# Patient Record
Sex: MALE | Race: WHITE | NOT HISPANIC OR LATINO | Employment: OTHER | URBAN - METROPOLITAN AREA
[De-identification: names, ages, dates, MRNs, and addresses within clinical notes are randomized per-mention and may not be internally consistent; named-entity substitution may affect disease eponyms.]

---

## 2019-01-09 ENCOUNTER — HOSPITAL ENCOUNTER (INPATIENT)
Facility: HOSPITAL | Age: 74
LOS: 2 days | Discharge: HOME | End: 2019-01-11
Attending: EMERGENCY MEDICINE | Admitting: HOSPITALIST

## 2019-01-09 ENCOUNTER — APPOINTMENT (OUTPATIENT)
Dept: CT IMAGING | Facility: HOSPITAL | Age: 74
End: 2019-01-09

## 2019-01-09 ENCOUNTER — APPOINTMENT (OUTPATIENT)
Dept: GENERAL RADIOLOGY | Facility: HOSPITAL | Age: 74
End: 2019-01-09

## 2019-01-09 DIAGNOSIS — K85.90 ACUTE PANCREATITIS WITHOUT INFECTION OR NECROSIS, UNSPECIFIED PANCREATITIS TYPE: Primary | ICD-10-CM

## 2019-01-09 DIAGNOSIS — N20.0 BILATERAL KIDNEY STONES: ICD-10-CM

## 2019-01-09 PROBLEM — I10 HYPERTENSION: Chronic | Status: ACTIVE | Noted: 2019-01-09

## 2019-01-09 PROBLEM — G47.30 SLEEP APNEA: Chronic | Status: ACTIVE | Noted: 2019-01-09

## 2019-01-09 PROBLEM — E87.1 HYPONATREMIA: Status: ACTIVE | Noted: 2019-01-09

## 2019-01-09 PROBLEM — E11.9 DIABETES MELLITUS TYPE 2, NONINSULIN DEPENDENT (HCC): Chronic | Status: ACTIVE | Noted: 2019-01-09

## 2019-01-09 LAB
ALBUMIN SERPL-MCNC: 4 G/DL (ref 3.5–5.2)
ALBUMIN/GLOB SERPL: 1.3 G/DL
ALP SERPL-CCNC: 83 U/L (ref 39–117)
ALT SERPL W P-5'-P-CCNC: 20 U/L (ref 1–41)
ANION GAP SERPL CALCULATED.3IONS-SCNC: 12.7 MMOL/L
AST SERPL-CCNC: 15 U/L (ref 1–40)
BASOPHILS # BLD AUTO: 0.01 10*3/MM3 (ref 0–0.2)
BASOPHILS NFR BLD AUTO: 0.1 % (ref 0–1.5)
BILIRUB SERPL-MCNC: 0.3 MG/DL (ref 0.1–1.2)
BUN BLD-MCNC: 17 MG/DL (ref 8–23)
BUN/CREAT SERPL: 17.3 (ref 7–25)
CALCIUM SPEC-SCNC: 9.4 MG/DL (ref 8.6–10.5)
CHLORIDE SERPL-SCNC: 95 MMOL/L (ref 98–107)
CO2 SERPL-SCNC: 25.3 MMOL/L (ref 22–29)
CREAT BLD-MCNC: 0.98 MG/DL (ref 0.76–1.27)
DEPRECATED RDW RBC AUTO: 44.9 FL (ref 37–54)
EOSINOPHIL # BLD AUTO: 0.11 10*3/MM3 (ref 0–0.7)
EOSINOPHIL NFR BLD AUTO: 1.1 % (ref 0.3–6.2)
ERYTHROCYTE [DISTWIDTH] IN BLOOD BY AUTOMATED COUNT: 13.3 % (ref 11.5–14.5)
ETHANOL BLD-MCNC: 10 MG/DL (ref 0–10)
ETHANOL UR QL: 0.01 %
GFR SERPL CREATININE-BSD FRML MDRD: 75 ML/MIN/1.73
GLOBULIN UR ELPH-MCNC: 3.2 GM/DL
GLUCOSE BLD-MCNC: 112 MG/DL (ref 65–99)
GLUCOSE BLDC GLUCOMTR-MCNC: 105 MG/DL (ref 70–130)
HCT VFR BLD AUTO: 43.4 % (ref 40.4–52.2)
HGB BLD-MCNC: 13.8 G/DL (ref 13.7–17.6)
IMM GRANULOCYTES # BLD AUTO: 0.03 10*3/MM3 (ref 0–0.03)
IMM GRANULOCYTES NFR BLD AUTO: 0.3 % (ref 0–0.5)
LIPASE SERPL-CCNC: 400 U/L (ref 13–60)
LYMPHOCYTES # BLD AUTO: 1.33 10*3/MM3 (ref 0.9–4.8)
LYMPHOCYTES NFR BLD AUTO: 12.8 % (ref 19.6–45.3)
MCH RBC QN AUTO: 29.7 PG (ref 27–32.7)
MCHC RBC AUTO-ENTMCNC: 31.8 G/DL (ref 32.6–36.4)
MCV RBC AUTO: 93.5 FL (ref 79.8–96.2)
MONOCYTES # BLD AUTO: 1.01 10*3/MM3 (ref 0.2–1.2)
MONOCYTES NFR BLD AUTO: 9.7 % (ref 5–12)
NEUTROPHILS # BLD AUTO: 7.9 10*3/MM3 (ref 1.9–8.1)
NEUTROPHILS NFR BLD AUTO: 76 % (ref 42.7–76)
PLATELET # BLD AUTO: 242 10*3/MM3 (ref 140–500)
PMV BLD AUTO: 10.4 FL (ref 6–12)
POTASSIUM BLD-SCNC: 4.3 MMOL/L (ref 3.5–5.2)
PROT SERPL-MCNC: 7.2 G/DL (ref 6–8.5)
RBC # BLD AUTO: 4.64 10*6/MM3 (ref 4.6–6)
SODIUM BLD-SCNC: 133 MMOL/L (ref 136–145)
TROPONIN T SERPL-MCNC: <0.01 NG/ML (ref 0–0.03)
WBC NRBC COR # BLD: 10.39 10*3/MM3 (ref 4.5–10.7)

## 2019-01-09 PROCEDURE — 80307 DRUG TEST PRSMV CHEM ANLYZR: CPT | Performed by: EMERGENCY MEDICINE

## 2019-01-09 PROCEDURE — 36415 COLL VENOUS BLD VENIPUNCTURE: CPT

## 2019-01-09 PROCEDURE — 85025 COMPLETE CBC W/AUTO DIFF WBC: CPT | Performed by: PHYSICIAN ASSISTANT

## 2019-01-09 PROCEDURE — 80053 COMPREHEN METABOLIC PANEL: CPT | Performed by: PHYSICIAN ASSISTANT

## 2019-01-09 PROCEDURE — 74177 CT ABD & PELVIS W/CONTRAST: CPT

## 2019-01-09 PROCEDURE — 82962 GLUCOSE BLOOD TEST: CPT

## 2019-01-09 PROCEDURE — 84484 ASSAY OF TROPONIN QUANT: CPT | Performed by: EMERGENCY MEDICINE

## 2019-01-09 PROCEDURE — 25010000002 ENOXAPARIN PER 10 MG: Performed by: INTERNAL MEDICINE

## 2019-01-09 PROCEDURE — 71046 X-RAY EXAM CHEST 2 VIEWS: CPT

## 2019-01-09 PROCEDURE — 99284 EMERGENCY DEPT VISIT MOD MDM: CPT

## 2019-01-09 PROCEDURE — 36415 COLL VENOUS BLD VENIPUNCTURE: CPT | Performed by: PHYSICIAN ASSISTANT

## 2019-01-09 PROCEDURE — 25010000002 HYDROMORPHONE PER 4 MG: Performed by: EMERGENCY MEDICINE

## 2019-01-09 PROCEDURE — 25010000002 HYDROMORPHONE PER 4 MG: Performed by: INTERNAL MEDICINE

## 2019-01-09 PROCEDURE — 93005 ELECTROCARDIOGRAM TRACING: CPT

## 2019-01-09 PROCEDURE — 83690 ASSAY OF LIPASE: CPT | Performed by: EMERGENCY MEDICINE

## 2019-01-09 PROCEDURE — 25810000003 SODIUM CHLORIDE 0.9 % WITH KCL 20 MEQ 20-0.9 MEQ/L-% SOLUTION: Performed by: INTERNAL MEDICINE

## 2019-01-09 PROCEDURE — 93010 ELECTROCARDIOGRAM REPORT: CPT | Performed by: INTERNAL MEDICINE

## 2019-01-09 PROCEDURE — 25010000002 ONDANSETRON PER 1 MG

## 2019-01-09 PROCEDURE — 25010000002 IOPAMIDOL 61 % SOLUTION: Performed by: EMERGENCY MEDICINE

## 2019-01-09 PROCEDURE — 93005 ELECTROCARDIOGRAM TRACING: CPT | Performed by: EMERGENCY MEDICINE

## 2019-01-09 RX ORDER — ACETAMINOPHEN, ASPIRIN AND CAFFEINE 250; 250; 65 MG/1; MG/1; MG/1
1 TABLET, FILM COATED ORAL EVERY 6 HOURS PRN
COMMUNITY
End: 2019-01-11 | Stop reason: HOSPADM

## 2019-01-09 RX ORDER — BISACODYL 10 MG
10 SUPPOSITORY, RECTAL RECTAL DAILY PRN
Status: DISCONTINUED | OUTPATIENT
Start: 2019-01-09 | End: 2019-01-11 | Stop reason: HOSPADM

## 2019-01-09 RX ORDER — DEXTROSE MONOHYDRATE 25 G/50ML
25 INJECTION, SOLUTION INTRAVENOUS
Status: DISCONTINUED | OUTPATIENT
Start: 2019-01-09 | End: 2019-01-11 | Stop reason: HOSPADM

## 2019-01-09 RX ORDER — HYDROMORPHONE HYDROCHLORIDE 1 MG/ML
0.5 INJECTION, SOLUTION INTRAMUSCULAR; INTRAVENOUS; SUBCUTANEOUS
Status: DISCONTINUED | OUTPATIENT
Start: 2019-01-09 | End: 2019-01-11 | Stop reason: HOSPADM

## 2019-01-09 RX ORDER — ECHINACEA PURPUREA EXTRACT 125 MG
2 TABLET ORAL AS NEEDED
Status: DISCONTINUED | OUTPATIENT
Start: 2019-01-09 | End: 2019-01-11 | Stop reason: HOSPADM

## 2019-01-09 RX ORDER — NALOXONE HCL 0.4 MG/ML
0.4 VIAL (ML) INJECTION
Status: DISCONTINUED | OUTPATIENT
Start: 2019-01-09 | End: 2019-01-11 | Stop reason: HOSPADM

## 2019-01-09 RX ORDER — ONDANSETRON 2 MG/ML
4 INJECTION INTRAMUSCULAR; INTRAVENOUS EVERY 6 HOURS PRN
Status: DISCONTINUED | OUTPATIENT
Start: 2019-01-09 | End: 2019-01-09

## 2019-01-09 RX ORDER — SODIUM CHLORIDE 0.9 % (FLUSH) 0.9 %
1-10 SYRINGE (ML) INJECTION AS NEEDED
Status: DISCONTINUED | OUTPATIENT
Start: 2019-01-09 | End: 2019-01-11 | Stop reason: HOSPADM

## 2019-01-09 RX ORDER — ACETAMINOPHEN 325 MG/1
650 TABLET ORAL EVERY 6 HOURS PRN
Status: DISCONTINUED | OUTPATIENT
Start: 2019-01-09 | End: 2019-01-11 | Stop reason: HOSPADM

## 2019-01-09 RX ORDER — CALCIUM CARBONATE 200(500)MG
1 TABLET,CHEWABLE ORAL 2 TIMES DAILY PRN
Status: DISCONTINUED | OUTPATIENT
Start: 2019-01-09 | End: 2019-01-11 | Stop reason: HOSPADM

## 2019-01-09 RX ORDER — ONDANSETRON 2 MG/ML
INJECTION INTRAMUSCULAR; INTRAVENOUS
Status: COMPLETED
Start: 2019-01-09 | End: 2019-01-09

## 2019-01-09 RX ORDER — FAMOTIDINE 10 MG/ML
20 INJECTION, SOLUTION INTRAVENOUS ONCE
Status: COMPLETED | OUTPATIENT
Start: 2019-01-09 | End: 2019-01-09

## 2019-01-09 RX ORDER — ONDANSETRON 2 MG/ML
4 INJECTION INTRAMUSCULAR; INTRAVENOUS ONCE
Status: COMPLETED | OUTPATIENT
Start: 2019-01-09 | End: 2019-01-09

## 2019-01-09 RX ORDER — NITROGLYCERIN 0.4 MG/1
0.4 TABLET SUBLINGUAL
Status: DISCONTINUED | OUTPATIENT
Start: 2019-01-09 | End: 2019-01-11 | Stop reason: HOSPADM

## 2019-01-09 RX ORDER — HYDROMORPHONE HYDROCHLORIDE 1 MG/ML
0.5 INJECTION, SOLUTION INTRAMUSCULAR; INTRAVENOUS; SUBCUTANEOUS ONCE
Status: COMPLETED | OUTPATIENT
Start: 2019-01-09 | End: 2019-01-09

## 2019-01-09 RX ORDER — ONDANSETRON 4 MG/1
4 TABLET, ORALLY DISINTEGRATING ORAL EVERY 6 HOURS PRN
Status: DISCONTINUED | OUTPATIENT
Start: 2019-01-09 | End: 2019-01-11 | Stop reason: HOSPADM

## 2019-01-09 RX ORDER — ONDANSETRON 2 MG/ML
4 INJECTION INTRAMUSCULAR; INTRAVENOUS EVERY 6 HOURS PRN
Status: DISCONTINUED | OUTPATIENT
Start: 2019-01-09 | End: 2019-01-11 | Stop reason: HOSPADM

## 2019-01-09 RX ORDER — CLONIDINE HYDROCHLORIDE 0.1 MG/1
0.1 TABLET ORAL EVERY 8 HOURS PRN
Status: DISCONTINUED | OUTPATIENT
Start: 2019-01-09 | End: 2019-01-11 | Stop reason: HOSPADM

## 2019-01-09 RX ORDER — SODIUM CHLORIDE AND POTASSIUM CHLORIDE 150; 900 MG/100ML; MG/100ML
125 INJECTION, SOLUTION INTRAVENOUS CONTINUOUS
Status: DISCONTINUED | OUTPATIENT
Start: 2019-01-09 | End: 2019-01-10

## 2019-01-09 RX ORDER — DOCUSATE SODIUM 100 MG/1
100 CAPSULE, LIQUID FILLED ORAL 2 TIMES DAILY PRN
Status: DISCONTINUED | OUTPATIENT
Start: 2019-01-09 | End: 2019-01-11 | Stop reason: HOSPADM

## 2019-01-09 RX ORDER — ONDANSETRON 4 MG/1
4 TABLET, FILM COATED ORAL EVERY 6 HOURS PRN
Status: DISCONTINUED | OUTPATIENT
Start: 2019-01-09 | End: 2019-01-11 | Stop reason: HOSPADM

## 2019-01-09 RX ORDER — IPRATROPIUM BROMIDE 21 UG/1
2 SPRAY, METERED NASAL EVERY 12 HOURS
Status: DISCONTINUED | OUTPATIENT
Start: 2019-01-09 | End: 2019-01-11 | Stop reason: HOSPADM

## 2019-01-09 RX ORDER — SODIUM CHLORIDE 0.9 % (FLUSH) 0.9 %
3 SYRINGE (ML) INJECTION EVERY 12 HOURS SCHEDULED
Status: DISCONTINUED | OUTPATIENT
Start: 2019-01-09 | End: 2019-01-11 | Stop reason: HOSPADM

## 2019-01-09 RX ORDER — NICOTINE POLACRILEX 4 MG
15 LOZENGE BUCCAL
Status: DISCONTINUED | OUTPATIENT
Start: 2019-01-09 | End: 2019-01-11 | Stop reason: HOSPADM

## 2019-01-09 RX ADMIN — IOPAMIDOL 85 ML: 612 INJECTION, SOLUTION INTRAVENOUS at 15:30

## 2019-01-09 RX ADMIN — ENOXAPARIN SODIUM 40 MG: 40 INJECTION SUBCUTANEOUS at 20:58

## 2019-01-09 RX ADMIN — HYDROMORPHONE HYDROCHLORIDE 0.5 MG: 1 INJECTION, SOLUTION INTRAMUSCULAR; INTRAVENOUS; SUBCUTANEOUS at 18:03

## 2019-01-09 RX ADMIN — SODIUM CHLORIDE AND POTASSIUM CHLORIDE 125 ML/HR: .9; .15 SOLUTION INTRAVENOUS at 20:59

## 2019-01-09 RX ADMIN — FAMOTIDINE 20 MG: 10 INJECTION, SOLUTION INTRAVENOUS at 16:04

## 2019-01-09 RX ADMIN — ONDANSETRON 4 MG: 2 INJECTION INTRAMUSCULAR; INTRAVENOUS at 14:59

## 2019-01-09 RX ADMIN — IPRATROPIUM BROMIDE 2 SPRAY: 21 SPRAY NASAL at 20:58

## 2019-01-09 RX ADMIN — METOPROLOL TARTRATE 25 MG: 25 TABLET ORAL at 20:58

## 2019-01-09 RX ADMIN — SODIUM CHLORIDE 1000 ML: 9 INJECTION, SOLUTION INTRAVENOUS at 16:03

## 2019-01-09 RX ADMIN — Medication 3 ML: at 21:00

## 2019-01-09 RX ADMIN — HYDROMORPHONE HYDROCHLORIDE 0.5 MG: 1 INJECTION, SOLUTION INTRAMUSCULAR; INTRAVENOUS; SUBCUTANEOUS at 20:58

## 2019-01-09 RX ADMIN — HYDROMORPHONE HYDROCHLORIDE 0.5 MG: 1 INJECTION, SOLUTION INTRAMUSCULAR; INTRAVENOUS; SUBCUTANEOUS at 16:02

## 2019-01-09 NOTE — ED PROVIDER NOTES
EMERGENCY DEPARTMENT ENCOUNTER    CHIEF COMPLAINT  Chief Complaint: abdominal pain  History given by: patient  History limited by: none  Room Number: 53/53  PMD: Feli Slade MD      HPI:  Pt is a 73 y.o. male who presents complaining of burning epigastric abdominal pain that started yesterday. Pt admits to nausea and abd distention but denies SOA, dizziness or excessive EtOH use. Pt states that he had a negative stress performed a year ago at [facility]. Pt denies having similar symptoms previously.    Duration: 2 days  Onset: gradual  Timing: constant  Location: epigastric abdomen  Radiation: none  Quality: burning  Intensity/Severity: moderate  Progression: unchanged  Associated Symptoms: nausea, abd distention  Aggravating Factors: none  Alleviating Factors: none  Previous Episodes: Pt denies having similar symptoms previously.  Treatment before arrival: none     PAST MEDICAL HISTORY  Active Ambulatory Problems     Diagnosis Date Noted   • Osteoarthritis of left hip 06/13/2016   • Osteoarthritis of right hip 07/20/2016     Resolved Ambulatory Problems     Diagnosis Date Noted   • No Resolved Ambulatory Problems     Past Medical History:   Diagnosis Date   • Acid reflux    • Arthritis    • Bilateral kidney stones    • Diabetes mellitus (CMS/[facility])    • Diabetes mellitus type 2, noninsulin dependent (CMS/[facility])    • High cholesterol    • Hypertension    • Sleep apnea        PAST SURGICAL HISTORY  Past Surgical History:   Procedure Laterality Date   • EXTRACORPOREAL SHOCK WAVE LITHOTRIPSY (ESWL)  2014   • FINGER/THUMB CLOSED REDUCTION AND PERCUTANEOUS PINNING Left 1982   • SINUS SURGERY     • TOTAL HIP ARTHROPLASTY Left 6/13/2016    Procedure: LT TOTAL HIP ARTHROPLASTY ANTERIOR;  Surgeon: Sandor Phillips MD;  Location: [facility];  Service:    • TOTAL HIP ARTHROPLASTY Right 7/20/2016    Procedure: RT TOTAL HIP ARTHROPLASTY ANTERIOR WITH HANA TABLE;  Surgeon: Sandor Phillips MD;  Location: [facility] OR;   Service:        FAMILY HISTORY  History reviewed. No pertinent family history.    SOCIAL HISTORY  Social History     Socioeconomic History   • Marital status:      Spouse name: Not on file   • Number of children: Not on file   • Years of education: Not on file   • Highest education level: Not on file   Social Needs   • Financial resource strain: Not on file   • Food insecurity - worry: Not on file   • Food insecurity - inability: Not on file   • Transportation needs - medical: Not on file   • Transportation needs - non-medical: Not on file   Occupational History   • Not on file   Tobacco Use   • Smoking status: Never Smoker   Substance and Sexual Activity   • Alcohol use: Yes     Comment: RARELY   • Drug use: No   • Sexual activity: Defer   Other Topics Concern   • Not on file   Social History Narrative   • Not on file       ALLERGIES  Naproxen    REVIEW OF SYSTEMS  Review of Systems   Constitutional: Negative for activity change, appetite change and fever.   HENT: Negative for congestion and sore throat.    Eyes: Negative.    Respiratory: Negative for cough and shortness of breath.    Cardiovascular: Negative for chest pain and leg swelling.   Gastrointestinal: Positive for abdominal distention, abdominal pain ( epigastric) and nausea. Negative for diarrhea and vomiting.   Endocrine: Negative.    Genitourinary: Negative for decreased urine volume and dysuria.   Musculoskeletal: Negative for neck pain.   Skin: Negative for rash and wound.   Allergic/Immunologic: Negative.    Neurological: Negative for dizziness, weakness, numbness and headaches.   Hematological: Negative.    Psychiatric/Behavioral: Negative.    All other systems reviewed and are negative.      PHYSICAL EXAM  ED Triage Vitals   Temp Heart Rate Resp BP SpO2   01/09/19 1227 01/09/19 1227 01/09/19 1227 01/09/19 1238 01/09/19 1227   97 °F (36.1 °C) 103 16 160/87 97 %      Temp src Heart Rate Source Patient Position BP Location FiO2 (%)   01/09/19  1227 -- -- -- --   Tympanic           Physical Exam   Constitutional: He is oriented to person, place, and time. No distress.   HENT:   Head: Normocephalic and atraumatic.   Eyes: EOM are normal. Pupils are equal, round, and reactive to light.   Neck: Normal range of motion. Neck supple.   Cardiovascular: Regular rhythm and normal heart sounds. Tachycardia present.   Pulmonary/Chest: Effort normal and breath sounds normal. No respiratory distress.   Abdominal: Soft. There is tenderness in the epigastric area. There is no rebound, no guarding and no tenderness at McBurney's point.   Musculoskeletal: Normal range of motion. He exhibits no edema.   Neurological: He is alert and oriented to person, place, and time. He has normal sensation and normal strength.   Skin: Skin is warm and dry.   Psychiatric: Mood and affect normal.   Nursing note and vitals reviewed.      LAB RESULTS  Lab Results (last 24 hours)     Procedure Component Value Units Date/Time    CBC & Differential [[id number]] Collected:  01/09/19 1238    Specimen:  Blood Updated:  01/09/19 1252    Narrative:       The following orders were created for panel order CBC & Differential.  Procedure                               Abnormality         Status                     ---------                               -----------         ------                     CBC Auto Differential[[id number]]         Abnormal            Final result                 Please view results for these tests on the individual orders.    Comprehensive Metabolic Panel [[id number]]  (Abnormal) Collected:  01/09/19 1238    Specimen:  Blood Updated:  01/09/19 1309     Glucose 112 mg/dL      BUN 17 mg/dL      Creatinine 0.98 mg/dL      Sodium 133 mmol/L      Potassium 4.3 mmol/L      Chloride 95 mmol/L      CO2 25.3 mmol/L      Calcium 9.4 mg/dL      Total Protein 7.2 g/dL      Albumin 4.00 g/dL      ALT (SGPT) 20 U/L      AST (SGOT) 15 U/L      Alkaline Phosphatase 83 U/L      Total Bilirubin 0.3  mg/dL      eGFR Non African Amer 75 mL/min/1.73      Globulin 3.2 gm/dL      A/G Ratio 1.3 g/dL      BUN/Creatinine Ratio 17.3     Anion Gap 12.7 mmol/L     Narrative:       The MDRD GFR formula is only valid for adults with stable renal function between ages 18 and 70.    CBC Auto Differential [[id number]]  (Abnormal) Collected:  01/09/19 1238    Specimen:  Blood Updated:  01/09/19 1252     WBC 10.39 10*3/mm3      RBC 4.64 10*6/mm3      Hemoglobin 13.8 g/dL      Hematocrit 43.4 %      MCV 93.5 fL      MCH 29.7 pg      MCHC 31.8 g/dL      RDW 13.3 %      RDW-SD 44.9 fl      MPV 10.4 fL      Platelets 242 10*3/mm3      Neutrophil % 76.0 %      Lymphocyte % 12.8 %      Monocyte % 9.7 %      Eosinophil % 1.1 %      Basophil % 0.1 %      Immature Grans % 0.3 %      Neutrophils, Absolute 7.90 10*3/mm3      Lymphocytes, Absolute 1.33 10*3/mm3      Monocytes, Absolute 1.01 10*3/mm3      Eosinophils, Absolute 0.11 10*3/mm3      Basophils, Absolute 0.01 10*3/mm3      Immature Grans, Absolute 0.03 10*3/mm3     Lipase [[id number]]  (Abnormal) Collected:  01/09/19 1238    Specimen:  Blood Updated:  01/09/19 1538     Lipase 400 U/L     Troponin [[id number]]  (Normal) Collected:  01/09/19 1238    Specimen:  Blood Updated:  01/09/19 1531     Troponin T <0.010 ng/mL     Narrative:       Troponin T Reference Ranges:  Less than 0.03 ng/mL:    Negative for AMI  0.03 to 0.09 ng/mL:      Indeterminant for AMI  Greater than 0.09 ng/mL: Positive for AMI    Ethanol [[id number]] Collected:  01/09/19 1621    Specimen:  Blood Updated:  01/09/19 1656     Ethanol 10 mg/dL      Ethanol % 0.010 %           I ordered the above labs and reviewed the results    RADIOLOGY  CT Abdomen Pelvis With Contrast   Preliminary Result   1. There is acute pancreatitis localized to the pancreatic tail. The   more dense and bulbous appearance of the pancreatic tail may be at least   in part related to the acute pancreatitis, but the appearance is   concerning for an  underlying mass resulting in pancreatitis. There is   likely partial occlusion of the adjacent aspect of the splenic vein.   2. There is a 0.7 cm stone within the left UPJ with mild distention of a   lower pole infundibulum. There is also a 7 mm nonobstructing stone   within the right kidney.          XR Chest 2 View   Final Result   No evidence for active disease in the chest.       This report was finalized on 1/9/2019 1:24 PM by Dr. Nick Sheikh M.D.             I ordered the above noted radiological studies. Interpreted by radiologist. Discussed with radiologist Dr. Garcia. Reviewed by me in PACS.        PROCEDURES  Procedures    EKG          EKG time: 1230  Rhythm/Rate: sinus tachycardia, 100  P waves and OK: nl  QRS, axis: nl   ST and T waves: nl      Interpreted Contemporaneously by me, independently viewed  unchanged compared to prior 5/31/16      PROGRESS AND CONSULTS      1502- Notified pt of his EKG results and the plan to order lab and imaging studies for further evaluation. Pt understands and agrees with the plan, all questions answered.    1505  Ordered troponin, lipase, ethanol and CT abd/pelvis for further evaluation. Pepcid ordered for abd pain.    1543 Dilaudid ordered for pain management.  Ordered IVF for hydration.    1557 Rechecked pt. Pt is resting comfortably. Notified pt of his lab and imaging results, including the pancreatitis seen on CT. Discussed the plan to admit the pt for pain control and further evaluation. Pt and family agree with the plan and all questions were addressed.    1559- Placed call to [facility] for admission.    1625 Received a call from Dr. Torres ([facility]) and discussed patient's case. Dr. Torres agrees with the plan to admit the pt to a telemetry bed.         MEDICAL DECISION MAKING  Results were reviewed/discussed with the patient and they were also made aware of online access. Pt also made aware that some labs, such as cultures, will not be resulted during ER visit and  follow up with PMD is necessary.     MDM  Number of Diagnoses or Management Options  Acute pancreatitis without infection or necrosis, unspecified pancreatitis type:   Bilateral kidney stones:      Amount and/or Complexity of Data Reviewed  Clinical lab tests: ordered and reviewed (Lipase - 400)  Tests in the radiology section of CPT®: ordered and reviewed (CT abd/pelvis shows pancreatitis at the tail with a bulbous tail (can't r/o mass) without free fluid. 7mm stone at the proximal left UPJ stone wtihout obstruction. 7mm stone at the lower pole of the right kidney)  Tests in the medicine section of CPT®: ordered and reviewed (EKG - see procedure note)  Discussion of test results with the performing providers: yes (Dr. Garcia (radiologist))  Decide to obtain previous medical records or to obtain history from someone other than the patient: yes  Review and summarize past medical records: yes (Reviewed pt's stress test on 2/12/18 showed no Ischemia. EF 54%)  Discuss the patient with other providers: yes (Dr. Torres ([facility]))  Independent visualization of images, tracings, or specimens: yes           DIAGNOSIS  Final diagnoses:   Acute pancreatitis without infection or necrosis, unspecified pancreatitis type   Bilateral kidney stones       DISPOSITION  ADMISSION     Discussed treatment plan and reason for admission with pt/family and admitting physician.  Pt/family voiced understanding of the plan for admission for further testing/treatment as needed.         Latest Documented Vital Signs:  As of 5:52 PM  BP- 174/94 HR- 98 Temp- 97 °F (36.1 °C) (Tympanic) O2 sat- 99%    --  Documentation assistance provided by true Jeffries and Any Guzman for Dr. Nicole.  Information recorded by the true was done at my direction and has been verified and validated by me.      Abdiel Jeffries  01/09/19 1631       Any Guzman  01/09/19 1752       Juanito Nicole MD  01/09/19 4453

## 2019-01-10 LAB
ANION GAP SERPL CALCULATED.3IONS-SCNC: 8.5 MMOL/L
BASOPHILS # BLD AUTO: 0.01 10*3/MM3 (ref 0–0.2)
BASOPHILS NFR BLD AUTO: 0.1 % (ref 0–1.5)
BUN BLD-MCNC: 12 MG/DL (ref 8–23)
BUN/CREAT SERPL: 14.5 (ref 7–25)
CALCIUM SPEC-SCNC: 9.1 MG/DL (ref 8.6–10.5)
CHLORIDE SERPL-SCNC: 96 MMOL/L (ref 98–107)
CO2 SERPL-SCNC: 30.5 MMOL/L (ref 22–29)
CREAT BLD-MCNC: 0.83 MG/DL (ref 0.76–1.27)
DEPRECATED RDW RBC AUTO: 45.7 FL (ref 37–54)
EOSINOPHIL # BLD AUTO: 0.06 10*3/MM3 (ref 0–0.7)
EOSINOPHIL NFR BLD AUTO: 0.7 % (ref 0.3–6.2)
ERYTHROCYTE [DISTWIDTH] IN BLOOD BY AUTOMATED COUNT: 13.3 % (ref 11.5–14.5)
GFR SERPL CREATININE-BSD FRML MDRD: 91 ML/MIN/1.73
GLUCOSE BLD-MCNC: 125 MG/DL (ref 65–99)
GLUCOSE BLDC GLUCOMTR-MCNC: 115 MG/DL (ref 70–130)
GLUCOSE BLDC GLUCOMTR-MCNC: 120 MG/DL (ref 70–130)
GLUCOSE BLDC GLUCOMTR-MCNC: 91 MG/DL (ref 70–130)
GLUCOSE BLDC GLUCOMTR-MCNC: 95 MG/DL (ref 70–130)
HCT VFR BLD AUTO: 40.8 % (ref 40.4–52.2)
HGB BLD-MCNC: 12.8 G/DL (ref 13.7–17.6)
IMM GRANULOCYTES # BLD AUTO: 0.04 10*3/MM3 (ref 0–0.03)
IMM GRANULOCYTES NFR BLD AUTO: 0.5 % (ref 0–0.5)
LYMPHOCYTES # BLD AUTO: 0.94 10*3/MM3 (ref 0.9–4.8)
LYMPHOCYTES NFR BLD AUTO: 10.7 % (ref 19.6–45.3)
MAGNESIUM SERPL-MCNC: 1.8 MG/DL (ref 1.6–2.4)
MCH RBC QN AUTO: 29.7 PG (ref 27–32.7)
MCHC RBC AUTO-ENTMCNC: 31.4 G/DL (ref 32.6–36.4)
MCV RBC AUTO: 94.7 FL (ref 79.8–96.2)
MONOCYTES # BLD AUTO: 0.86 10*3/MM3 (ref 0.2–1.2)
MONOCYTES NFR BLD AUTO: 9.8 % (ref 5–12)
NEUTROPHILS # BLD AUTO: 6.87 10*3/MM3 (ref 1.9–8.1)
NEUTROPHILS NFR BLD AUTO: 78.2 % (ref 42.7–76)
PHOSPHATE SERPL-MCNC: 2.5 MG/DL (ref 2.5–4.5)
PLATELET # BLD AUTO: 223 10*3/MM3 (ref 140–500)
PMV BLD AUTO: 10.4 FL (ref 6–12)
POTASSIUM BLD-SCNC: 4.2 MMOL/L (ref 3.5–5.2)
RBC # BLD AUTO: 4.31 10*6/MM3 (ref 4.6–6)
SODIUM BLD-SCNC: 135 MMOL/L (ref 136–145)
TROPONIN T SERPL-MCNC: <0.01 NG/ML (ref 0–0.03)
TSH SERPL DL<=0.05 MIU/L-ACNC: 2.22 MIU/ML (ref 0.27–4.2)
WBC NRBC COR # BLD: 8.78 10*3/MM3 (ref 4.5–10.7)

## 2019-01-10 PROCEDURE — 25010000002 ENOXAPARIN PER 10 MG: Performed by: INTERNAL MEDICINE

## 2019-01-10 PROCEDURE — 99222 1ST HOSP IP/OBS MODERATE 55: CPT | Performed by: SURGERY

## 2019-01-10 PROCEDURE — 25010000002 HYDROMORPHONE PER 4 MG: Performed by: INTERNAL MEDICINE

## 2019-01-10 PROCEDURE — 82962 GLUCOSE BLOOD TEST: CPT

## 2019-01-10 PROCEDURE — 83735 ASSAY OF MAGNESIUM: CPT | Performed by: INTERNAL MEDICINE

## 2019-01-10 PROCEDURE — 80048 BASIC METABOLIC PNL TOTAL CA: CPT | Performed by: INTERNAL MEDICINE

## 2019-01-10 PROCEDURE — 84100 ASSAY OF PHOSPHORUS: CPT | Performed by: INTERNAL MEDICINE

## 2019-01-10 PROCEDURE — 85025 COMPLETE CBC W/AUTO DIFF WBC: CPT | Performed by: INTERNAL MEDICINE

## 2019-01-10 PROCEDURE — 84484 ASSAY OF TROPONIN QUANT: CPT | Performed by: INTERNAL MEDICINE

## 2019-01-10 PROCEDURE — 25810000003 SODIUM CHLORIDE 0.9 % WITH KCL 20 MEQ 20-0.9 MEQ/L-% SOLUTION: Performed by: INTERNAL MEDICINE

## 2019-01-10 PROCEDURE — 84443 ASSAY THYROID STIM HORMONE: CPT | Performed by: INTERNAL MEDICINE

## 2019-01-10 RX ORDER — SODIUM CHLORIDE 9 MG/ML
100 INJECTION, SOLUTION INTRAVENOUS CONTINUOUS
Status: DISCONTINUED | OUTPATIENT
Start: 2019-01-10 | End: 2019-01-11 | Stop reason: HOSPADM

## 2019-01-10 RX ADMIN — DOCUSATE SODIUM 100 MG: 100 CAPSULE, LIQUID FILLED ORAL at 08:38

## 2019-01-10 RX ADMIN — ENOXAPARIN SODIUM 80 MG: 80 INJECTION SUBCUTANEOUS at 21:43

## 2019-01-10 RX ADMIN — IPRATROPIUM BROMIDE 2 SPRAY: 21 SPRAY NASAL at 08:39

## 2019-01-10 RX ADMIN — ENOXAPARIN SODIUM 80 MG: 80 INJECTION SUBCUTANEOUS at 08:38

## 2019-01-10 RX ADMIN — IPRATROPIUM BROMIDE 2 SPRAY: 21 SPRAY NASAL at 18:35

## 2019-01-10 RX ADMIN — SODIUM CHLORIDE AND POTASSIUM CHLORIDE 125 ML/HR: .9; .15 SOLUTION INTRAVENOUS at 12:48

## 2019-01-10 RX ADMIN — Medication 3 ML: at 22:29

## 2019-01-10 RX ADMIN — ACETAMINOPHEN 650 MG: 325 TABLET, FILM COATED ORAL at 22:28

## 2019-01-10 RX ADMIN — METOPROLOL TARTRATE 25 MG: 25 TABLET ORAL at 21:43

## 2019-01-10 RX ADMIN — SODIUM CHLORIDE 100 ML/HR: 9 INJECTION, SOLUTION INTRAVENOUS at 16:27

## 2019-01-10 RX ADMIN — HYDROMORPHONE HYDROCHLORIDE 0.5 MG: 1 INJECTION, SOLUTION INTRAMUSCULAR; INTRAVENOUS; SUBCUTANEOUS at 01:00

## 2019-01-10 RX ADMIN — METOPROLOL TARTRATE 25 MG: 25 TABLET ORAL at 08:38

## 2019-01-10 RX ADMIN — ACETAMINOPHEN 650 MG: 325 TABLET, FILM COATED ORAL at 16:18

## 2019-01-10 RX ADMIN — SODIUM CHLORIDE AND POTASSIUM CHLORIDE 125 ML/HR: .9; .15 SOLUTION INTRAVENOUS at 05:08

## 2019-01-10 RX ADMIN — VALSARTAN: 80 TABLET, FILM COATED ORAL at 08:38

## 2019-01-10 RX ADMIN — ACETAMINOPHEN 650 MG: 325 TABLET, FILM COATED ORAL at 07:26

## 2019-01-10 NOTE — PLAN OF CARE
Problem: Patient Care Overview  Goal: Plan of Care Review  Outcome: Ongoing (interventions implemented as appropriate)   01/10/19 7008   Coping/Psychosocial   Plan of Care Reviewed With patient   Plan of Care Review   Progress improving   OTHER   Outcome Summary VSS. Medicated x2 for HA. No c/o abd pain or discomfort. Dr. Gonzales saw pt this morning. Strain urine. Up ad chandrakant. Continue to monitor.

## 2019-01-10 NOTE — PROGRESS NOTES
Discharge Planning Assessment  [facility]     Patient Name: Juan J Johnson  MRN: [id number]  Today's Date: 1/10/2019    Admit Date: 1/9/2019    Discharge Needs Assessment     Row Name 01/10/19 1010       Living Environment    Lives With  spouse    Name(s) of Who Lives With Patient  Desitny Johnson     Current Living Arrangements  home/apartment/condo    Potentially Unsafe Housing Conditions  other (see comments) no concerns     Primary Care Provided by  self    Provides Primary Care For  pet(s)    Family Caregiver if Needed  spouse    Quality of Family Relationships  helpful;involved;supportive    Able to Return to Prior Arrangements  yes       Resource/Environmental Concerns    Resource/Environmental Concerns  none    Transportation Concerns  car, none       Transition Planning    Patient/Family Anticipates Transition to  home    Patient/Family Anticipated Services at Transition  none    Transportation Anticipated  family or friend will provide       Discharge Needs Assessment    Readmission Within the Last 30 Days  no previous admission in last 30 days    Concerns to be Addressed  no discharge needs identified;denies needs/concerns at this time    Equipment Currently Used at Home  none    Anticipated Changes Related to Illness  none    Equipment Needed After Discharge  none        Discharge Plan     Row Name 01/10/19 1016       Plan    Plan  Home with spouse     Patient/Family in Agreement with Plan  yes    Plan Comments  CCP met with patient and patient's spouse, Destiny ([phone]) at bedside. CCP role explained and discharge planning discussed. Face sheet verified and IMM noted. Patient's PCP is Dr. Slade. Patient lives with his spouse, with three steps to the entrance and steps leading to the basement. Patient does not use DME and is independent with his ADLS. Patient has used [facility] in the past but does not feel like home health is needed at this time. Patient denies any SNF history. Patient's preferred  pharmacy is [facility]; patient denies having trouble affording his medications. Patient has transportation home. CCP will follow for any needs to arise. Mariela HUGOW         Destination      No service coordination in this encounter.      Durable Medical Equipment      No service coordination in this encounter.      Dialysis/Infusion      No service coordination in this encounter.      Home Medical Care      No service coordination in this encounter.      Community Resources      No service coordination in this encounter.          Demographic Summary     Row Name 01/10/19 1007       General Information    Admission Type  inpatient    Arrived From  emergency department    Required Notices Provided  Important Message from Medicare    Referral Source  admission list    Reason for Consult  discharge planning    Preferred Language  English     Used During This Interaction  no        Functional Status     Row Name 01/10/19 1008       Functional Status    Usual Activity Tolerance  good    Current Activity Tolerance  good       Functional Status, IADL    Medications  independent    Meal Preparation  independent    Housekeeping  independent    Laundry  independent    Shopping  independent       Mental Status    General Appearance WDL  WDL       Mental Status Summary    Recent Changes in Mental Status/Cognitive Functioning  no changes        Psychosocial    No documentation.       Abuse/Neglect    No documentation.       Legal    No documentation.       Substance Abuse    No documentation.       Patient Forms    No documentation.           SPRING Lundberg

## 2019-01-10 NOTE — PROGRESS NOTES
[facility]     LOS: 1 day     Name: Juan J Johnson  Age: 73 y.o.  Sex: male  :  1945  MRN: [id number]         Primary Care Physician: Feli Slade MD    NPO Diet NPO Except: Ice chips, Sips With Meds    Subjective   pain is improved. hungry.    Review of Systems   Respiratory: Negative for shortness of breath.    Cardiovascular: Negative for chest pain.   Gastrointestinal: Negative for abdominal pain, nausea and vomiting.      Objective   Temp:  [97.3 °F (36.3 °C)-98.6 °F (37 °C)] 98.2 °F (36.8 °C)  Heart Rate:  [] 94  Resp:  [16-20] 16  BP: (142-188)/() 154/86  SpO2:  [94 %-99 %] 97 %  on  Flow (L/min):  [2] 2;   Device (Oxygen Therapy): room air  Body mass index is 29.76 kg/m².    Physical Exam   Constitutional: He is oriented to person, place, and time. No distress.   Cardiovascular: Normal rate and regular rhythm.   Pulmonary/Chest: Effort normal and breath sounds normal. No respiratory distress.   Abdominal: Soft. Bowel sounds are normal. There is no tenderness. There is no rebound and no guarding.   Musculoskeletal: He exhibits no edema.   Neurological: He is alert and oriented to person, place, and time.   Skin: Skin is warm and dry.   Psychiatric: He has a normal mood and affect. His behavior is normal.     Reviewed medications and new clinical results    enoxaparin 80 mg Subcutaneous Q12H   insulin lispro 0-7 Units Subcutaneous 4x Daily With Meals & Nightly   ipratropium 2 spray Each Nare Q12H   metoprolol tartrate 25 mg Oral BID   sodium chloride 3 mL Intravenous Q12H   valsartan-HCTZ 160-12.5 combo dose  Oral Daily     sodium chloride 0.9 % with KCl 20 mEq 125 mL/hr Last Rate: 125 mL/hr (01/10/19 1248)     Results from last 7 days   Lab Units  01/10/19   0514  19   1238   WBC 10*3/mm3  8.78  10.39   HEMOGLOBIN g/dL  12.8*  13.8   PLATELETS 10*3/mm3  223  242     Results from last 7 days   Lab Units  01/10/19   0514   01/09/19   1238   SODIUM mmol/L  135*  133*   POTASSIUM mmol/L  4.2  4.3   CHLORIDE mmol/L  96*  95*   CO2 mmol/L  30.5*  25.3   BUN mg/dL  12  17   CREATININE mg/dL  0.83  0.98   CALCIUM mg/dL  9.1  9.4   GLUCOSE mg/dL  125*  112*     Lab Results   Lab Value Date/Time    LIPASE 400 (H) 01/09/2019 1238     Lab Results   Component Value Date    ANIONGAP 8.5 01/10/2019     Glucose   Date/Time Value Ref Range Status   01/10/2019 1559 95 70 - 130 mg/dL Final   01/10/2019 1124 115 70 - 130 mg/dL Final   01/10/2019 0633 120 70 - 130 mg/dL Final   01/09/2019 1953 105 70 - 130 mg/dL Final     Estimated Creatinine Clearance: 85.6 mL/min (by C-G formula based on SCr of 0.83 mg/dL).    Assessment/Plan   Active Hospital Problems    Diagnosis Date Noted   • Acute pancreatitis without infection or necrosis [K85.90] 01/09/2019   • Sleep apnea- DOES NOT WEAR CPAP [G47.30] 01/09/2019   • Hypertension [I10] 01/09/2019   • Diabetes mellitus type 2, noninsulin dependent  [E11.9] 01/09/2019   • Nephrolithiasis [N20.0] 01/09/2019   • Hyponatremia [E87.1] 01/09/2019      Resolved Hospital Problems   No resolved problems to display.     · acute pancureatitis  · pain resolved  · trial diet clears  · continue analgesia, fluids  · surgical consultation for concern underlying pancreatic mass  · partial splenic vein obstruction on scan. possible thrombus from pancreatitis  · AC for now   · left UPJ stone  · ESWL in a few weeks  · strain all urine  · DM 2 controlled  · KENNEY  · HTN  · disposition  · TBD  · I discussed the patient's findings and my recommendations with patient, family and nursing staff.    Vincenzo Slade MD   01/10/19  4:08 PM

## 2019-01-10 NOTE — PLAN OF CARE
Problem: Patient Care Overview  Goal: Plan of Care Review  Outcome: Ongoing (interventions implemented as appropriate)   01/10/19 0652   Coping/Psychosocial   Plan of Care Reviewed With patient   Plan of Care Review   Progress no change   OTHER   Outcome Summary Has slept intervals. Med for pain with good relief. Denies nausea. Abd distended. VSS. No changes. No diistress noted       Problem: Pancreatitis, Acute/Chronic (Adult)  Goal: Signs and Symptoms of Listed Potential Problems Will be Absent, Minimized or Managed (Pancreatitis, Acute/Chronic)  Outcome: Ongoing (interventions implemented as appropriate)      Problem: Fall Risk (Adult)  Goal: Identify Related Risk Factors and Signs and Symptoms  Outcome: Ongoing (interventions implemented as appropriate)    Goal: Absence of Fall  Outcome: Ongoing (interventions implemented as appropriate)

## 2019-01-10 NOTE — H&P
"PCP: Feli Slade MD    Chief complaint   Chief Complaint   Patient presents with   • Chest Pain       HPI  Patient is a 73 y.o. male presents with history of diabetes, hypertension, nephrolithiasis who presents to the ER with upper abdominal/chest pain.  Patient states that he has \"hiatal hernia pain\" in his upper abdomen that was burning but it has never gone across the top of his abdomen like it does now.  He denies any new medications.  He has been working in the yard in the garage.  They tried a new air Sr or and he got \"sick\" after that last night.  The pain started last night, constant, with nausea and vomiting at least 3 episodes, \"sharp\", moderate, didn't try to eat.  Decreased appetite.  Rarely has any alcohol. Denies recent chills.    2 days ago he did have a flank pain and told his wife \"I might be getting a kidney stone\".  Patient has history of lithotripsy 2014      PAST MEDICAL HISTORY  Past Medical History:   Diagnosis Date   • Acid reflux    • Arthritis    • Bilateral kidney stones    • Diabetes mellitus (CMS/HCC)    • Diabetes mellitus type 2, noninsulin dependent (CMS/HCC)    • High cholesterol    • Hypertension    • Sleep apnea     DOES NOT WEAR CPAP       PAST SURGICAL HISTORY  Past Surgical History:   Procedure Laterality Date   • EXTRACORPOREAL SHOCK WAVE LITHOTRIPSY (ESWL)  2014   • FINGER/THUMB CLOSED REDUCTION AND PERCUTANEOUS PINNING Left 1982   • SINUS SURGERY     • TOTAL HIP ARTHROPLASTY Left 6/13/2016    Procedure: LT TOTAL HIP ARTHROPLASTY ANTERIOR;  Surgeon: Sandor Phillips MD;  Location: [facility];  Service:    • TOTAL HIP ARTHROPLASTY Right 7/20/2016    Procedure: RT TOTAL HIP ARTHROPLASTY ANTERIOR WITH HANA TABLE;  Surgeon: Sandor Phillips MD;  Location: [facility] OR;  Service:        FAMILY HISTORY  History reviewed. No pertinent family history.    SOCIAL HISTORY  Social History     Tobacco Use   • Smoking status: Never Smoker   Substance Use Topics   • Alcohol use: Yes    "  Comment: RARELY   • Drug use: No       MEDICATIONS:  Medications Prior to Admission   Medication Sig Dispense Refill Last Dose   • aspirin-acetaminophen-caffeine (EXCEDRIN MIGRAINE) 250-250-65 MG per tablet Take 1 tablet by mouth Every 6 (Six) Hours As Needed for Headache.      • atorvastatin (LIPITOR) 40 MG tablet Take 40 mg by mouth every evening.   1/9/2019 at Unknown time   • metFORMIN (GLUCOPHAGE) 1000 MG tablet Take 1,000 mg by mouth 2 (two) times a day with meals.   1/9/2019 at Unknown time   • metoprolol tartrate (LOPRESSOR) 25 MG tablet Take 25 mg by mouth 2 (Two) Times a Day.   1/9/2019 at Unknown time   • omeprazole (PriLOSEC) 40 MG capsule Take 40 mg by mouth every morning.   1/9/2019 at Unknown time   • valsartan-hydrochlorothiazide (DIOVAN-HCT) 160-12.5 MG per tablet Take 1 tablet by mouth every evening.   1/9/2019 at Unknown time   • methocarbamol (ROBAXIN) 500 MG tablet Take 500 mg by mouth 4 (four) times a day. 1-2 TABS AS NEEDED EVERY 4 HRS FOR PAIN   Past Month at Unknown time       Allergies:  Naproxen    Review of Systems:  Nausea vomiting, abdominal pain  Negative for following (except as per HPI):  Constitution: chills, fevers, fatigue   Eyes: change of vision, loss of vision and discharge  ENT: ear drainage, ear ringing and facial trauma  Respiratory: cough, pleuritic pain, shortness of air  Cardiovascular: chest pressure, pain, lower extremity edema, palpitations  Gastrointestinal: constipation, diarrhea,   Integument: rash and wound  Hematologic / Lymphatic: excessive bleeding and easy bruising  Musculoskeletal: joint pain, joint stiffness, joint swelling and muscle pain  Neurological: headaches, numbness, seizures and tremors  Behavioral / Psych: anxiety, depression and hallucinations         Vital Signs  Temp:  [97 °F (36.1 °C)-98.6 °F (37 °C)] 98 °F (36.7 °C)  Heart Rate:  [] 107  Resp:  [16-19] 16  BP: (160-188)/() 166/85  Flowsheet Rows      First Filed Value   Admission  "Height  170.2 cm (67\") Documented at 01/09/2019 1227   Admission Weight  86.2 kg (190 lb) Documented at 01/09/2019 1234         Body mass index is 29.76 kg/m².    Physical Exam:  General Appearance:    Alert, cooperative, in no acute distress   Head:    Normocephalic, without obvious abnormality, atraumatic   Eyes:         conjunctivae and sclerae normal, no icterus, PERRLA   ENT:    Ears grossly intact, oral mucosa moist,   Neck:   No adenopathy, supple, trachea midline,    Back:     Normal to inspection, range of motion normal   Lungs:     Clear to auscultation,respirations regular, even and                   unlabored    Heart:    Regular rhythm and normal rate,  no murmur, normal S1 and S2,   Abdomen:     Normal bowel sounds, no masses,  soft  non-distended, mild tender to palpation diffusely; no rebound    Extremities:   Moves all extremities well, no cyanosis, no edema,             Pulses:   Pulses palpable and equal bilaterally   Skin:   No bleeding, rash, bruising    Neurologic:    Psych:   Cranial nerves 2 - 12 grossly intact, sensation grossly intact,     Moves all extremities well, equal bilateral strength    Alert and Oriented x 3, Normal Affect   LABS:  Admission on 01/09/2019   Component Date Value Ref Range Status   • Glucose 01/09/2019 112* 65 - 99 mg/dL Final   • BUN 01/09/2019 17  8 - 23 mg/dL Final   • Creatinine 01/09/2019 0.98  0.76 - 1.27 mg/dL Final   • Sodium 01/09/2019 133* 136 - 145 mmol/L Final   • Potassium 01/09/2019 4.3  3.5 - 5.2 mmol/L Final   • Chloride 01/09/2019 95* 98 - 107 mmol/L Final   • CO2 01/09/2019 25.3  22.0 - 29.0 mmol/L Final   • Calcium 01/09/2019 9.4  8.6 - 10.5 mg/dL Final   • Total Protein 01/09/2019 7.2  6.0 - 8.5 g/dL Final   • Albumin 01/09/2019 4.00  3.50 - 5.20 g/dL Final   • ALT (SGPT) 01/09/2019 20  1 - 41 U/L Final   • AST (SGOT) 01/09/2019 15  1 - 40 U/L Final   • Alkaline Phosphatase 01/09/2019 83  39 - 117 U/L Final   • Total Bilirubin 01/09/2019 0.3  0.1 " - 1.2 mg/dL Final   • eGFR Non African Amer 01/09/2019 75  >60 mL/min/1.73 Final   • Globulin 01/09/2019 3.2  gm/dL Final   • A/G Ratio 01/09/2019 1.3  g/dL Final   • BUN/Creatinine Ratio 01/09/2019 17.3  7.0 - 25.0 Final   • Anion Gap 01/09/2019 12.7  mmol/L Final   • WBC 01/09/2019 10.39  4.50 - 10.70 10*3/mm3 Final   • RBC 01/09/2019 4.64  4.60 - 6.00 10*6/mm3 Final   • Hemoglobin 01/09/2019 13.8  13.7 - 17.6 g/dL Final   • Hematocrit 01/09/2019 43.4  40.4 - 52.2 % Final   • MCV 01/09/2019 93.5  79.8 - 96.2 fL Final   • MCH 01/09/2019 29.7  27.0 - 32.7 pg Final   • MCHC 01/09/2019 31.8* 32.6 - 36.4 g/dL Final   • RDW 01/09/2019 13.3  11.5 - 14.5 % Final   • RDW-SD 01/09/2019 44.9  37.0 - 54.0 fl Final   • MPV 01/09/2019 10.4  6.0 - 12.0 fL Final   • Platelets 01/09/2019 242  140 - 500 10*3/mm3 Final   • Neutrophil % 01/09/2019 76.0  42.7 - 76.0 % Final   • Lymphocyte % 01/09/2019 12.8* 19.6 - 45.3 % Final   • Monocyte % 01/09/2019 9.7  5.0 - 12.0 % Final   • Eosinophil % 01/09/2019 1.1  0.3 - 6.2 % Final   • Basophil % 01/09/2019 0.1  0.0 - 1.5 % Final   • Immature Grans % 01/09/2019 0.3  0.0 - 0.5 % Final   • Neutrophils, Absolute 01/09/2019 7.90  1.90 - 8.10 10*3/mm3 Final   • Lymphocytes, Absolute 01/09/2019 1.33  0.90 - 4.80 10*3/mm3 Final   • Monocytes, Absolute 01/09/2019 1.01  0.20 - 1.20 10*3/mm3 Final   • Eosinophils, Absolute 01/09/2019 0.11  0.00 - 0.70 10*3/mm3 Final   • Basophils, Absolute 01/09/2019 0.01  0.00 - 0.20 10*3/mm3 Final   • Immature Grans, Absolute 01/09/2019 0.03  0.00 - 0.03 10*3/mm3 Final   • Lipase 01/09/2019 400* 13 - 60 U/L Final   • Troponin T 01/09/2019 <0.010  0.000 - 0.030 ng/mL Final   • Ethanol 01/09/2019 10  0 - 10 mg/dL Final   • Ethanol % 01/09/2019 0.010  % Final   • Glucose 01/09/2019 105  70 - 130 mg/dL Final       DIAGNOSTICS:  Xr Chest 2 View    Result Date: 1/9/2019  No evidence for active disease in the chest.  This report was finalized on 1/9/2019 1:24 PM by   Nick Sheikh M.D.      Ct Abdomen Pelvis With Contrast    Result Date: 1/9/2019  1. There is acute pancreatitis localized to the pancreatic tail. The more dense and bulbous appearance of the pancreatic tail may be at least in part related to the acute pancreatitis, but the appearance is concerning for an underlying mass resulting in pancreatitis. There is likely partial occlusion of the adjacent aspect of the splenic vein. 2. There is a 0.7 cm stone within the left UPJ with mild distention of a lower pole infundibulum. There is also a 7 mm nonobstructing stone within the right kidney.             Results Review:   I reviewed the patient's new clinical results.  Discussed with ER physician  I personally viewed and interpreted the patient's EKG/Telemetry data- sinus tach  Old records reviewed see above    ASSESSMENT AND PLAN     + Acute pancreatitis without infection or necrosis  -It is unclear to the etiology of the patient's acute pancreatitis.  Patient does not use excessive alcohol.  No evidence of gallstones.  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  There can be increased risk of GI bleed due to varices and splenomegaly- thus a long-term risk and benefit discussion will need to take place.  -could consider discussing with interventional radiology if recanalization a possibility +/- further anticoagulation    +Nephrolithiasis at UP junction  - Patient has some flank pain recently, has a history of lithotripsy.  And was  told he had a kidney stone in each of the kidneys years ago.  -Consult his urologist Dr. Santiago.  -IV fluids    +  Sleep apnea- DOES NOT WEAR CPAP  -Patient states after his surgery a couple years ago he had some hypoxia issues.  He only really tried one night with the CPAP machine.  I encouraged patient to see if different masks would help and he said he would discuss with his doc     + Hypertension- likely elevated due to pain.  He is on metoprolol 25 twice a day, valsartan 160 and HCTZ 12.5  -We'll treat the pain first.  -When necessary clonidine    +  Diabetes mellitus type 2, noninsulin dependent   -We'll hold metformin for now due to pancreatitis and started on low-dose sliding scale insulin     + Hyponatremia with hypochloremia likely due to dehydration  -IV fluid resuscitation    +DVT proph: Lovenox twice a day   +Medical decision maker: wife    I discussed the patients findings and my recommendations with the patient and/or family.  Please reference all orders placed.    Emily Torres MD  01/09/19  8:08 PM

## 2019-01-10 NOTE — CONSULTS
FIRST UROLOGY CONSULT      Patient Identification:  NAME:  Juan J Johnson  Age:  73 y.o.   Sex:  male   :  1945   MRN:  [id number]       Chief complaint: Urolithiasis    History of present illness:  This is a 73-year-old man with a history of urolithiasis who presented to the hospital with upper abdominal and chest pain. Bloodwork and imaging was consistent with pancreatitis. Incidentally found on CT (which was reviewed independently by me) was a 7 mm left renal pelvis stone with mild hydronephrosis as well as a nonobstructing right renal calculus. The patient denies flank pain, gross hematuria, dysuria.       Past medical history:  Past Medical History:   Diagnosis Date   • Acid reflux    • Arthritis    • Bilateral kidney stones    • Diabetes mellitus (CMS/HCC)    • Diabetes mellitus type 2, noninsulin dependent (CMS/HCC)    • High cholesterol    • Hypertension    • Sleep apnea     DOES NOT WEAR CPAP       Past surgical history:  Past Surgical History:   Procedure Laterality Date   • EXTRACORPOREAL SHOCK WAVE LITHOTRIPSY (ESWL)     • FINGER/THUMB CLOSED REDUCTION AND PERCUTANEOUS PINNING Left    • SINUS SURGERY     • TOTAL HIP ARTHROPLASTY Left 2016    Procedure: LT TOTAL HIP ARTHROPLASTY ANTERIOR;  Surgeon: Sandor Phillips MD;  Location: [facility];  Service:    • TOTAL HIP ARTHROPLASTY Right 2016    Procedure: RT TOTAL HIP ARTHROPLASTY ANTERIOR WITH HANA TABLE;  Surgeon: Sandor Phillips MD;  Location: [facility];  Service:        Allergies:  Naproxen    Home medications:  Medications Prior to Admission   Medication Sig Dispense Refill Last Dose   • aspirin-acetaminophen-caffeine (EXCEDRIN MIGRAINE) 250-250-65 MG per tablet Take 1 tablet by mouth Every 6 (Six) Hours As Needed for Headache.      • atorvastatin (LIPITOR) 40 MG tablet Take 40 mg by mouth every evening.   2019 at Unknown time   • metFORMIN (GLUCOPHAGE) 1000 MG tablet Take 1,000 mg by mouth 2 (two) times a day  with meals.   2019 at Unknown time   • metoprolol tartrate (LOPRESSOR) 25 MG tablet Take 25 mg by mouth 2 (Two) Times a Day.   2019 at Unknown time   • omeprazole (PriLOSEC) 40 MG capsule Take 40 mg by mouth every morning.   2019 at Unknown time   • valsartan-hydrochlorothiazide (DIOVAN-HCT) 160-12.5 MG per tablet Take 1 tablet by mouth every evening.   2019 at Unknown time   • methocarbamol (ROBAXIN) 500 MG tablet Take 500 mg by mouth 4 (four) times a day. 1-2 TABS AS NEEDED EVERY 4 HRS FOR PAIN   Past Month at Unknown time        Hospital medications:    enoxaparin 40 mg Subcutaneous Once   enoxaparin 80 mg Subcutaneous Q12H   insulin lispro 0-7 Units Subcutaneous 4x Daily With Meals & Nightly   ipratropium 2 spray Each Nare Q12H   metoprolol tartrate 25 mg Oral BID   sodium chloride 3 mL Intravenous Q12H   valsartan-HCTZ 160-12.5 combo dose  Oral Daily       sodium chloride 0.9 % with KCl 20 mEq 125 mL/hr Last Rate: 125 mL/hr (01/10/19 0504)     •  acetaminophen  •  bisacodyl  •  calcium carbonate  •  CloNIDine  •  dextrose  •  dextrose  •  docusate sodium  •  glucagon (human recombinant)  •  HYDROmorphone  •  naloxone  •  nitroglycerin  •  ondansetron **OR** ondansetron ODT **OR** ondansetron  •  sodium chloride  •  sodium chloride    Family history:  History reviewed. No pertinent family history.    Social history:  Social History     Tobacco Use   • Smoking status: Never Smoker   Substance Use Topics   • Alcohol use: Yes     Comment: RARELY   • Drug use: No       Review of systems:    Negative 12-system ROS except for the following:  Abdominal norm, nausea, vomiting      Objective:  TMax 24 hours:   Temp (24hrs), Av.7 °F (36.5 °C), Min:97 °F (36.1 °C), Max:98.6 °F (37 °C)      Vitals Ranges:   Temp:  [97 °F (36.1 °C)-98.6 °F (37 °C)] 97.4 °F (36.3 °C)  Heart Rate:  [] 100  Resp:  [16-20] 16  BP: (142-188)/() 162/101    Intake/Output Last 3 shifts:  I/O last 3 completed  shifts:  In: 3090 [P.O.:90; I.V.:1000; IV Piggyback:2000]  Out: 575 [Urine:575]     Physical Exam:       General Appearance:    Alert, cooperative, in no acute distress   Head:    Normocephalic, without obvious abnormality, atraumatic   Eyes:          PERRL, conjunctivae and corneas clear   Ears:    Normal external inspection       Neck:   Supple, no LAD, trachea midline   Back:     No CVA tenderness   Lungs:     Respirations unlabored, symmetric excursion    Heart:    RRR, intact peripheral pulses   Abdomen:     Soft, NDNT, no masses, no guarding   :    Deferred   Extremities:   No edema, no deformity   Skin:   No bleeding, bruising or rashes   Neuro/Psych:   Orientation intact, mood/affect pleasant, no focal findings       Results review:   I reviewed the patient's new clinical results.    Data review:  Lab Results (last 24 hours)     Procedure Component Value Units Date/Time    POC Glucose Once [[id number]]  (Normal) Collected:  01/10/19 1124    Specimen:  Blood Updated:  01/10/19 1129     Glucose 115 mg/dL     POC Glucose Once [[id number]]  (Normal) Collected:  01/10/19 0633    Specimen:  Blood Updated:  01/10/19 0635     Glucose 120 mg/dL     TSH [[id number]]  (Normal) Collected:  01/10/19 0514    Specimen:  Blood Updated:  01/10/19 0629     TSH 2.220 mIU/mL     Troponin [[id number]]  (Normal) Collected:  01/10/19 0514    Specimen:  Blood Updated:  01/10/19 0629     Troponin T <0.010 ng/mL     Narrative:       Troponin T Reference Ranges:  Less than 0.03 ng/mL:    Negative for AMI  0.03 to 0.09 ng/mL:      Indeterminant for AMI  Greater than 0.09 ng/mL: Positive for AMI    Basic Metabolic Panel [[id number]]  (Abnormal) Collected:  01/10/19 0514    Specimen:  Blood Updated:  01/10/19 0627     Glucose 125 mg/dL      BUN 12 mg/dL      Creatinine 0.83 mg/dL      Sodium 135 mmol/L      Potassium 4.2 mmol/L      Chloride 96 mmol/L      CO2 30.5 mmol/L      Calcium 9.1 mg/dL      eGFR Non African Amer 91 mL/min/1.73       BUN/Creatinine Ratio 14.5     Anion Gap 8.5 mmol/L     Narrative:       The MDRD GFR formula is only valid for adults with stable renal function between ages 18 and 70.    Phosphorus [[id number]]  (Normal) Collected:  01/10/19 0514    Specimen:  Blood Updated:  01/10/19 0616     Phosphorus 2.5 mg/dL     Magnesium [[id number]]  (Normal) Collected:  01/10/19 0514    Specimen:  Blood Updated:  01/10/19 0616     Magnesium 1.8 mg/dL     CBC & Differential [[id number]] Collected:  01/10/19 0514    Specimen:  Blood Updated:  01/10/19 0603    Narrative:       The following orders were created for panel order CBC & Differential.  Procedure                               Abnormality         Status                     ---------                               -----------         ------                     CBC Auto Differential[[id number]]        Abnormal            Final result                 Please view results for these tests on the individual orders.    CBC Auto Differential [[id number]]  (Abnormal) Collected:  01/10/19 0514    Specimen:  Blood Updated:  01/10/19 0603     WBC 8.78 10*3/mm3      RBC 4.31 10*6/mm3      Hemoglobin 12.8 g/dL      Hematocrit 40.8 %      MCV 94.7 fL      MCH 29.7 pg      MCHC 31.4 g/dL      RDW 13.3 %      RDW-SD 45.7 fl      MPV 10.4 fL      Platelets 223 10*3/mm3      Neutrophil % 78.2 %      Lymphocyte % 10.7 %      Monocyte % 9.8 %      Eosinophil % 0.7 %      Basophil % 0.1 %      Immature Grans % 0.5 %      Neutrophils, Absolute 6.87 10*3/mm3      Lymphocytes, Absolute 0.94 10*3/mm3      Monocytes, Absolute 0.86 10*3/mm3      Eosinophils, Absolute 0.06 10*3/mm3      Basophils, Absolute 0.01 10*3/mm3      Immature Grans, Absolute 0.04 10*3/mm3     POC Glucose Once [[id number]]  (Normal) Collected:  01/09/19 1953    Specimen:  Blood Updated:  01/09/19 1954     Glucose 105 mg/dL     Ethanol [[id number]] Collected:  01/09/19 1621    Specimen:  Blood Updated:  01/09/19 1656     Ethanol 10 mg/dL       Ethanol % 0.010 %     Lipase [[id number]]  (Abnormal) Collected:  01/09/19 1238    Specimen:  Blood Updated:  01/09/19 1538     Lipase 400 U/L     Troponin [[id number]]  (Normal) Collected:  01/09/19 1238    Specimen:  Blood Updated:  01/09/19 1531     Troponin T <0.010 ng/mL     Narrative:       Troponin T Reference Ranges:  Less than 0.03 ng/mL:    Negative for AMI  0.03 to 0.09 ng/mL:      Indeterminant for AMI  Greater than 0.09 ng/mL: Positive for AMI    Comprehensive Metabolic Panel [[id number]]  (Abnormal) Collected:  01/09/19 1238    Specimen:  Blood Updated:  01/09/19 1309     Glucose 112 mg/dL      BUN 17 mg/dL      Creatinine 0.98 mg/dL      Sodium 133 mmol/L      Potassium 4.3 mmol/L      Chloride 95 mmol/L      CO2 25.3 mmol/L      Calcium 9.4 mg/dL      Total Protein 7.2 g/dL      Albumin 4.00 g/dL      ALT (SGPT) 20 U/L      AST (SGOT) 15 U/L      Alkaline Phosphatase 83 U/L      Total Bilirubin 0.3 mg/dL      eGFR Non African Amer 75 mL/min/1.73      Globulin 3.2 gm/dL      A/G Ratio 1.3 g/dL      BUN/Creatinine Ratio 17.3     Anion Gap 12.7 mmol/L     Narrative:       The MDRD GFR formula is only valid for adults with stable renal function between ages 18 and 70.    CBC & Differential [[id number]] Collected:  01/09/19 1238    Specimen:  Blood Updated:  01/09/19 1252    Narrative:       The following orders were created for panel order CBC & Differential.  Procedure                               Abnormality         Status                     ---------                               -----------         ------                     CBC Auto Differential[[id number]]         Abnormal            Final result                 Please view results for these tests on the individual orders.    CBC Auto Differential [[id number]]  (Abnormal) Collected:  01/09/19 1238    Specimen:  Blood Updated:  01/09/19 1252     WBC 10.39 10*3/mm3      RBC 4.64 10*6/mm3      Hemoglobin 13.8 g/dL      Hematocrit 43.4 %      MCV 93.5 fL       MCH 29.7 pg      MCHC 31.8 g/dL      RDW 13.3 %      RDW-SD 44.9 fl      MPV 10.4 fL      Platelets 242 10*3/mm3      Neutrophil % 76.0 %      Lymphocyte % 12.8 %      Monocyte % 9.7 %      Eosinophil % 1.1 %      Basophil % 0.1 %      Immature Grans % 0.3 %      Neutrophils, Absolute 7.90 10*3/mm3      Lymphocytes, Absolute 1.33 10*3/mm3      Monocytes, Absolute 1.01 10*3/mm3      Eosinophils, Absolute 0.11 10*3/mm3      Basophils, Absolute 0.01 10*3/mm3      Immature Grans, Absolute 0.03 10*3/mm3            Imaging:  Imaging Results (last 24 hours)     Procedure Component Value Units Date/Time    CT Abdomen Pelvis With Contrast [[id number]] Collected:  01/09/19 1610     Updated:  01/10/19 0836    Narrative:       CT ABDOMEN AND PELVIS WITH IV CONTRAST     HISTORY: 73-year-old male with upper abdominal pain and burning in  chest.     TECHNIQUE: Radiation dose reduction techniques were utilized, including  automated exposure control and exposure modulation based on body size.   3 mm images were obtained through the abdomen and pelvis after the  administration of IV contrast. There is no previous CT for comparison.     FINDINGS: There is acute pancreatitis localized to the pancreatic tail.  The pancreatic tail appears more dense and bulbous to the rest of the  pancreatic parenchyma, image 43. There is a trace amount of fluid along  the left Gerota's fascia, but there is no fluid collection. Pancreas  appears otherwise unremarkable. There is some enhancement of the splenic  vein adjacently, but partial occlusion is suspected, image 37. The  splenic vein is otherwise patent as is the SMV and portal vein. There is  mild fatty infiltration of the liver. The gallbladder appears  unremarkable, other than a tiny gallstone. There is a 7 mm stone within  the left UPJ with mild dilatation of a lower pole infundibulum. There is  a similar sized stone nonobstructing at the lower pole of the right  kidney. Adrenals appear  unremarkable. There is a small hiatal hernia. No  acute bowel abnormality is seen. There is mild sigmoid diverticulosis  without evidence for diverticulitis. The appendix appears normal. There  is significant streak artifact through the lower pelvis secondary to the  bilateral hip arthroplasty hardware. In the visualized lower lung  fields, there are no acute appearing airspace opacities and there are no  effusions.       Impression:       1. There is acute pancreatitis localized to the pancreatic tail. The  more dense and bulbous appearance of the pancreatic tail may be at least  in part related to the acute pancreatitis, but the appearance is  concerning for an underlying mass resulting in pancreatitis. There is  likely partial occlusion of the adjacent aspect of the splenic vein. The  pancreatic tail is unlikely to be accessible for additional  characterization and FNA with endoscopic ultrasound. A surgical  consultation is recommended.  2. There is a 0.7 cm stone within the left UPJ with mild distention of a  lower pole infundibulum. There is also a 7 mm nonobstructing stone  within the right kidney.     Discussed with Dr. Nicole     This report was finalized on 1/10/2019 8:33 AM by Dr. Bernice Garcia M.D.       XR Chest 2 View [[id number]] Collected:  01/09/19 1323     Updated:  01/09/19 1327    Narrative:       PA AND LATERAL CHEST     HISTORY: 73-year-old male with chest pain since yesterday. History of  hypertension.     COMPARISON: PA and lateral chest 05/31/2016.     FINDINGS: Scattered calcified granulomas are present. Cardiomediastinal  silhouette is within normal limits. There is elevation and eventration  of the right hemidiaphragm. There is no evidence for pulmonary edema or  pleural effusion or significant change compared to previous examination.       Impression:       No evidence for active disease in the chest.     This report was finalized on 1/9/2019 1:24 PM by Dr. Nick Sheikh M.D.                 Assessment:       Acute pancreatitis without infection or necrosis    Sleep apnea- DOES NOT WEAR CPAP    Hypertension    Diabetes mellitus type 2, noninsulin dependent     Nephrolithiasis    Hyponatremia    Urolithiasis    Plan:     - Discussed treatment options for left UPJ stone including observation, ureteroscopy, and ESWL. After weighing the pros and cons of each option, he would like to pursue ESWL. We will plan to schedule in a few weeks once the pancreatitis has resolved. My  will call to arrange the procedure.  - Strain all urine    Leoncio Gonzales Jr., MD  01/10/19  11:51 AM

## 2019-01-11 VITALS
WEIGHT: 190 LBS | RESPIRATION RATE: 18 BRPM | HEART RATE: 109 BPM | HEIGHT: 67 IN | TEMPERATURE: 98.1 F | DIASTOLIC BLOOD PRESSURE: 94 MMHG | OXYGEN SATURATION: 97 % | BODY MASS INDEX: 29.82 KG/M2 | SYSTOLIC BLOOD PRESSURE: 148 MMHG

## 2019-01-11 LAB
ANION GAP SERPL CALCULATED.3IONS-SCNC: 12.3 MMOL/L
BUN BLD-MCNC: 9 MG/DL (ref 8–23)
BUN/CREAT SERPL: 13.8 (ref 7–25)
CALCIUM SPEC-SCNC: 9.2 MG/DL (ref 8.6–10.5)
CHLORIDE SERPL-SCNC: 102 MMOL/L (ref 98–107)
CO2 SERPL-SCNC: 23.7 MMOL/L (ref 22–29)
CREAT BLD-MCNC: 0.65 MG/DL (ref 0.76–1.27)
DEPRECATED RDW RBC AUTO: 45.5 FL (ref 37–54)
EOSINOPHIL # BLD MANUAL: 0.15 10*3/MM3 (ref 0–0.7)
EOSINOPHIL NFR BLD MANUAL: 2 % (ref 0.3–6.2)
ERYTHROCYTE [DISTWIDTH] IN BLOOD BY AUTOMATED COUNT: 13.6 % (ref 11.5–14.5)
GFR SERPL CREATININE-BSD FRML MDRD: 121 ML/MIN/1.73
GLUCOSE BLD-MCNC: 105 MG/DL (ref 65–99)
GLUCOSE BLDC GLUCOMTR-MCNC: 90 MG/DL (ref 70–130)
GLUCOSE BLDC GLUCOMTR-MCNC: 99 MG/DL (ref 70–130)
HCT VFR BLD AUTO: 37.1 % (ref 40.4–52.2)
HGB BLD-MCNC: 12 G/DL (ref 13.7–17.6)
LIPASE SERPL-CCNC: 30 U/L (ref 13–60)
LYMPHOCYTES # BLD MANUAL: 0.67 10*3/MM3 (ref 0.9–4.8)
LYMPHOCYTES NFR BLD MANUAL: 5 % (ref 5–12)
LYMPHOCYTES NFR BLD MANUAL: 9 % (ref 19.6–45.3)
MCH RBC QN AUTO: 29.9 PG (ref 27–32.7)
MCHC RBC AUTO-ENTMCNC: 32.3 G/DL (ref 32.6–36.4)
MCV RBC AUTO: 92.3 FL (ref 79.8–96.2)
MONOCYTES # BLD AUTO: 0.37 10*3/MM3 (ref 0.2–1.2)
NEUTROPHILS # BLD AUTO: 6.22 10*3/MM3 (ref 1.9–8.1)
NEUTROPHILS NFR BLD MANUAL: 84 % (ref 42.7–76)
PLAT MORPH BLD: NORMAL
PLATELET # BLD AUTO: 204 10*3/MM3 (ref 140–500)
PMV BLD AUTO: 10.8 FL (ref 6–12)
POTASSIUM BLD-SCNC: 4.2 MMOL/L (ref 3.5–5.2)
RBC # BLD AUTO: 4.02 10*6/MM3 (ref 4.6–6)
RBC MORPH BLD: NORMAL
SCAN SLIDE: NORMAL
SODIUM BLD-SCNC: 138 MMOL/L (ref 136–145)
WBC MORPH BLD: NORMAL
WBC NRBC COR # BLD: 7.41 10*3/MM3 (ref 4.5–10.7)

## 2019-01-11 PROCEDURE — 80048 BASIC METABOLIC PNL TOTAL CA: CPT | Performed by: INTERNAL MEDICINE

## 2019-01-11 PROCEDURE — 82962 GLUCOSE BLOOD TEST: CPT

## 2019-01-11 PROCEDURE — 25010000002 ENOXAPARIN PER 10 MG: Performed by: INTERNAL MEDICINE

## 2019-01-11 PROCEDURE — 36415 COLL VENOUS BLD VENIPUNCTURE: CPT | Performed by: INTERNAL MEDICINE

## 2019-01-11 PROCEDURE — 85007 BL SMEAR W/DIFF WBC COUNT: CPT | Performed by: INTERNAL MEDICINE

## 2019-01-11 PROCEDURE — 83690 ASSAY OF LIPASE: CPT | Performed by: HOSPITALIST

## 2019-01-11 PROCEDURE — 85025 COMPLETE CBC W/AUTO DIFF WBC: CPT | Performed by: INTERNAL MEDICINE

## 2019-01-11 RX ADMIN — ACETAMINOPHEN 650 MG: 325 TABLET, FILM COATED ORAL at 08:29

## 2019-01-11 RX ADMIN — ENOXAPARIN SODIUM 80 MG: 80 INJECTION SUBCUTANEOUS at 08:29

## 2019-01-11 RX ADMIN — METOPROLOL TARTRATE 25 MG: 25 TABLET ORAL at 08:29

## 2019-01-11 RX ADMIN — SODIUM CHLORIDE 100 ML/HR: 9 INJECTION, SOLUTION INTRAVENOUS at 01:35

## 2019-01-11 RX ADMIN — IPRATROPIUM BROMIDE 2 SPRAY: 21 SPRAY NASAL at 08:30

## 2019-01-11 RX ADMIN — VALSARTAN: 80 TABLET, FILM COATED ORAL at 08:29

## 2019-01-11 NOTE — DISCHARGE SUMMARY
[facility]    Date of Discharge:  1/11/2019    PCP: Feli Slade MD    Discharge Diagnosis:   Active Hospital Problems    Diagnosis Date Noted   • Acute pancreatitis without infection or necrosis [K85.90] 01/09/2019   • Sleep apnea- DOES NOT WEAR CPAP [G47.30] 01/09/2019   • Hypertension [I10] 01/09/2019   • Diabetes mellitus type 2, noninsulin dependent  [E11.9] 01/09/2019   • Nephrolithiasis [N20.0] 01/09/2019   • Hyponatremia [E87.1] 01/09/2019      Resolved Hospital Problems   No resolved problems to display.      Consults     Date and Time Order Name Status Description    1/10/2019 1615 Inpatient General Surgery Consult Completed     1/9/2019 1841 Inpatient Urology Consult Completed     1/9/2019 6489 [facility] (on-call MD unless specified) Completed         Hospital Course  Please see history and physical for details. Patient is a 73 y.o. male admitted with pancreatitis. He was treated with gut rest, fluids, analgesia. He improved and abdominal pain resolved and he is tolerating PO. Radiologist suspected partial occlusion of the splenic vein. This could be representing splenic vein thrombus due to inflammation from pancreatitis. I discussed this with the patient and for now patient agreed to anticoagulation. Patient denies any history of bleeding disorder/history of GI bleed. There was also a possible pancreatic mass. Surgery was asked to see the patient. Surgery suggested allowing a few weeks for this episode to resolve and repeat CT imaging with a dedicated pancreatic protocol. At that time splenic vein can be reevaluated and consideration made to stop anticoagulation. Surgery did not recommend obtaining tumor markers at this time. Lipase today is normal.    Patient also had a 0.7 cm left UPJ stone. Urology saw the patient and after discussing with him the options patient would like to pursue ESWL. They are going to schedule that in a few weeks once the  pancreatitis has resolved. Of course they will need to stop anticoagulation before procedure.    I discussed the patient's findings and my recommendations with patient, family and nursing staff.    Condition on Discharge: Improved.     Temp:  [98.1 °F (36.7 °C)-98.9 °F (37.2 °C)] 98.1 °F (36.7 °C)  Heart Rate:  [] 109  Resp:  [18] 18  BP: (144-169)/(85-96) 148/94  Body mass index is 29.76 kg/m².    Physical Exam   Constitutional: He is oriented to person, place, and time. No distress.   Cardiovascular: Normal rate and regular rhythm.   Pulmonary/Chest: Effort normal and breath sounds normal. No respiratory distress.   Abdominal: Soft. Bowel sounds are normal. There is no tenderness. There is no rebound and no guarding.   Musculoskeletal: He exhibits no edema.   Neurological: He is alert and oriented to person, place, and time.   Skin: Skin is warm and dry.   Psychiatric: He has a normal mood and affect. His behavior is normal.        Discharge Medications      New Medications      Instructions Start Date   ELIQUIS STARTER PACK tablet   5 mg, Oral, Take As Directed         Continue These Medications      Instructions Start Date   atorvastatin 40 MG tablet  Commonly known as:  LIPITOR   40 mg, Oral, Every Evening      metFORMIN 1000 MG tablet  Commonly known as:  GLUCOPHAGE   1,000 mg, Oral, 2 Times Daily With Meals      methocarbamol 500 MG tablet  Commonly known as:  ROBAXIN   500 mg, Oral, 4 Times Daily, 1-2 TABS AS NEEDED EVERY 4 HRS FOR PAIN       metoprolol tartrate 25 MG tablet  Commonly known as:  LOPRESSOR   25 mg, Oral, 2 Times Daily      omeprazole 40 MG capsule  Commonly known as:  priLOSEC   40 mg, Oral, Every Morning      valsartan-hydrochlorothiazide 160-12.5 MG per tablet  Commonly known as:  DIOVAN-HCT   1 tablet, Oral, Every Evening         Stop These Medications    aspirin-acetaminophen-caffeine 250-250-65 MG per tablet  Commonly known as:  EXCEDRIN MIGRAINE           Diet Instructions      Diet: Regular, Consistent Carbohydrate      Discharge Diet:   Regular  Consistent Carbohydrate            Activity Instructions     Activity as Tolerated           Additional Instructions for the Follow-ups that You Need to Schedule     Call MD for problems / concerns.   As directed        Follow-up Information     Feli Slade MD Follow up in 1 week(s).    Specialty:  Internal Medicine  Contact information:  [address] [facility] [address]  [facility] [address]  [phone]             Gm Sanders MD Follow up.    Specialty:  General Surgery  Contact information:  [address]  [facility] [id number] [phone]             Leoncio Gonzales Jr., MD Follow up.    Specialty:  Urology  Contact information:  [address] [place] [address] [place] [id number] [phone]                  Vincenzo Slade MD  01/11/19  2:33 PM    Discharge time spent greater than 30 minutes.

## 2019-01-11 NOTE — PROGRESS NOTES
Continued Stay Note  [facility]     Patient Name: Juan J Johnson  MRN: [id number]  Today's Date: 1/11/2019    Admit Date: 1/9/2019    Discharge Plan     Row Name 01/11/19 2504       Plan    Plan  Home with spouse    Plan Comments  Attempted to check pricing for pts eliquis. Called pts [facility], spoke with Margo. Per Margo, she is inputing the pts prescription now(3:15pm) and requested CCP to call back in 15 mins. Called pharmacy on numerous occasions with no answer and voicemail picking up each time. JChasteenRN/CCP        Discharge Codes    No documentation.       Expected Discharge Date and Time     Expected Discharge Date Expected Discharge Time    Jan 11, 2019             Arlen Rodriguez RN

## 2019-01-11 NOTE — CONSULTS
REASON FOR CONSULT:    Pancreatitis and possible pancreatic mass    REQUESTING PHYSICIAN:    Vincenzo Slade M.D.    HISTORY OF PRESENT ILLNESS:    Juan J Johnson is a 73 y.o. male who was admitted to the hospital on 1/9/2019 with abdominal pain.  Pain is located in the upper abdomen and radiates to the left shoulder blade.  It was associated with nausea and vomiting.  Since admission to the hospital, the pain has subsided substantially.  Laboratory evaluation in the emergency room showed an elevated lipase to 400.  CT imaging showed inflammatory changes and edema surrounding the tail of pancreas.  There was a bulbous, dense appearance to the pancreatic tail, for which the radiologist was concerned of an underlying mass.    Past Medical History:   Diagnosis Date   • Acid reflux    • Arthritis    • Bilateral kidney stones    • Diabetes mellitus (CMS/HCC)    • Diabetes mellitus type 2, noninsulin dependent (CMS/HCC)    • High cholesterol    • Hypertension    • Sleep apnea     DOES NOT WEAR CPAP       Past Surgical History:   Procedure Laterality Date   • EXTRACORPOREAL SHOCK WAVE LITHOTRIPSY (ESWL)  2014   • FINGER/THUMB CLOSED REDUCTION AND PERCUTANEOUS PINNING Left 1982   • SINUS SURGERY     • TOTAL HIP ARTHROPLASTY Left 6/13/2016    Procedure: LT TOTAL HIP ARTHROPLASTY ANTERIOR;  Surgeon: Sandor Phillips MD;  Location: [facility] OR;  Service:    • TOTAL HIP ARTHROPLASTY Right 7/20/2016    Procedure: RT TOTAL HIP ARTHROPLASTY ANTERIOR WITH HANA TABLE;  Surgeon: Sandor Phillips MD;  Location: [facility] OR;  Service:          Current Facility-Administered Medications:   •  acetaminophen (TYLENOL) tablet 650 mg, 650 mg, Oral, Q6H PRN, Emily Torres MD, 650 mg at 01/10/19 1618  •  bisacodyl (DULCOLAX) suppository 10 mg, 10 mg, Rectal, Daily PRN, Emily Torres MD  •  calcium carbonate (TUMS) chewable tablet 500 mg (200 mg elemental), 1 tablet, Oral, BID PRN, Emily Torres MD  •  CloNIDine  (CATAPRES) tablet 0.1 mg, 0.1 mg, Oral, Q8H PRN, Emily Torres MD  •  dextrose (D50W) 25 g/ 50mL Intravenous Solution 25 g, 25 g, Intravenous, Q15 Min PRN, Emily Torres MD  •  dextrose (GLUTOSE) oral gel 15 g, 15 g, Oral, Q15 Min PRN, Emily Torres MD  •  docusate sodium (COLACE) capsule 100 mg, 100 mg, Oral, BID PRN, Emily Torres MD, 100 mg at 01/10/19 0838  •  enoxaparin (LOVENOX) syringe 80 mg, 80 mg, Subcutaneous, Q12H, Emily Torres MD, 80 mg at 01/10/19 0838  •  glucagon (human recombinant) (GLUCAGEN DIAGNOSTIC) injection 1 mg, 1 mg, Subcutaneous, PRN, Emily Torres MD  •  HYDROmorphone (DILAUDID) injection 0.5 mg, 0.5 mg, Intravenous, Q3H PRN, Emily Torres MD, 0.5 mg at 01/10/19 0100  •  insulin lispro (humaLOG) injection 0-7 Units, 0-7 Units, Subcutaneous, 4x Daily With Meals & Nightly, Emily Torres MD  •  ipratropium (ATROVENT) nasal spray 0.03%, 2 spray, Each Nare, Q12H, Emily Torres MD, 2 spray at 01/10/19 1835  •  metoprolol tartrate (LOPRESSOR) tablet 25 mg, 25 mg, Oral, BID, Emily Torres MD, 25 mg at 01/10/19 0838  •  naloxone (NARCAN) injection 0.4 mg, 0.4 mg, Intravenous, Q5 Min PRN, Emily Torres MD  •  nitroglycerin (NITROSTAT) SL tablet 0.4 mg, 0.4 mg, Sublingual, Q5 Min PRN, Emily Torres MD  •  ondansetron (ZOFRAN) tablet 4 mg, 4 mg, Oral, Q6H PRN **OR** ondansetron ODT (ZOFRAN-ODT) disintegrating tablet 4 mg, 4 mg, Oral, Q6H PRN **OR** ondansetron (ZOFRAN) injection 4 mg, 4 mg, Intravenous, Q6H PRN, Emily Torres MD  •  sodium chloride (OCEAN) nasal spray 2 spray, 2 spray, Each Nare, PRN, Emily Torres MD  •  sodium chloride 0.9 % flush 1-10 mL, 1-10 mL, Intravenous, PRN, Emily Torres MD  •  sodium chloride 0.9 % flush 3 mL, 3 mL, Intravenous, Q12H, Emily Torres MD, 3 mL at 01/09/19 2100  •  Sodium chloride 0.9 % infusion, 100  "mL/hr, Intravenous, Continuous, Vincenzo Slade MD, Last Rate: 100 mL/hr at 01/10/19 1627, 100 mL/hr at 01/10/19 1627  •  valsartan (DIOVAN) 160 mg, hydrochlorothiazide (HYDRODIURIL) 12.5 mg, , Oral, Daily, Emily Torres MD    Allergies   Allergen Reactions   • Naproxen Rash       History reviewed. No pertinent family history.    Social History     Socioeconomic History   • Marital status:      Spouse name: Not on file   • Number of children: Not on file   • Years of education: Not on file   • Highest education level: Not on file   Social Needs   • Financial resource strain: Not on file   • Food insecurity - worry: Not on file   • Food insecurity - inability: Not on file   • Transportation needs - medical: Not on file   • Transportation needs - non-medical: Not on file   Occupational History   • Not on file   Tobacco Use   • Smoking status: Never Smoker   Substance and Sexual Activity   • Alcohol use: Yes     Comment: RARELY   • Drug use: No   • Sexual activity: Defer   Other Topics Concern   • Not on file   Social History Narrative   • Not on file       Review of Systems   Constitutional: Negative for unexpected weight change.   HENT: Negative for trouble swallowing.    Respiratory: Negative for shortness of breath.    Cardiovascular: Positive for chest pain.   Gastrointestinal: Positive for abdominal pain, nausea and vomiting.   Genitourinary: Negative for dysuria.   Musculoskeletal: Positive for back pain.   Skin: Negative for color change.   Neurological: Negative for syncope.   Psychiatric/Behavioral: Negative for confusion.       Objective     /86 (BP Location: Left arm, Patient Position: Sitting)   Pulse 94   Temp 98.2 °F (36.8 °C) (Oral)   Resp 16   Ht 170.2 cm (67\")   Wt 86.2 kg (190 lb)   SpO2 97%   BMI 29.76 kg/m²     Physical Exam   Constitutional: He is oriented to person, place, and time. He appears well-developed and well-nourished. No distress.   HENT:   Head: " Normocephalic and atraumatic.   Eyes: Conjunctivae are normal. No scleral icterus.   Cardiovascular: Normal rate, regular rhythm, normal heart sounds and intact distal pulses.   No murmur heard.  Pulmonary/Chest: Effort normal and breath sounds normal. No respiratory distress. He has no wheezes. He has no rales.   Abdominal: Soft. Bowel sounds are normal. He exhibits no distension. There is no tenderness. There is no guarding.   Musculoskeletal: He exhibits no edema or deformity.   Neurological: He is alert and oriented to person, place, and time.   Skin: Skin is warm and dry.   Psychiatric: He has a normal mood and affect.   Vitals reviewed.      DIAGNOSTIC DATA:    I reviewed the images and the report of the CT scan of the abdomen and pelvis performed on 1/9/2019. It shows inflammatory changes involving the pancreatic tail. There is a small gallstone. The spleen looks normal. The radiologist suspects partial occlusion of the splenic vein adjacent to the pancreatic inflammatory process. There is a hiatal hernia.    ASSESSMENT:    Pancreatitis  Possible pancreatic mass  Hypertension  Diabetes  Nephrolithiasis    PLAN:    He is clinically improved.  I suggested allowing a few weeks for this episode to resolve and then repeat the CT imaging study with a dedicated pancreatic protocol.  Do not think there is a value to obtaining tumor markers at this time.  I will follow along in the hospital, and if he improves, I will continue the workup as an outpatient.

## 2019-01-11 NOTE — PLAN OF CARE
Problem: Patient Care Overview  Goal: Plan of Care Review  Outcome: Ongoing (interventions implemented as appropriate)   01/11/19 0538   Coping/Psychosocial   Plan of Care Reviewed With patient   Plan of Care Review   Progress improving   OTHER   Outcome Summary Has slept intervals. Med for ha x2 with good relief. Denies abd pain or nausea. Leigha cl li diet. All urine strained no stones noted. No other changes. No distress       Problem: Pancreatitis, Acute/Chronic (Adult)  Goal: Signs and Symptoms of Listed Potential Problems Will be Absent, Minimized or Managed (Pancreatitis, Acute/Chronic)  Outcome: Ongoing (interventions implemented as appropriate)      Problem: Fall Risk (Adult)  Goal: Identify Related Risk Factors and Signs and Symptoms  Outcome: Outcome(s) achieved Date Met: 01/11/19    Goal: Absence of Fall  Outcome: Ongoing (interventions implemented as appropriate)

## 2019-01-14 NOTE — PROGRESS NOTES
Case Management Discharge Note    Final Note: Pt dc'd home with spouse     Destination      No service has been selected for the patient.      Durable Medical Equipment      No service has been selected for the patient.      Dialysis/Infusion      No service has been selected for the patient.      Home Medical Care      No service has been selected for the patient.      Community Resources      No service has been selected for the patient.        Other: Other(private vehicle )    Final Discharge Disposition Code: 01 - home or self-care

## 2019-01-23 ENCOUNTER — OFFICE VISIT (OUTPATIENT)
Dept: SLEEP MEDICINE | Facility: HOSPITAL | Age: 74
End: 2019-01-23
Attending: INTERNAL MEDICINE

## 2019-01-23 VITALS
BODY MASS INDEX: 29.82 KG/M2 | DIASTOLIC BLOOD PRESSURE: 76 MMHG | SYSTOLIC BLOOD PRESSURE: 151 MMHG | HEART RATE: 91 BPM | HEIGHT: 67 IN | WEIGHT: 190 LBS

## 2019-01-23 DIAGNOSIS — G47.14 HYPERSOMNIA DUE TO MEDICAL CONDITION: ICD-10-CM

## 2019-01-23 DIAGNOSIS — G47.33 OSA (OBSTRUCTIVE SLEEP APNEA): Primary | ICD-10-CM

## 2019-01-23 PROCEDURE — G0463 HOSPITAL OUTPT CLINIC VISIT: HCPCS

## 2019-01-23 PROCEDURE — 99204 OFFICE O/P NEW MOD 45 MIN: CPT | Performed by: INTERNAL MEDICINE

## 2019-01-23 NOTE — PROGRESS NOTES
[facility] New Patient[facility]Consultation       Reason for Consultation: KENNEY    Patient Care Team:  Feli Slade MD as PCP - General (Internal Medicine)  Jesse Villegas MD as Consulting Physician (Sleep Medicine)    Chief complaint: KENNEY    History of present illness:  Thank you for asking me to see your patient.  The patient is a 73 y.o. male who has a history of KENNEY dating back to 2006.  CPAP was tried but he could never tolerate.  Over the years, symptoms are worsening and he awakens gasping for air.  He awakens with a dry mouth and has nasal congestion.  This is caused him to have anxiety.  He goes to bed between 1030 11 PM and awakens at 7 AM.  He has a headache and he is tired upon arising.  He will does all 3 or 4 times in the chair during the daytime.  His [place] Sleepiness Scale is abnormal at 21.  Witnessed apnea noted.  The patient and his wife sleep in separate rooms for many years.  He has had complaints of ILA during the nighttime.  He has problems falling asleep and his sleep is generally restless.  He will use the restroom 4 or 6 times nightly.    Review of Systems:  Recorded on the Sleep Questionnaire.  Unremarkable except for frequent urination, nasal congestion, painful joints, atypical chest pain, swollen ankles toward the end of the day, NOONAN, occasional cough, dizziness, and anxiety    History:  Past Medical History:   Diagnosis Date   • Acid reflux    • Arthritis    • Bilateral kidney stones    • Colon polyp 09/20/2017    POLYPS REMOVED DURING SCOPE   • Diabetes mellitus type 2, noninsulin dependent (CMS/HCC)    • GERD (gastroesophageal reflux disease)    • High cholesterol    • Hypertension    • Pancreatitis    • Sleep apnea     DOES NOT WEAR CPAP   ,   Past Surgical History:   Procedure Laterality Date   • ENDOSCOPY AND COLONOSCOPY N/A 2017    [facility]    • EXTRACORPOREAL SHOCK WAVE LITHOTRIPSY (ESWL) N/A 2014   • FINGER/THUMB CLOSED REDUCTION AND  "PERCUTANEOUS PINNING Left 1982    left thumb   • SINUS SURGERY N/A 1970s   • TOTAL HIP ARTHROPLASTY Left 6/13/2016    Procedure: LT TOTAL HIP ARTHROPLASTY ANTERIOR;  Surgeon: Sandor Phillips MD;  Location: [facility];  Service:    • TOTAL HIP ARTHROPLASTY Right 7/20/2016    Procedure: RT TOTAL HIP ARTHROPLASTY ANTERIOR WITH HANA TABLE;  Surgeon: Sandor Phillips MD;  Location: [facility];  Service:    ,   Family History   Problem Relation Age of Onset   • Diabetes Maternal Grandfather    • Diabetes Mother    • Hypertension Mother    • Diabetes Sister    • Hypertension Sister     and   Social History     Tobacco Use   • Smoking status: Never Smoker   Substance Use Topics   • Alcohol use: Yes     Comment: OCCASIONALLY   • Drug use: No       Social History: He is retired.  One cup of coffee a day.    Allergies:  Naproxen     Medication Review: I reviewed his medication list    Vital Signs:    Vitals:    01/23/19 1100   BP: 151/76   Pulse: 91   Weight: 86.2 kg (190 lb)   Height: 170.2 cm (67\")      Body mass index is 29.76 kg/m².  Neck Circumference: 16.5 inches      Physical Exam:  Recorded on the [facility] Physical Exam Form and is unremarkable except for: A large tongue and class III MP airway and posterior pharyngeal region not well seen     Results Review: Sleep log       Impression:   History of KENNEY, previously intolerant to CPAP with worsening signs and symptoms and complaints of hypersomnolence all consistent with worsening KENNEY.    Plan:  Good sleep hygiene measures should be maintained.  Weight loss would be beneficial in this patient who is borderline obese.    Pathophysiology of KENNEY described to the patient.  Cardiovascular complications of untreated KENNEY also reviewed.      After reviewing all with the patient and his wife, the patient agrees to an attended overnight polysomnogram with positive airway pressure titration.  This will be scheduled.    Thank you for requesting me to assist in this " patient's care.    Jesse Villegas MD  Sleep Medicine  01/23/19  11:21 AM

## 2019-01-25 ENCOUNTER — OFFICE VISIT (OUTPATIENT)
Dept: SURGERY | Facility: CLINIC | Age: 74
End: 2019-01-25

## 2019-01-25 VITALS — HEART RATE: 92 BPM | WEIGHT: 204 LBS | OXYGEN SATURATION: 98 % | BODY MASS INDEX: 32.02 KG/M2 | HEIGHT: 67 IN

## 2019-01-25 DIAGNOSIS — D49.0 PANCREATIC NEOPLASM: Primary | ICD-10-CM

## 2019-01-25 PROCEDURE — 99213 OFFICE O/P EST LOW 20 MIN: CPT | Performed by: SURGERY

## 2019-01-25 RX ORDER — GLIMEPIRIDE 1 MG/1
1 TABLET ORAL 2 TIMES DAILY
COMMUNITY

## 2019-01-25 RX ORDER — ACETAMINOPHEN 325 MG/1
650 TABLET ORAL EVERY 6 HOURS PRN
COMMUNITY
End: 2019-03-18 | Stop reason: HOSPADM

## 2019-01-25 RX ORDER — MONTELUKAST SODIUM 10 MG/1
10 TABLET ORAL NIGHTLY
COMMUNITY

## 2019-01-25 NOTE — PROGRESS NOTES
CHIEF COMPLAINT:    Possible pancreas neoplasm    HISTORY OF PRESENT ILLNESS:    Juan J Johnson is a 73 y.o. male who was admitted on 1/9/2019 for acute pancreatitis. The disease affected the tail. The initial CT showed a possible SV thrombus and was also concerning for a neoplasm of the pancreatic tail.     Was discharged from the hospital he has been tolerating his usual diet.  There've been no fevers.  There is been no nausea or vomiting.  There is no diarrhea.  There is no abdominal pain.    ROS:    Review of Systems   Constitutional: Negative for fever.   HENT: Positive for sinus pressure.    Respiratory: Positive for apnea and cough.    Gastrointestinal: Positive for abdominal distention, abdominal pain, nausea and vomiting. Negative for constipation and diarrhea.   Endocrine: Positive for polydipsia.   Genitourinary: Negative for dysuria, frequency and hematuria.   Musculoskeletal: Positive for arthralgias. Negative for myalgias.   Allergic/Immunologic: Positive for environmental allergies.   Neurological: Positive for headaches.       EXAM:    Alert. Oriented.  Lungs: Clear.  Heart: Regular.  Abdomen: Soft, no tenderness. Bowel sounds present. No palpable masses.  Extremities: Warm.    ASSESSMENT:    Recent pancreatitis- improved  Probable portal vein thrombus  Possible pancreatic mass  Possible sleep apnea  Bilateral, small, nonobstructing nephrolithiasis    PLAN:    Continue apixaban- I will need a refill today, which I will send to his pharmacy   Repeat CT on 2/7/2019 using pancreatic protocol  Will decide about further management of a pancreatic mass and continued anticoagulation for portal vein thrombus based on the outcome of the pancreatic protocol CT abdomen and pelvis.  I will call him after the CT scan is completed, and I have reviewed the images  He has appointments with urology and pulmonology regarding the nephrolithiasis and sleep apnea respectively.

## 2019-01-26 PROBLEM — G47.14 HYPERSOMNIA DUE TO MEDICAL CONDITION: Status: ACTIVE | Noted: 2019-01-26

## 2019-01-26 PROBLEM — G47.33 OSA (OBSTRUCTIVE SLEEP APNEA): Chronic | Status: ACTIVE | Noted: 2019-01-09

## 2019-02-07 ENCOUNTER — HOSPITAL ENCOUNTER (OUTPATIENT)
Dept: SLEEP MEDICINE | Facility: HOSPITAL | Age: 74
Discharge: HOME | End: 2019-02-07
Attending: INTERNAL MEDICINE | Admitting: INTERNAL MEDICINE

## 2019-02-07 DIAGNOSIS — G47.33 OSA (OBSTRUCTIVE SLEEP APNEA): ICD-10-CM

## 2019-02-07 PROCEDURE — 95810 POLYSOM 6/> YRS 4/> PARAM: CPT

## 2019-02-07 PROCEDURE — 95810 POLYSOM 6/> YRS 4/> PARAM: CPT | Performed by: INTERNAL MEDICINE

## 2019-02-09 ENCOUNTER — APPOINTMENT (OUTPATIENT)
Dept: CT IMAGING | Facility: HOSPITAL | Age: 74
End: 2019-02-09

## 2019-02-09 ENCOUNTER — HOSPITAL ENCOUNTER (INPATIENT)
Facility: HOSPITAL | Age: 74
LOS: 2 days | Discharge: HOME | End: 2019-02-12
Attending: EMERGENCY MEDICINE | Admitting: UROLOGY

## 2019-02-09 DIAGNOSIS — N17.9 AKI (ACUTE KIDNEY INJURY) (HCC): ICD-10-CM

## 2019-02-09 DIAGNOSIS — N20.1 URETEROLITHIASIS: Primary | ICD-10-CM

## 2019-02-09 DIAGNOSIS — K86.89 PANCREATIC MASS: ICD-10-CM

## 2019-02-09 DIAGNOSIS — N23 RENAL COLIC ON LEFT SIDE: ICD-10-CM

## 2019-02-09 LAB
ALBUMIN SERPL-MCNC: 4.3 G/DL (ref 3.5–5.2)
ALBUMIN/GLOB SERPL: 1.6 G/DL
ALP SERPL-CCNC: 72 U/L (ref 39–117)
ALT SERPL W P-5'-P-CCNC: 21 U/L (ref 1–41)
ANION GAP SERPL CALCULATED.3IONS-SCNC: 14.9 MMOL/L
AST SERPL-CCNC: 15 U/L (ref 1–40)
BACTERIA UR QL AUTO: ABNORMAL /HPF
BASOPHILS # BLD AUTO: 0.01 10*3/MM3 (ref 0–0.2)
BASOPHILS NFR BLD AUTO: 0.1 % (ref 0–1.5)
BILIRUB SERPL-MCNC: 0.2 MG/DL (ref 0.1–1.2)
BILIRUB UR QL STRIP: NEGATIVE
BUN BLD-MCNC: 21 MG/DL (ref 8–23)
BUN/CREAT SERPL: 15.8 (ref 7–25)
CALCIUM SPEC-SCNC: 9.4 MG/DL (ref 8.6–10.5)
CHLORIDE SERPL-SCNC: 104 MMOL/L (ref 98–107)
CLARITY UR: CLEAR
CO2 SERPL-SCNC: 24.1 MMOL/L (ref 22–29)
COLOR UR: YELLOW
CREAT BLD-MCNC: 1.33 MG/DL (ref 0.76–1.27)
DEPRECATED RDW RBC AUTO: 45.5 FL (ref 37–54)
EOSINOPHIL # BLD AUTO: 0.06 10*3/MM3 (ref 0–0.7)
EOSINOPHIL NFR BLD AUTO: 0.6 % (ref 0.3–6.2)
ERYTHROCYTE [DISTWIDTH] IN BLOOD BY AUTOMATED COUNT: 13.5 % (ref 11.5–14.5)
GFR SERPL CREATININE-BSD FRML MDRD: 53 ML/MIN/1.73
GLOBULIN UR ELPH-MCNC: 2.7 GM/DL
GLUCOSE BLD-MCNC: 128 MG/DL (ref 65–99)
GLUCOSE UR STRIP-MCNC: NEGATIVE MG/DL
HCT VFR BLD AUTO: 40 % (ref 40.4–52.2)
HGB BLD-MCNC: 12.9 G/DL (ref 13.7–17.6)
HGB UR QL STRIP.AUTO: ABNORMAL
HOLD SPECIMEN: NORMAL
HOLD SPECIMEN: NORMAL
HYALINE CASTS UR QL AUTO: ABNORMAL /LPF
IMM GRANULOCYTES # BLD AUTO: 0 10*3/MM3 (ref 0–0.03)
IMM GRANULOCYTES NFR BLD AUTO: 0 % (ref 0–0.5)
KETONES UR QL STRIP: ABNORMAL
LEUKOCYTE ESTERASE UR QL STRIP.AUTO: NEGATIVE
LIPASE SERPL-CCNC: 43 U/L (ref 13–60)
LYMPHOCYTES # BLD AUTO: 1.15 10*3/MM3 (ref 0.9–4.8)
LYMPHOCYTES NFR BLD AUTO: 11.9 % (ref 19.6–45.3)
MCH RBC QN AUTO: 30.1 PG (ref 27–32.7)
MCHC RBC AUTO-ENTMCNC: 32.3 G/DL (ref 32.6–36.4)
MCV RBC AUTO: 93.5 FL (ref 79.8–96.2)
MONOCYTES # BLD AUTO: 0.56 10*3/MM3 (ref 0.2–1.2)
MONOCYTES NFR BLD AUTO: 5.8 % (ref 5–12)
NEUTROPHILS # BLD AUTO: 7.87 10*3/MM3 (ref 1.9–8.1)
NEUTROPHILS NFR BLD AUTO: 81.6 % (ref 42.7–76)
NITRITE UR QL STRIP: NEGATIVE
PH UR STRIP.AUTO: <=5 [PH] (ref 5–8)
PLATELET # BLD AUTO: 186 10*3/MM3 (ref 140–500)
PMV BLD AUTO: 10.1 FL (ref 6–12)
POTASSIUM BLD-SCNC: 4.1 MMOL/L (ref 3.5–5.2)
PROT SERPL-MCNC: 7 G/DL (ref 6–8.5)
PROT UR QL STRIP: NEGATIVE
RBC # BLD AUTO: 4.28 10*6/MM3 (ref 4.6–6)
RBC # UR: ABNORMAL /HPF
REF LAB TEST METHOD: ABNORMAL
SODIUM BLD-SCNC: 143 MMOL/L (ref 136–145)
SP GR UR STRIP: 1.02 (ref 1–1.03)
SQUAMOUS #/AREA URNS HPF: ABNORMAL /HPF
UROBILINOGEN UR QL STRIP: ABNORMAL
WBC NRBC COR # BLD: 9.65 10*3/MM3 (ref 4.5–10.7)
WBC UR QL AUTO: ABNORMAL /HPF
WHOLE BLOOD HOLD SPECIMEN: NORMAL
WHOLE BLOOD HOLD SPECIMEN: NORMAL

## 2019-02-09 PROCEDURE — 83690 ASSAY OF LIPASE: CPT | Performed by: EMERGENCY MEDICINE

## 2019-02-09 PROCEDURE — 25010000002 HYDROMORPHONE PER 4 MG: Performed by: EMERGENCY MEDICINE

## 2019-02-09 PROCEDURE — 25010000002 ONDANSETRON PER 1 MG: Performed by: EMERGENCY MEDICINE

## 2019-02-09 PROCEDURE — 25010000002 MORPHINE PER 10 MG: Performed by: EMERGENCY MEDICINE

## 2019-02-09 PROCEDURE — 0 DIATRIZOATE MEGLUMINE & SODIUM PER 1 ML: Performed by: EMERGENCY MEDICINE

## 2019-02-09 PROCEDURE — 25010000002 IOPAMIDOL 61 % SOLUTION: Performed by: EMERGENCY MEDICINE

## 2019-02-09 PROCEDURE — 74177 CT ABD & PELVIS W/CONTRAST: CPT

## 2019-02-09 PROCEDURE — 85025 COMPLETE CBC W/AUTO DIFF WBC: CPT | Performed by: EMERGENCY MEDICINE

## 2019-02-09 PROCEDURE — 80053 COMPREHEN METABOLIC PANEL: CPT | Performed by: EMERGENCY MEDICINE

## 2019-02-09 PROCEDURE — 81001 URINALYSIS AUTO W/SCOPE: CPT | Performed by: EMERGENCY MEDICINE

## 2019-02-09 PROCEDURE — 99285 EMERGENCY DEPT VISIT HI MDM: CPT

## 2019-02-09 RX ORDER — HYDROMORPHONE HYDROCHLORIDE 1 MG/ML
0.5 INJECTION, SOLUTION INTRAMUSCULAR; INTRAVENOUS; SUBCUTANEOUS ONCE
Status: COMPLETED | OUTPATIENT
Start: 2019-02-09 | End: 2019-02-09

## 2019-02-09 RX ORDER — MORPHINE SULFATE 2 MG/ML
4 INJECTION, SOLUTION INTRAMUSCULAR; INTRAVENOUS ONCE
Status: COMPLETED | OUTPATIENT
Start: 2019-02-09 | End: 2019-02-09

## 2019-02-09 RX ORDER — SODIUM CHLORIDE 0.9 % (FLUSH) 0.9 %
10 SYRINGE (ML) INJECTION AS NEEDED
Status: DISCONTINUED | OUTPATIENT
Start: 2019-02-09 | End: 2019-02-12 | Stop reason: HOSPADM

## 2019-02-09 RX ORDER — ONDANSETRON 2 MG/ML
4 INJECTION INTRAMUSCULAR; INTRAVENOUS ONCE
Status: COMPLETED | OUTPATIENT
Start: 2019-02-09 | End: 2019-02-09

## 2019-02-09 RX ADMIN — HYDROMORPHONE HYDROCHLORIDE 0.5 MG: 1 INJECTION, SOLUTION INTRAMUSCULAR; INTRAVENOUS; SUBCUTANEOUS at 22:23

## 2019-02-09 RX ADMIN — SODIUM CHLORIDE 1000 ML: 9 INJECTION, SOLUTION INTRAVENOUS at 20:21

## 2019-02-09 RX ADMIN — MORPHINE SULFATE 4 MG: 2 INJECTION, SOLUTION INTRAMUSCULAR; INTRAVENOUS at 20:23

## 2019-02-09 RX ADMIN — IOPAMIDOL 85 ML: 612 INJECTION, SOLUTION INTRAVENOUS at 22:44

## 2019-02-09 RX ADMIN — ONDANSETRON HYDROCHLORIDE 4 MG: 2 SOLUTION INTRAMUSCULAR; INTRAVENOUS at 20:21

## 2019-02-09 RX ADMIN — HYDROMORPHONE HYDROCHLORIDE 0.5 MG: 1 INJECTION, SOLUTION INTRAMUSCULAR; INTRAVENOUS; SUBCUTANEOUS at 20:46

## 2019-02-09 RX ADMIN — DIATRIZOATE MEGLUMINE AND DIATRIZOATE SODIUM 30 ML: 600; 100 SOLUTION ORAL; RECTAL at 21:30

## 2019-02-10 PROBLEM — K86.89 PANCREATIC MASS: Status: ACTIVE | Noted: 2019-02-10

## 2019-02-10 PROBLEM — G47.30 SLEEP APNEA: Status: ACTIVE | Noted: 2019-02-10

## 2019-02-10 PROBLEM — N20.1 URETEROLITHIASIS: Status: ACTIVE | Noted: 2019-02-10

## 2019-02-10 PROBLEM — N17.9 AKI (ACUTE KIDNEY INJURY) (HCC): Status: ACTIVE | Noted: 2019-02-10

## 2019-02-10 PROBLEM — I10 HTN (HYPERTENSION): Status: ACTIVE | Noted: 2019-02-10

## 2019-02-10 PROBLEM — E11.9 DM II (DIABETES MELLITUS, TYPE II), CONTROLLED (HCC): Status: ACTIVE | Noted: 2019-02-10

## 2019-02-10 PROBLEM — I82.890 SPLENIC VEIN THROMBOSIS: Status: ACTIVE | Noted: 2019-02-10

## 2019-02-10 PROBLEM — Z79.01 LONG TERM CURRENT USE OF ANTICOAGULANT THERAPY: Status: ACTIVE | Noted: 2019-02-10

## 2019-02-10 LAB
ANION GAP SERPL CALCULATED.3IONS-SCNC: 13.8 MMOL/L
BASOPHILS # BLD AUTO: 0.02 10*3/MM3 (ref 0–0.2)
BASOPHILS NFR BLD AUTO: 0.2 % (ref 0–1.5)
BUN BLD-MCNC: 19 MG/DL (ref 8–23)
BUN/CREAT SERPL: 14.4 (ref 7–25)
CALCIUM SPEC-SCNC: 9.3 MG/DL (ref 8.6–10.5)
CANCER AG19-9 SERPL-ACNC: 7.8 U/ML
CEA SERPL-MCNC: 1.03 NG/ML
CHLORIDE SERPL-SCNC: 103 MMOL/L (ref 98–107)
CO2 SERPL-SCNC: 24.2 MMOL/L (ref 22–29)
CREAT BLD-MCNC: 1.32 MG/DL (ref 0.76–1.27)
DEPRECATED RDW RBC AUTO: 47.3 FL (ref 37–54)
EOSINOPHIL # BLD AUTO: 0.05 10*3/MM3 (ref 0–0.7)
EOSINOPHIL NFR BLD AUTO: 0.6 % (ref 0.3–6.2)
ERYTHROCYTE [DISTWIDTH] IN BLOOD BY AUTOMATED COUNT: 13.7 % (ref 11.5–14.5)
GFR SERPL CREATININE-BSD FRML MDRD: 53 ML/MIN/1.73
GLUCOSE BLD-MCNC: 96 MG/DL (ref 65–99)
GLUCOSE BLDC GLUCOMTR-MCNC: 105 MG/DL (ref 70–130)
GLUCOSE BLDC GLUCOMTR-MCNC: 116 MG/DL (ref 70–130)
GLUCOSE BLDC GLUCOMTR-MCNC: 95 MG/DL (ref 70–130)
GLUCOSE BLDC GLUCOMTR-MCNC: 96 MG/DL (ref 70–130)
HCT VFR BLD AUTO: 39.3 % (ref 40.4–52.2)
HGB BLD-MCNC: 12.5 G/DL (ref 13.7–17.6)
IMM GRANULOCYTES # BLD AUTO: 0.02 10*3/MM3 (ref 0–0.03)
IMM GRANULOCYTES NFR BLD AUTO: 0.2 % (ref 0–0.5)
LYMPHOCYTES # BLD AUTO: 1.18 10*3/MM3 (ref 0.9–4.8)
LYMPHOCYTES NFR BLD AUTO: 13.9 % (ref 19.6–45.3)
MCH RBC QN AUTO: 30.3 PG (ref 27–32.7)
MCHC RBC AUTO-ENTMCNC: 31.8 G/DL (ref 32.6–36.4)
MCV RBC AUTO: 95.2 FL (ref 79.8–96.2)
MONOCYTES # BLD AUTO: 0.91 10*3/MM3 (ref 0.2–1.2)
MONOCYTES NFR BLD AUTO: 10.8 % (ref 5–12)
NEUTROPHILS # BLD AUTO: 6.28 10*3/MM3 (ref 1.9–8.1)
NEUTROPHILS NFR BLD AUTO: 74.3 % (ref 42.7–76)
PLATELET # BLD AUTO: 168 10*3/MM3 (ref 140–500)
PMV BLD AUTO: 9.9 FL (ref 6–12)
POTASSIUM BLD-SCNC: 4.4 MMOL/L (ref 3.5–5.2)
RBC # BLD AUTO: 4.13 10*6/MM3 (ref 4.6–6)
SODIUM BLD-SCNC: 141 MMOL/L (ref 136–145)
WBC NRBC COR # BLD: 8.46 10*3/MM3 (ref 4.5–10.7)

## 2019-02-10 PROCEDURE — 82378 CARCINOEMBRYONIC ANTIGEN: CPT | Performed by: SURGERY

## 2019-02-10 PROCEDURE — 86301 IMMUNOASSAY TUMOR CA 19-9: CPT | Performed by: SURGERY

## 2019-02-10 PROCEDURE — 25010000002 LEVOFLOXACIN PER 250 MG: Performed by: UROLOGY

## 2019-02-10 PROCEDURE — 82962 GLUCOSE BLOOD TEST: CPT

## 2019-02-10 PROCEDURE — 25010000002 HYDROMORPHONE 1 MG/ML SOLUTION: Performed by: INTERNAL MEDICINE

## 2019-02-10 PROCEDURE — 99221 1ST HOSP IP/OBS SF/LOW 40: CPT | Performed by: SURGERY

## 2019-02-10 PROCEDURE — 85025 COMPLETE CBC W/AUTO DIFF WBC: CPT | Performed by: INTERNAL MEDICINE

## 2019-02-10 PROCEDURE — 25010000002 HYDROMORPHONE PER 4 MG: Performed by: EMERGENCY MEDICINE

## 2019-02-10 PROCEDURE — 80048 BASIC METABOLIC PNL TOTAL CA: CPT | Performed by: INTERNAL MEDICINE

## 2019-02-10 PROCEDURE — 25010000002 ONDANSETRON PER 1 MG: Performed by: INTERNAL MEDICINE

## 2019-02-10 RX ORDER — DEXTROSE MONOHYDRATE 25 G/50ML
25 INJECTION, SOLUTION INTRAVENOUS
Status: DISCONTINUED | OUTPATIENT
Start: 2019-02-10 | End: 2019-02-12 | Stop reason: HOSPADM

## 2019-02-10 RX ORDER — HYDROMORPHONE HYDROCHLORIDE 1 MG/ML
0.5 INJECTION, SOLUTION INTRAMUSCULAR; INTRAVENOUS; SUBCUTANEOUS ONCE
Status: COMPLETED | OUTPATIENT
Start: 2019-02-10 | End: 2019-02-10

## 2019-02-10 RX ORDER — CALCIUM CARBONATE 200(500)MG
2 TABLET,CHEWABLE ORAL 2 TIMES DAILY PRN
Status: DISCONTINUED | OUTPATIENT
Start: 2019-02-10 | End: 2019-02-12 | Stop reason: HOSPADM

## 2019-02-10 RX ORDER — NICOTINE POLACRILEX 4 MG
15 LOZENGE BUCCAL
Status: DISCONTINUED | OUTPATIENT
Start: 2019-02-10 | End: 2019-02-12 | Stop reason: HOSPADM

## 2019-02-10 RX ORDER — SODIUM CHLORIDE 0.9 % (FLUSH) 0.9 %
3-10 SYRINGE (ML) INJECTION AS NEEDED
Status: DISCONTINUED | OUTPATIENT
Start: 2019-02-10 | End: 2019-02-12 | Stop reason: HOSPADM

## 2019-02-10 RX ORDER — SODIUM CHLORIDE 0.9 % (FLUSH) 0.9 %
3 SYRINGE (ML) INJECTION EVERY 12 HOURS SCHEDULED
Status: DISCONTINUED | OUTPATIENT
Start: 2019-02-10 | End: 2019-02-12 | Stop reason: HOSPADM

## 2019-02-10 RX ORDER — DOCUSATE SODIUM 100 MG/1
100 CAPSULE, LIQUID FILLED ORAL 2 TIMES DAILY
Status: DISCONTINUED | OUTPATIENT
Start: 2019-02-10 | End: 2019-02-12 | Stop reason: HOSPADM

## 2019-02-10 RX ORDER — SODIUM CHLORIDE 9 MG/ML
50 INJECTION, SOLUTION INTRAVENOUS CONTINUOUS
Status: DISCONTINUED | OUTPATIENT
Start: 2019-02-10 | End: 2019-02-12

## 2019-02-10 RX ORDER — VALSARTAN 160 MG/1
160 TABLET ORAL
Status: DISCONTINUED | OUTPATIENT
Start: 2019-02-11 | End: 2019-02-12 | Stop reason: HOSPADM

## 2019-02-10 RX ORDER — ONDANSETRON 2 MG/ML
4 INJECTION INTRAMUSCULAR; INTRAVENOUS EVERY 6 HOURS PRN
Status: DISCONTINUED | OUTPATIENT
Start: 2019-02-10 | End: 2019-02-12 | Stop reason: HOSPADM

## 2019-02-10 RX ORDER — CHOLECALCIFEROL (VITAMIN D3) 125 MCG
5 CAPSULE ORAL NIGHTLY PRN
Status: DISCONTINUED | OUTPATIENT
Start: 2019-02-10 | End: 2019-02-12 | Stop reason: HOSPADM

## 2019-02-10 RX ORDER — ONDANSETRON 4 MG/1
4 TABLET, ORALLY DISINTEGRATING ORAL EVERY 6 HOURS PRN
Status: DISCONTINUED | OUTPATIENT
Start: 2019-02-10 | End: 2019-02-12 | Stop reason: HOSPADM

## 2019-02-10 RX ORDER — ACETAMINOPHEN 325 MG/1
650 TABLET ORAL EVERY 4 HOURS PRN
Status: DISCONTINUED | OUTPATIENT
Start: 2019-02-10 | End: 2019-02-12 | Stop reason: HOSPADM

## 2019-02-10 RX ORDER — ATORVASTATIN CALCIUM 20 MG/1
40 TABLET, FILM COATED ORAL EVERY EVENING
Status: DISCONTINUED | OUTPATIENT
Start: 2019-02-10 | End: 2019-02-12 | Stop reason: HOSPADM

## 2019-02-10 RX ORDER — MONTELUKAST SODIUM 10 MG/1
10 TABLET ORAL NIGHTLY
Status: DISCONTINUED | OUTPATIENT
Start: 2019-02-10 | End: 2019-02-12 | Stop reason: HOSPADM

## 2019-02-10 RX ORDER — PANTOPRAZOLE SODIUM 40 MG/1
40 TABLET, DELAYED RELEASE ORAL EVERY MORNING
Status: DISCONTINUED | OUTPATIENT
Start: 2019-02-10 | End: 2019-02-12 | Stop reason: HOSPADM

## 2019-02-10 RX ORDER — ONDANSETRON 4 MG/1
4 TABLET, FILM COATED ORAL EVERY 6 HOURS PRN
Status: DISCONTINUED | OUTPATIENT
Start: 2019-02-10 | End: 2019-02-12 | Stop reason: HOSPADM

## 2019-02-10 RX ORDER — ACETAMINOPHEN 325 MG/1
650 TABLET ORAL EVERY 4 HOURS PRN
Status: DISCONTINUED | OUTPATIENT
Start: 2019-02-10 | End: 2019-02-10

## 2019-02-10 RX ORDER — LEVOFLOXACIN 5 MG/ML
500 INJECTION, SOLUTION INTRAVENOUS EVERY 24 HOURS
Status: DISCONTINUED | OUTPATIENT
Start: 2019-02-10 | End: 2019-02-12 | Stop reason: HOSPADM

## 2019-02-10 RX ORDER — HYDROCODONE BITARTRATE AND ACETAMINOPHEN 5; 325 MG/1; MG/1
1 TABLET ORAL EVERY 4 HOURS PRN
Status: DISCONTINUED | OUTPATIENT
Start: 2019-02-10 | End: 2019-02-12 | Stop reason: HOSPADM

## 2019-02-10 RX ORDER — OXYCODONE HYDROCHLORIDE AND ACETAMINOPHEN 5; 325 MG/1; MG/1
1 TABLET ORAL EVERY 4 HOURS PRN
Status: DISCONTINUED | OUTPATIENT
Start: 2019-02-10 | End: 2019-02-10

## 2019-02-10 RX ORDER — NALOXONE HCL 0.4 MG/ML
0.4 VIAL (ML) INJECTION
Status: DISCONTINUED | OUTPATIENT
Start: 2019-02-10 | End: 2019-02-12 | Stop reason: HOSPADM

## 2019-02-10 RX ADMIN — LEVOFLOXACIN 500 MG: 5 INJECTION, SOLUTION INTRAVENOUS at 14:52

## 2019-02-10 RX ADMIN — HYDROMORPHONE HYDROCHLORIDE 0.5 MG: 1 INJECTION, SOLUTION INTRAMUSCULAR; INTRAVENOUS; SUBCUTANEOUS at 09:19

## 2019-02-10 RX ADMIN — DOCUSATE SODIUM 100 MG: 100 CAPSULE, LIQUID FILLED ORAL at 20:29

## 2019-02-10 RX ADMIN — SODIUM CHLORIDE 100 ML/HR: 9 INJECTION, SOLUTION INTRAVENOUS at 03:49

## 2019-02-10 RX ADMIN — ONDANSETRON HYDROCHLORIDE 4 MG: 2 SOLUTION INTRAMUSCULAR; INTRAVENOUS at 08:20

## 2019-02-10 RX ADMIN — OXYCODONE HYDROCHLORIDE AND ACETAMINOPHEN 1 TABLET: 5; 325 TABLET ORAL at 08:47

## 2019-02-10 RX ADMIN — HYDROMORPHONE HYDROCHLORIDE 0.5 MG: 1 INJECTION, SOLUTION INTRAMUSCULAR; INTRAVENOUS; SUBCUTANEOUS at 07:00

## 2019-02-10 RX ADMIN — ONDANSETRON HYDROCHLORIDE 4 MG: 2 SOLUTION INTRAMUSCULAR; INTRAVENOUS at 02:40

## 2019-02-10 RX ADMIN — SODIUM CHLORIDE 125 ML/HR: 9 INJECTION, SOLUTION INTRAVENOUS at 13:44

## 2019-02-10 RX ADMIN — SODIUM CHLORIDE 125 ML/HR: 9 INJECTION, SOLUTION INTRAVENOUS at 22:49

## 2019-02-10 RX ADMIN — SODIUM CHLORIDE, PRESERVATIVE FREE 3 ML: 5 INJECTION INTRAVENOUS at 08:22

## 2019-02-10 RX ADMIN — HYDROMORPHONE HYDROCHLORIDE 0.5 MG: 1 INJECTION, SOLUTION INTRAMUSCULAR; INTRAVENOUS; SUBCUTANEOUS at 00:19

## 2019-02-10 RX ADMIN — OXYCODONE HYDROCHLORIDE AND ACETAMINOPHEN 1 TABLET: 5; 325 TABLET ORAL at 03:49

## 2019-02-10 RX ADMIN — ACETAMINOPHEN 650 MG: 325 TABLET, FILM COATED ORAL at 14:52

## 2019-02-10 RX ADMIN — SODIUM CHLORIDE, PRESERVATIVE FREE 3 ML: 5 INJECTION INTRAVENOUS at 20:33

## 2019-02-10 RX ADMIN — Medication 5 MG: at 22:49

## 2019-02-10 RX ADMIN — METOPROLOL TARTRATE 25 MG: 25 TABLET ORAL at 14:52

## 2019-02-10 RX ADMIN — ATORVASTATIN CALCIUM 40 MG: 20 TABLET, FILM COATED ORAL at 18:55

## 2019-02-10 RX ADMIN — MONTELUKAST SODIUM 10 MG: 10 TABLET, FILM COATED ORAL at 20:29

## 2019-02-10 RX ADMIN — METOPROLOL TARTRATE 25 MG: 25 TABLET ORAL at 20:29

## 2019-02-11 ENCOUNTER — APPOINTMENT (OUTPATIENT)
Dept: GENERAL RADIOLOGY | Facility: HOSPITAL | Age: 74
End: 2019-02-11

## 2019-02-11 ENCOUNTER — ANESTHESIA (OUTPATIENT)
Dept: PERIOP | Facility: HOSPITAL | Age: 74
End: 2019-02-11

## 2019-02-11 ENCOUNTER — ANESTHESIA EVENT (OUTPATIENT)
Dept: PERIOP | Facility: HOSPITAL | Age: 74
End: 2019-02-11

## 2019-02-11 LAB
ANION GAP SERPL CALCULATED.3IONS-SCNC: 12.5 MMOL/L
BASOPHILS # BLD AUTO: 0.01 10*3/MM3 (ref 0–0.2)
BASOPHILS NFR BLD AUTO: 0.1 % (ref 0–1.5)
BUN BLD-MCNC: 12 MG/DL (ref 8–23)
BUN/CREAT SERPL: 9.5 (ref 7–25)
CALCIUM SPEC-SCNC: 9.1 MG/DL (ref 8.6–10.5)
CHLORIDE SERPL-SCNC: 102 MMOL/L (ref 98–107)
CO2 SERPL-SCNC: 26.5 MMOL/L (ref 22–29)
CREAT BLD-MCNC: 1.26 MG/DL (ref 0.76–1.27)
DEPRECATED RDW RBC AUTO: 46.6 FL (ref 37–54)
EOSINOPHIL # BLD AUTO: 0.07 10*3/MM3 (ref 0–0.7)
EOSINOPHIL NFR BLD AUTO: 0.9 % (ref 0.3–6.2)
ERYTHROCYTE [DISTWIDTH] IN BLOOD BY AUTOMATED COUNT: 13.7 % (ref 11.5–14.5)
GFR SERPL CREATININE-BSD FRML MDRD: 56 ML/MIN/1.73
GLUCOSE BLD-MCNC: 96 MG/DL (ref 65–99)
GLUCOSE BLDC GLUCOMTR-MCNC: 102 MG/DL (ref 70–130)
GLUCOSE BLDC GLUCOMTR-MCNC: 133 MG/DL (ref 70–130)
GLUCOSE BLDC GLUCOMTR-MCNC: 162 MG/DL (ref 70–130)
GLUCOSE BLDC GLUCOMTR-MCNC: 99 MG/DL (ref 70–130)
HBA1C MFR BLD: 6.38 % (ref 4.8–5.6)
HCT VFR BLD AUTO: 37.3 % (ref 40.4–52.2)
HGB BLD-MCNC: 11.7 G/DL (ref 13.7–17.6)
IMM GRANULOCYTES # BLD AUTO: 0.02 10*3/MM3 (ref 0–0.03)
IMM GRANULOCYTES NFR BLD AUTO: 0.3 % (ref 0–0.5)
LYMPHOCYTES # BLD AUTO: 1.04 10*3/MM3 (ref 0.9–4.8)
LYMPHOCYTES NFR BLD AUTO: 13.9 % (ref 19.6–45.3)
MCH RBC QN AUTO: 29.5 PG (ref 27–32.7)
MCHC RBC AUTO-ENTMCNC: 31.4 G/DL (ref 32.6–36.4)
MCV RBC AUTO: 94.2 FL (ref 79.8–96.2)
MONOCYTES # BLD AUTO: 0.83 10*3/MM3 (ref 0.2–1.2)
MONOCYTES NFR BLD AUTO: 11.1 % (ref 5–12)
NEUTROPHILS # BLD AUTO: 5.51 10*3/MM3 (ref 1.9–8.1)
NEUTROPHILS NFR BLD AUTO: 73.7 % (ref 42.7–76)
PLATELET # BLD AUTO: 167 10*3/MM3 (ref 140–500)
PMV BLD AUTO: 10 FL (ref 6–12)
POTASSIUM BLD-SCNC: 4.5 MMOL/L (ref 3.5–5.2)
RBC # BLD AUTO: 3.96 10*6/MM3 (ref 4.6–6)
SODIUM BLD-SCNC: 141 MMOL/L (ref 136–145)
WBC NRBC COR # BLD: 7.48 10*3/MM3 (ref 4.5–10.7)

## 2019-02-11 PROCEDURE — 82962 GLUCOSE BLOOD TEST: CPT

## 2019-02-11 PROCEDURE — 25010000002 ONDANSETRON PER 1 MG: Performed by: NURSE ANESTHETIST, CERTIFIED REGISTERED

## 2019-02-11 PROCEDURE — 63710000001 INSULIN LISPRO (HUMAN) PER 5 UNITS: Performed by: HOSPITALIST

## 2019-02-11 PROCEDURE — 74018 RADEX ABDOMEN 1 VIEW: CPT

## 2019-02-11 PROCEDURE — 25010000002 FENTANYL CITRATE (PF) 100 MCG/2ML SOLUTION: Performed by: NURSE ANESTHETIST, CERTIFIED REGISTERED

## 2019-02-11 PROCEDURE — 0T778DZ DILATION OF LEFT URETER WITH INTRALUMINAL DEVICE, VIA NATURAL OR ARTIFICIAL OPENING ENDOSCOPIC: ICD-10-PCS | Performed by: UROLOGY

## 2019-02-11 PROCEDURE — 25010000002 PROPOFOL 10 MG/ML EMULSION: Performed by: NURSE ANESTHETIST, CERTIFIED REGISTERED

## 2019-02-11 PROCEDURE — C2617 STENT, NON-COR, TEM W/O DEL: HCPCS | Performed by: UROLOGY

## 2019-02-11 PROCEDURE — 76000 FLUOROSCOPY <1 HR PHYS/QHP: CPT

## 2019-02-11 PROCEDURE — 85025 COMPLETE CBC W/AUTO DIFF WBC: CPT | Performed by: INTERNAL MEDICINE

## 2019-02-11 PROCEDURE — 25010000002 DEXAMETHASONE PER 1 MG: Performed by: NURSE ANESTHETIST, CERTIFIED REGISTERED

## 2019-02-11 PROCEDURE — 25010000002 HYDROMORPHONE 1 MG/ML SOLUTION: Performed by: INTERNAL MEDICINE

## 2019-02-11 PROCEDURE — 80048 BASIC METABOLIC PNL TOTAL CA: CPT | Performed by: INTERNAL MEDICINE

## 2019-02-11 PROCEDURE — 25010000002 MIDAZOLAM PER 1 MG: Performed by: ANESTHESIOLOGY

## 2019-02-11 PROCEDURE — 25010000002 LEVOFLOXACIN PER 250 MG: Performed by: UROLOGY

## 2019-02-11 PROCEDURE — 99232 SBSQ HOSP IP/OBS MODERATE 35: CPT | Performed by: SURGERY

## 2019-02-11 PROCEDURE — C1769 GUIDE WIRE: HCPCS | Performed by: UROLOGY

## 2019-02-11 PROCEDURE — 83036 HEMOGLOBIN GLYCOSYLATED A1C: CPT | Performed by: HOSPITALIST

## 2019-02-11 DEVICE — URETERAL STENT
Type: IMPLANTABLE DEVICE | Site: URETER | Status: FUNCTIONAL
Brand: CONTOUR™

## 2019-02-11 RX ORDER — DEXAMETHASONE SODIUM PHOSPHATE 10 MG/ML
INJECTION INTRAMUSCULAR; INTRAVENOUS AS NEEDED
Status: DISCONTINUED | OUTPATIENT
Start: 2019-02-11 | End: 2019-02-11 | Stop reason: SURG

## 2019-02-11 RX ORDER — ACETAMINOPHEN 325 MG/1
650 TABLET ORAL ONCE AS NEEDED
Status: DISCONTINUED | OUTPATIENT
Start: 2019-02-11 | End: 2019-02-11 | Stop reason: HOSPADM

## 2019-02-11 RX ORDER — PROMETHAZINE HYDROCHLORIDE 25 MG/ML
12.5 INJECTION, SOLUTION INTRAMUSCULAR; INTRAVENOUS ONCE AS NEEDED
Status: DISCONTINUED | OUTPATIENT
Start: 2019-02-11 | End: 2019-02-11 | Stop reason: HOSPADM

## 2019-02-11 RX ORDER — SODIUM CHLORIDE 0.9 % (FLUSH) 0.9 %
1-10 SYRINGE (ML) INJECTION AS NEEDED
Status: DISCONTINUED | OUTPATIENT
Start: 2019-02-11 | End: 2019-02-11 | Stop reason: HOSPADM

## 2019-02-11 RX ORDER — MAGNESIUM HYDROXIDE 1200 MG/15ML
LIQUID ORAL AS NEEDED
Status: DISCONTINUED | OUTPATIENT
Start: 2019-02-11 | End: 2019-02-11 | Stop reason: HOSPADM

## 2019-02-11 RX ORDER — FENTANYL CITRATE 50 UG/ML
INJECTION, SOLUTION INTRAMUSCULAR; INTRAVENOUS AS NEEDED
Status: DISCONTINUED | OUTPATIENT
Start: 2019-02-11 | End: 2019-02-11 | Stop reason: SURG

## 2019-02-11 RX ORDER — ONDANSETRON 2 MG/ML
4 INJECTION INTRAMUSCULAR; INTRAVENOUS ONCE AS NEEDED
Status: DISCONTINUED | OUTPATIENT
Start: 2019-02-11 | End: 2019-02-11 | Stop reason: HOSPADM

## 2019-02-11 RX ORDER — LIDOCAINE HYDROCHLORIDE 20 MG/ML
INJECTION, SOLUTION INFILTRATION; PERINEURAL AS NEEDED
Status: DISCONTINUED | OUTPATIENT
Start: 2019-02-11 | End: 2019-02-11 | Stop reason: SURG

## 2019-02-11 RX ORDER — LIDOCAINE HYDROCHLORIDE 10 MG/ML
0.5 INJECTION, SOLUTION EPIDURAL; INFILTRATION; INTRACAUDAL; PERINEURAL ONCE AS NEEDED
Status: DISCONTINUED | OUTPATIENT
Start: 2019-02-11 | End: 2019-02-11 | Stop reason: HOSPADM

## 2019-02-11 RX ORDER — PROPOFOL 10 MG/ML
VIAL (ML) INTRAVENOUS AS NEEDED
Status: DISCONTINUED | OUTPATIENT
Start: 2019-02-11 | End: 2019-02-11 | Stop reason: SURG

## 2019-02-11 RX ORDER — EPHEDRINE SULFATE 50 MG/ML
5 INJECTION, SOLUTION INTRAVENOUS ONCE AS NEEDED
Status: DISCONTINUED | OUTPATIENT
Start: 2019-02-11 | End: 2019-02-11 | Stop reason: HOSPADM

## 2019-02-11 RX ORDER — HYDROCODONE BITARTRATE AND ACETAMINOPHEN 7.5; 325 MG/1; MG/1
1 TABLET ORAL ONCE AS NEEDED
Status: DISCONTINUED | OUTPATIENT
Start: 2019-02-11 | End: 2019-02-11 | Stop reason: HOSPADM

## 2019-02-11 RX ORDER — FENTANYL CITRATE 50 UG/ML
50 INJECTION, SOLUTION INTRAMUSCULAR; INTRAVENOUS
Status: DISCONTINUED | OUTPATIENT
Start: 2019-02-11 | End: 2019-02-11 | Stop reason: HOSPADM

## 2019-02-11 RX ORDER — SODIUM CHLORIDE, SODIUM LACTATE, POTASSIUM CHLORIDE, CALCIUM CHLORIDE 600; 310; 30; 20 MG/100ML; MG/100ML; MG/100ML; MG/100ML
9 INJECTION, SOLUTION INTRAVENOUS CONTINUOUS
Status: DISCONTINUED | OUTPATIENT
Start: 2019-02-11 | End: 2019-02-12 | Stop reason: HOSPADM

## 2019-02-11 RX ORDER — FAMOTIDINE 10 MG/ML
20 INJECTION, SOLUTION INTRAVENOUS ONCE
Status: COMPLETED | OUTPATIENT
Start: 2019-02-11 | End: 2019-02-11

## 2019-02-11 RX ORDER — OXYCODONE AND ACETAMINOPHEN 7.5; 325 MG/1; MG/1
1 TABLET ORAL ONCE AS NEEDED
Status: DISCONTINUED | OUTPATIENT
Start: 2019-02-11 | End: 2019-02-11 | Stop reason: HOSPADM

## 2019-02-11 RX ORDER — MIDAZOLAM HYDROCHLORIDE 1 MG/ML
1 INJECTION INTRAMUSCULAR; INTRAVENOUS
Status: DISCONTINUED | OUTPATIENT
Start: 2019-02-11 | End: 2019-02-11 | Stop reason: HOSPADM

## 2019-02-11 RX ORDER — HYDROMORPHONE HYDROCHLORIDE 1 MG/ML
0.5 INJECTION, SOLUTION INTRAMUSCULAR; INTRAVENOUS; SUBCUTANEOUS
Status: DISCONTINUED | OUTPATIENT
Start: 2019-02-11 | End: 2019-02-11 | Stop reason: HOSPADM

## 2019-02-11 RX ORDER — MIDAZOLAM HYDROCHLORIDE 1 MG/ML
2 INJECTION INTRAMUSCULAR; INTRAVENOUS
Status: DISCONTINUED | OUTPATIENT
Start: 2019-02-11 | End: 2019-02-11 | Stop reason: HOSPADM

## 2019-02-11 RX ORDER — DIPHENHYDRAMINE HCL 25 MG
25 CAPSULE ORAL
Status: DISCONTINUED | OUTPATIENT
Start: 2019-02-11 | End: 2019-02-11 | Stop reason: HOSPADM

## 2019-02-11 RX ORDER — FLUMAZENIL 0.1 MG/ML
0.2 INJECTION INTRAVENOUS AS NEEDED
Status: DISCONTINUED | OUTPATIENT
Start: 2019-02-11 | End: 2019-02-11 | Stop reason: HOSPADM

## 2019-02-11 RX ORDER — PROMETHAZINE HYDROCHLORIDE 25 MG/1
25 TABLET ORAL ONCE AS NEEDED
Status: DISCONTINUED | OUTPATIENT
Start: 2019-02-11 | End: 2019-02-11 | Stop reason: HOSPADM

## 2019-02-11 RX ORDER — NALOXONE HCL 0.4 MG/ML
0.2 VIAL (ML) INJECTION AS NEEDED
Status: DISCONTINUED | OUTPATIENT
Start: 2019-02-11 | End: 2019-02-11 | Stop reason: HOSPADM

## 2019-02-11 RX ORDER — ONDANSETRON 2 MG/ML
INJECTION INTRAMUSCULAR; INTRAVENOUS AS NEEDED
Status: DISCONTINUED | OUTPATIENT
Start: 2019-02-11 | End: 2019-02-11 | Stop reason: SURG

## 2019-02-11 RX ORDER — PROMETHAZINE HYDROCHLORIDE 25 MG/1
25 SUPPOSITORY RECTAL ONCE AS NEEDED
Status: DISCONTINUED | OUTPATIENT
Start: 2019-02-11 | End: 2019-02-11 | Stop reason: HOSPADM

## 2019-02-11 RX ADMIN — VALSARTAN 160 MG: 160 TABLET, FILM COATED ORAL at 10:55

## 2019-02-11 RX ADMIN — SODIUM CHLORIDE 125 ML/HR: 9 INJECTION, SOLUTION INTRAVENOUS at 15:15

## 2019-02-11 RX ADMIN — METOPROLOL TARTRATE 25 MG: 25 TABLET ORAL at 21:09

## 2019-02-11 RX ADMIN — PROPOFOL 200 MG: 10 INJECTION, EMULSION INTRAVENOUS at 13:24

## 2019-02-11 RX ADMIN — HYDROMORPHONE HYDROCHLORIDE 0.5 MG: 1 INJECTION, SOLUTION INTRAMUSCULAR; INTRAVENOUS; SUBCUTANEOUS at 11:16

## 2019-02-11 RX ADMIN — SODIUM CHLORIDE, PRESERVATIVE FREE 3 ML: 5 INJECTION INTRAVENOUS at 21:09

## 2019-02-11 RX ADMIN — FENTANYL CITRATE 50 MCG: 50 INJECTION INTRAMUSCULAR; INTRAVENOUS at 13:29

## 2019-02-11 RX ADMIN — METOPROLOL TARTRATE 25 MG: 25 TABLET ORAL at 08:23

## 2019-02-11 RX ADMIN — DEXAMETHASONE SODIUM PHOSPHATE 8 MG: 10 INJECTION INTRAMUSCULAR; INTRAVENOUS at 13:28

## 2019-02-11 RX ADMIN — FENTANYL CITRATE 50 MCG: 50 INJECTION INTRAMUSCULAR; INTRAVENOUS at 13:22

## 2019-02-11 RX ADMIN — ATORVASTATIN CALCIUM 40 MG: 20 TABLET, FILM COATED ORAL at 17:42

## 2019-02-11 RX ADMIN — FAMOTIDINE 20 MG: 10 INJECTION INTRAVENOUS at 13:03

## 2019-02-11 RX ADMIN — SODIUM CHLORIDE 125 ML/HR: 9 INJECTION, SOLUTION INTRAVENOUS at 06:07

## 2019-02-11 RX ADMIN — SODIUM CHLORIDE, POTASSIUM CHLORIDE, SODIUM LACTATE AND CALCIUM CHLORIDE 9 ML/HR: 600; 310; 30; 20 INJECTION, SOLUTION INTRAVENOUS at 13:03

## 2019-02-11 RX ADMIN — SODIUM CHLORIDE, PRESERVATIVE FREE 3 ML: 5 INJECTION INTRAVENOUS at 21:10

## 2019-02-11 RX ADMIN — SODIUM CHLORIDE, PRESERVATIVE FREE 3 ML: 5 INJECTION INTRAVENOUS at 08:23

## 2019-02-11 RX ADMIN — LIDOCAINE HYDROCHLORIDE 100 MG: 20 INJECTION, SOLUTION INFILTRATION; PERINEURAL at 13:24

## 2019-02-11 RX ADMIN — MIDAZOLAM 1 MG: 1 INJECTION INTRAMUSCULAR; INTRAVENOUS at 13:03

## 2019-02-11 RX ADMIN — MONTELUKAST SODIUM 10 MG: 10 TABLET, FILM COATED ORAL at 21:09

## 2019-02-11 RX ADMIN — ONDANSETRON 4 MG: 2 INJECTION INTRAMUSCULAR; INTRAVENOUS at 13:30

## 2019-02-11 RX ADMIN — DOCUSATE SODIUM 100 MG: 100 CAPSULE, LIQUID FILLED ORAL at 21:09

## 2019-02-11 RX ADMIN — HYDROMORPHONE HYDROCHLORIDE 0.5 MG: 1 INJECTION, SOLUTION INTRAMUSCULAR; INTRAVENOUS; SUBCUTANEOUS at 08:16

## 2019-02-11 RX ADMIN — HYDROCODONE BITARTRATE AND ACETAMINOPHEN 1 TABLET: 5; 325 TABLET ORAL at 17:42

## 2019-02-11 RX ADMIN — LEVOFLOXACIN 500 MG: 5 INJECTION, SOLUTION INTRAVENOUS at 14:59

## 2019-02-11 RX ADMIN — INSULIN LISPRO 2 UNITS: 100 INJECTION, SOLUTION INTRAVENOUS; SUBCUTANEOUS at 21:09

## 2019-02-11 RX ADMIN — SODIUM CHLORIDE, POTASSIUM CHLORIDE, SODIUM LACTATE AND CALCIUM CHLORIDE: 600; 310; 30; 20 INJECTION, SOLUTION INTRAVENOUS at 13:17

## 2019-02-11 NOTE — ANESTHESIA POSTPROCEDURE EVALUATION
Patient: Juan J Johnson    Procedure Summary     Date:  02/11/19 Room / Location:  [facility] / [facility]    Anesthesia Start:  1317 Anesthesia Stop:  1353    Procedure:  CYSTOSCOPY URETERAL CATHETER/STENT INSERTION (Left ) Diagnosis:      Surgeon:  Adonis Simpson MD Provider:  Jose Miguel Casillas MD    Anesthesia Type:  general ASA Status:  3          Anesthesia Type: general  Last vitals  BP   164/82 (02/11/19 1430)   Temp   36.6 °C (97.8 °F) (02/11/19 1430)   Pulse   92 (02/11/19 1430)   Resp   18 (02/11/19 1430)     SpO2   99 % (02/11/19 1430)     Post Anesthesia Care and Evaluation    Patient location during evaluation: bedside  Patient participation: complete - patient participated  Level of consciousness: awake  Pain management: adequate  Airway patency: patent  Anesthetic complications: No anesthetic complications    Cardiovascular status: acceptable  Respiratory status: acceptable  Hydration status: acceptable

## 2019-02-11 NOTE — ANESTHESIA PREPROCEDURE EVALUATION
Anesthesia Evaluation     Patient summary reviewed and Nursing notes reviewed                Airway   Mallampati: II  Dental          Pulmonary - normal exam    breath sounds clear to auscultation  (+) sleep apnea,   Cardiovascular - normal exam    (+) hypertension, hyperlipidemia,       Neuro/Psych- negative ROS  GI/Hepatic/Renal/Endo    (+)  GERD,  diabetes mellitus type 2,     Musculoskeletal     Abdominal    Substance History - negative use     OB/GYN          Other   (+) arthritis                   Anesthesia Plan    ASA 3     general     intravenous induction   Anesthetic plan, all risks, benefits, and alternatives have been provided, discussed and informed consent has been obtained with: patient.    Plan discussed with CRNA.

## 2019-02-11 NOTE — ANESTHESIA PROCEDURE NOTES
Airway  Urgency: elective    Date/Time: 2/11/2019 1:25 PM  Airway not difficult    General Information and Staff    Patient location during procedure: OR  Anesthesiologist: Jose Miguel Casillas MD  CRNA: Cindy Ricci CRNA    Indications and Patient Condition  Indications for airway management: airway protection    Preoxygenated: yes  MILS not maintained throughout  Mask difficulty assessment: 0 - not attempted    Final Airway Details  Final airway type: supraglottic airway      Successful airway: unique  Size 5    Number of attempts at approach: 1    Additional Comments  LMA inserted with ease, assist to SV

## 2019-02-11 NOTE — ANESTHESIA POSTPROCEDURE EVALUATION
"Patient: Juan J Johnson    Procedure Summary     Date:  02/11/19 Room / Location:  [facility] OR 01 / [facility] MAIN OR    Anesthesia Start:  1317 Anesthesia Stop:  1353    Procedure:  CYSTOSCOPY URETERAL CATHETER/STENT INSERTION (Left ) Diagnosis:      Surgeon:  Adonis Simpson MD Provider:  Jose Miguel Casillas MD    Anesthesia Type:  general ASA Status:  3          Anesthesia Type: general  Last vitals  BP   162/100 (02/11/19 1502)   Temp   36.3 °C (97.3 °F) (02/11/19 1502)   Pulse   97 (02/11/19 1502)   Resp   18 (02/11/19 1502)     SpO2   96 % (02/11/19 1502)     Post Anesthesia Care and Evaluation    Patient location during evaluation: bedside  Patient participation: complete - patient participated  Level of consciousness: awake and alert  Pain management: adequate  Airway patency: patent  Anesthetic complications: No anesthetic complications    Cardiovascular status: acceptable  Respiratory status: acceptable  Hydration status: acceptable    Comments: /100 (BP Location: Left arm, Patient Position: Lying)   Pulse 97   Temp 36.3 °C (97.3 °F) (Oral)   Resp 18   Ht 170.2 cm (67\")   Wt 92 kg (202 lb 14.4 oz)   SpO2 96%   BMI 31.78 kg/m²       "

## 2019-02-12 VITALS
DIASTOLIC BLOOD PRESSURE: 90 MMHG | WEIGHT: 202.9 LBS | BODY MASS INDEX: 31.84 KG/M2 | SYSTOLIC BLOOD PRESSURE: 176 MMHG | HEART RATE: 85 BPM | HEIGHT: 67 IN | TEMPERATURE: 96.9 F | RESPIRATION RATE: 16 BRPM | OXYGEN SATURATION: 97 %

## 2019-02-12 LAB
ANION GAP SERPL CALCULATED.3IONS-SCNC: 14.7 MMOL/L
BASOPHILS # BLD AUTO: 0.01 10*3/MM3 (ref 0–0.2)
BASOPHILS NFR BLD AUTO: 0.2 % (ref 0–1.5)
BUN BLD-MCNC: 15 MG/DL (ref 8–23)
BUN/CREAT SERPL: 16.7 (ref 7–25)
CALCIUM SPEC-SCNC: 9.3 MG/DL (ref 8.6–10.5)
CHLORIDE SERPL-SCNC: 105 MMOL/L (ref 98–107)
CO2 SERPL-SCNC: 22.3 MMOL/L (ref 22–29)
CREAT BLD-MCNC: 0.9 MG/DL (ref 0.76–1.27)
DEPRECATED RDW RBC AUTO: 43.9 FL (ref 37–54)
EOSINOPHIL # BLD AUTO: 0 10*3/MM3 (ref 0–0.4)
EOSINOPHIL NFR BLD AUTO: 0 % (ref 0.3–6.2)
ERYTHROCYTE [DISTWIDTH] IN BLOOD BY AUTOMATED COUNT: 13.2 % (ref 12.3–15.4)
GFR SERPL CREATININE-BSD FRML MDRD: 83 ML/MIN/1.73
GLUCOSE BLD-MCNC: 108 MG/DL (ref 65–99)
GLUCOSE BLDC GLUCOMTR-MCNC: 113 MG/DL (ref 70–130)
GLUCOSE BLDC GLUCOMTR-MCNC: 83 MG/DL (ref 70–130)
HCT VFR BLD AUTO: 37.8 % (ref 37.5–51)
HGB BLD-MCNC: 12.1 G/DL (ref 13–17.7)
IMM GRANULOCYTES # BLD AUTO: 0.04 10*3/MM3 (ref 0–0.05)
IMM GRANULOCYTES NFR BLD AUTO: 0.6 % (ref 0–0.5)
LYMPHOCYTES # BLD AUTO: 0.86 10*3/MM3 (ref 0.7–3.1)
LYMPHOCYTES NFR BLD AUTO: 13 % (ref 19.6–45.3)
MCH RBC QN AUTO: 29.4 PG (ref 26.6–33)
MCHC RBC AUTO-ENTMCNC: 32 G/DL (ref 31.5–35.7)
MCV RBC AUTO: 91.7 FL (ref 79–97)
MONOCYTES # BLD AUTO: 0.5 10*3/MM3 (ref 0.1–0.9)
MONOCYTES NFR BLD AUTO: 7.5 % (ref 5–12)
NEUTROPHILS # BLD AUTO: 5.22 10*3/MM3 (ref 1.4–7)
NEUTROPHILS NFR BLD AUTO: 78.7 % (ref 42.7–76)
NRBC BLD AUTO-RTO: 0 /100 WBC (ref 0–0)
PLATELET # BLD AUTO: 199 10*3/MM3 (ref 140–450)
PMV BLD AUTO: 10.7 FL (ref 6–12)
POTASSIUM BLD-SCNC: 4.6 MMOL/L (ref 3.5–5.2)
RBC # BLD AUTO: 4.12 10*6/MM3 (ref 4.14–5.8)
SODIUM BLD-SCNC: 142 MMOL/L (ref 136–145)
WBC NRBC COR # BLD: 6.63 10*3/MM3 (ref 3.4–10.8)

## 2019-02-12 PROCEDURE — 90732 PPSV23 VACC 2 YRS+ SUBQ/IM: CPT | Performed by: SURGERY

## 2019-02-12 PROCEDURE — 82962 GLUCOSE BLOOD TEST: CPT

## 2019-02-12 PROCEDURE — 90734 MENACWYD/MENACWYCRM VACC IM: CPT | Performed by: SURGERY

## 2019-02-12 PROCEDURE — G0008 ADMIN INFLUENZA VIRUS VAC: HCPCS | Performed by: SURGERY

## 2019-02-12 PROCEDURE — 25010000002 PNEUMOCOCCAL VAC POLYVALENT PER 0.5 ML: Performed by: SURGERY

## 2019-02-12 PROCEDURE — 25010000002 MENINGOCOCCAL RECONSTITUTED SOLUTION: Performed by: SURGERY

## 2019-02-12 PROCEDURE — 85025 COMPLETE CBC W/AUTO DIFF WBC: CPT | Performed by: INTERNAL MEDICINE

## 2019-02-12 PROCEDURE — 80048 BASIC METABOLIC PNL TOTAL CA: CPT | Performed by: INTERNAL MEDICINE

## 2019-02-12 PROCEDURE — G0009 ADMIN PNEUMOCOCCAL VACCINE: HCPCS | Performed by: SURGERY

## 2019-02-12 PROCEDURE — 25010000002 LEVOFLOXACIN PER 250 MG: Performed by: UROLOGY

## 2019-02-12 RX ADMIN — PANTOPRAZOLE SODIUM 40 MG: 40 TABLET, DELAYED RELEASE ORAL at 06:56

## 2019-02-12 RX ADMIN — VALSARTAN 160 MG: 160 TABLET, FILM COATED ORAL at 08:57

## 2019-02-12 RX ADMIN — DOCUSATE SODIUM 100 MG: 100 CAPSULE, LIQUID FILLED ORAL at 08:57

## 2019-02-12 RX ADMIN — HAEMOPHILUS B POLYSACCHARIDE CONJUGATE VACCINE FOR INJ 0.5 ML: RECON SOLN at 12:56

## 2019-02-12 RX ADMIN — METFORMIN HYDROCHLORIDE 1000 MG: 1000 TABLET ORAL at 12:44

## 2019-02-12 RX ADMIN — LEVOFLOXACIN 500 MG: 5 INJECTION, SOLUTION INTRAVENOUS at 14:32

## 2019-02-12 RX ADMIN — MENINGOCOCCAL (GROUPS A, C, Y AND W-135) OLIGOSACCHARIDE DIPHTHERIA CRM197 CONJUGATE VACCINE 0.5 ML: KIT at 12:46

## 2019-02-12 RX ADMIN — METOPROLOL TARTRATE 25 MG: 25 TABLET ORAL at 08:58

## 2019-02-12 RX ADMIN — PNEUMOCOCCAL VACCINE POLYVALENT 0.5 ML
25; 25; 25; 25; 25; 25; 25; 25; 25; 25; 25; 25; 25; 25; 25; 25; 25; 25; 25; 25; 25; 25; 25 INJECTION, SOLUTION INTRAMUSCULAR; SUBCUTANEOUS at 12:53

## 2019-02-12 RX ADMIN — SODIUM CHLORIDE 50 ML/HR: 9 INJECTION, SOLUTION INTRAVENOUS at 05:56

## 2019-02-13 ENCOUNTER — READMISSION MANAGEMENT (OUTPATIENT)
Dept: CALL CENTER | Facility: HOSPITAL | Age: 74
End: 2019-02-13

## 2019-02-13 ENCOUNTER — PREP FOR SURGERY (OUTPATIENT)
Dept: OTHER | Facility: HOSPITAL | Age: 74
End: 2019-02-13

## 2019-02-13 DIAGNOSIS — K86.89 PANCREATIC MASS: Primary | ICD-10-CM

## 2019-02-13 RX ORDER — CEFAZOLIN SODIUM 2 G/100ML
2 INJECTION, SOLUTION INTRAVENOUS ONCE
Status: CANCELLED | OUTPATIENT
Start: 2019-03-15 | End: 2019-02-13

## 2019-02-13 NOTE — OUTREACH NOTE
Prep Survey      Responses   Facility patient discharged from?  [facility]   Is patient eligible?  Yes   Discharge diagnosis  Ureterolithiasis:  s/p cystoscopy w stent.   Does the patient have one of the following disease processes/diagnoses(primary or secondary)?  General Surgery   Does the patient have Home health ordered?  No   Is there a DME ordered?  No   Prep survey completed?  Yes          Iliana Chaudhry RN

## 2019-02-14 ENCOUNTER — LAB (OUTPATIENT)
Dept: LAB | Facility: HOSPITAL | Age: 74
End: 2019-02-14

## 2019-02-14 DIAGNOSIS — N17.9 AKI (ACUTE KIDNEY INJURY) (HCC): ICD-10-CM

## 2019-02-14 LAB
ANION GAP SERPL CALCULATED.3IONS-SCNC: 13 MMOL/L
BUN BLD-MCNC: 12 MG/DL (ref 8–23)
BUN/CREAT SERPL: 12.5 (ref 7–25)
CALCIUM SPEC-SCNC: 9.3 MG/DL (ref 8.6–10.5)
CHLORIDE SERPL-SCNC: 107 MMOL/L (ref 98–107)
CO2 SERPL-SCNC: 26 MMOL/L (ref 22–29)
CREAT BLD-MCNC: 0.96 MG/DL (ref 0.76–1.27)
GFR SERPL CREATININE-BSD FRML MDRD: 77 ML/MIN/1.73
GLUCOSE BLD-MCNC: 63 MG/DL (ref 65–99)
POTASSIUM BLD-SCNC: 4 MMOL/L (ref 3.5–5.2)
SODIUM BLD-SCNC: 146 MMOL/L (ref 136–145)

## 2019-02-14 PROCEDURE — 80048 BASIC METABOLIC PNL TOTAL CA: CPT

## 2019-02-14 PROCEDURE — 36415 COLL VENOUS BLD VENIPUNCTURE: CPT

## 2019-02-16 ENCOUNTER — READMISSION MANAGEMENT (OUTPATIENT)
Dept: CALL CENTER | Facility: HOSPITAL | Age: 74
End: 2019-02-16

## 2019-02-16 NOTE — OUTREACH NOTE
General Surgery Week 1 Survey      Responses   Facility patient discharged from?  [facility]   Does the patient have one of the following disease processes/diagnoses(primary or secondary)?  General Surgery   Is there a successful TCM telephone encounter documented?  No   Week 1 attempt successful?  Yes   Call start time  0952   Call end time  1001   Discharge diagnosis  Ureterolithiasis:  s/p cystoscopy w stent.   Meds reviewed with patient/caregiver?  Yes   Is the patient having any side effects they believe may be caused by any medication additions or changes?  No   Does the patient have all medications related to this admission filled (includes all antibiotics, pain medications, etc.)  N/A   Is the patient taking all medications as directed (includes completed medication regime)?  N/A   Does the patient have a follow up appointment scheduled with their surgeon?  Yes   Has the patient kept scheduled appointments due by today?  Yes   Comments  Stent has already been removed.    Has home health visited the patient within 72 hours of discharge?  N/A   Psychosocial issues?  No   Comments  Stent has already been removed. Patient is voiding well. No complaints of pain. Has upcoming surgery scheduled on 03/15 for removal of pancreatic mass? / possibly spleenectomy   Did the patient receive a copy of their discharge instructions?  Yes   Nursing interventions  Reviewed instructions with patient   What is the patient's perception of their health status since discharge?  Improving   Nursing interventions  Nurse provided patient education   Is the patient /caregiver able to teach back basic post-op care?  Lifting as instructed by MD in discharge instructions, Drive as instructed by MD in discharge instructions, Practice 'cough and deep breath', Continue use of incentive spirometry at least 1 week post discharge, Take showers only when approved by MD-sponge bathe until then   Is the patient/caregiver able to teach back signs  and symptoms of incisional infection?  Fever   Is the patient/caregiver able to teach back steps to recovery at home?  Set small, achievable goals for return to baseline health, Rest and rebuild strength, gradually increase activity, Make a list of questions for surgeon's appointment   Is the patient/caregiver able to teach back the hierarchy of who to call/visit for symptoms/problems? PCP, Specialist, Home health nurse, Urgent Care, ED, 911  Yes   Week 1 call completed?  Yes          Gordon Rice RN

## 2019-02-25 ENCOUNTER — READMISSION MANAGEMENT (OUTPATIENT)
Dept: CALL CENTER | Facility: HOSPITAL | Age: 74
End: 2019-02-25

## 2019-02-25 ENCOUNTER — TELEPHONE (OUTPATIENT)
Dept: SLEEP MEDICINE | Facility: HOSPITAL | Age: 74
End: 2019-02-25

## 2019-02-25 ENCOUNTER — TRANSCRIBE ORDERS (OUTPATIENT)
Dept: SLEEP MEDICINE | Facility: HOSPITAL | Age: 74
End: 2019-02-25

## 2019-02-25 DIAGNOSIS — G47.33 OSA (OBSTRUCTIVE SLEEP APNEA): Primary | ICD-10-CM

## 2019-02-25 DIAGNOSIS — G47.10 HYPERSOMNIA: ICD-10-CM

## 2019-02-25 NOTE — TELEPHONE ENCOUNTER
Vincent with Patient about in lab sleep study results and Dr. Villegas's recommendation for in lab CPAP titration.  Pt scheduled PAP Titration for Thursday 4/4/19 at 1930.  Dr. Villegas was updated about pt's preference for further testing.  Chart given to  for pre-certification. rich

## 2019-02-25 NOTE — OUTREACH NOTE
General Surgery Week 2 Survey      Responses   Facility patient discharged from?  [facility]   Does the patient have one of the following disease processes/diagnoses(primary or secondary)?  General Surgery   Week 2 attempt successful?  Yes   Call start time  1435   Call end time  1438   Person spoke with today (if not patient) and relationship  Spouse Destiny Puri reviewed with patient/caregiver?  Yes   Is the patient taking all medications as directed (includes completed medication regime)?  Yes   Has the patient kept scheduled appointments due by today?  Yes   Psychosocial issues?  No   What is the patient's perception of their health status since discharge?  Improving   Nursing interventions  Nurse provided patient education   Is the patient /caregiver able to teach back basic post-op care?  Lifting as instructed by MD in discharge instructions, Drive as instructed by MD in discharge instructions, Practice 'cough and deep breath', Continue use of incentive spirometry at least 1 week post discharge, Take showers only when approved by MD-sponge bathe until then   Is the patient/caregiver able to teach back steps to recovery at home?  Set small, achievable goals for return to baseline health, Rest and rebuild strength, gradually increase activity, Make a list of questions for surgeon's appointment   Week 2 call completed?  Yes          La Lund RN

## 2019-02-28 ENCOUNTER — HOSPITAL ENCOUNTER (OUTPATIENT)
Dept: GENERAL RADIOLOGY | Facility: HOSPITAL | Age: 74
Discharge: HOME | End: 2019-02-28
Admitting: UROLOGY

## 2019-02-28 DIAGNOSIS — N20.0 CALCULUS, RENAL: ICD-10-CM

## 2019-02-28 PROCEDURE — 74018 RADEX ABDOMEN 1 VIEW: CPT

## 2019-03-08 ENCOUNTER — APPOINTMENT (OUTPATIENT)
Dept: PREADMISSION TESTING | Facility: HOSPITAL | Age: 74
End: 2019-03-08

## 2019-03-08 VITALS
OXYGEN SATURATION: 96 % | TEMPERATURE: 97.9 F | WEIGHT: 200 LBS | RESPIRATION RATE: 20 BRPM | HEART RATE: 82 BPM | SYSTOLIC BLOOD PRESSURE: 137 MMHG | DIASTOLIC BLOOD PRESSURE: 81 MMHG | BODY MASS INDEX: 31.39 KG/M2 | HEIGHT: 67 IN

## 2019-03-08 LAB
ANION GAP SERPL CALCULATED.3IONS-SCNC: 13.6 MMOL/L
BUN BLD-MCNC: 12 MG/DL (ref 8–23)
BUN/CREAT SERPL: 13.3 (ref 7–25)
CALCIUM SPEC-SCNC: 9.7 MG/DL (ref 8.6–10.5)
CHLORIDE SERPL-SCNC: 103 MMOL/L (ref 98–107)
CO2 SERPL-SCNC: 25.4 MMOL/L (ref 22–29)
CREAT BLD-MCNC: 0.9 MG/DL (ref 0.76–1.27)
DEPRECATED RDW RBC AUTO: 45.9 FL (ref 37–54)
ERYTHROCYTE [DISTWIDTH] IN BLOOD BY AUTOMATED COUNT: 13.6 % (ref 12.3–15.4)
GFR SERPL CREATININE-BSD FRML MDRD: 83 ML/MIN/1.73
GLUCOSE BLD-MCNC: 119 MG/DL (ref 65–99)
HCT VFR BLD AUTO: 38.3 % (ref 37.5–51)
HGB BLD-MCNC: 12.3 G/DL (ref 13–17.7)
MCH RBC QN AUTO: 29.9 PG (ref 26.6–33)
MCHC RBC AUTO-ENTMCNC: 32.1 G/DL (ref 31.5–35.7)
MCV RBC AUTO: 93.2 FL (ref 79–97)
PLATELET # BLD AUTO: 245 10*3/MM3 (ref 140–450)
PMV BLD AUTO: 10.9 FL (ref 6–12)
POTASSIUM BLD-SCNC: 4.3 MMOL/L (ref 3.5–5.2)
RBC # BLD AUTO: 4.11 10*6/MM3 (ref 4.14–5.8)
SODIUM BLD-SCNC: 142 MMOL/L (ref 136–145)
WBC NRBC COR # BLD: 4.96 10*3/MM3 (ref 3.4–10.8)

## 2019-03-08 PROCEDURE — 80048 BASIC METABOLIC PNL TOTAL CA: CPT | Performed by: SURGERY

## 2019-03-08 PROCEDURE — 36415 COLL VENOUS BLD VENIPUNCTURE: CPT

## 2019-03-08 PROCEDURE — 85027 COMPLETE CBC AUTOMATED: CPT | Performed by: SURGERY

## 2019-03-08 RX ORDER — TAMSULOSIN HYDROCHLORIDE 0.4 MG/1
1 CAPSULE ORAL NIGHTLY
COMMUNITY
End: 2019-08-13

## 2019-03-08 NOTE — DISCHARGE INSTRUCTIONS
Take the following medications the morning of surgery with a small sip of water:  OMEPRAZOLE, METOPROLOL, AND VALSARTAN/ HCTZ    ARRIVAL TIME 1030 TO MAIN OR         General Instructions:  • Do not eat solid food after midnight the night before surgery.  • You may drink clear liquids day of surgery but must stop at least one hour before your hospital arrival time (CUOTFF TIME 0930 AM)  • It is beneficial for you to have a clear drink that contains carbohydrates the day of surgery.  We suggest a 12 to 20 ounce bottle of Gatorade or Powerade for non-diabetic patients or a 12 to 20 ounce bottle of G2 or Powerade Zero for diabetic patients. (Pediatric patients, are not advised to drink a 12 to 20 ounce carbohydrate drink)    Clear liquids are liquids you can see through.  Nothing red in color.     Plain water                               Sports drinks  Sodas                                   Gelatin (Jell-O)  Fruit juices without pulp such as white grape juice and apple juice  Popsicles that contain no fruit or yogurt  Tea or coffee (no cream or milk added)  Gatorade / Powerade  G2 / Powerade Zero    • Infants may have breast milk up to four hours before surgery.  • Infants drinking formula may drink formula up to six hours before surgery.   • Patients who avoid smoking, chewing tobacco and alcohol for 4 weeks prior to surgery have a reduced risk of post-operative complications.  Quit smoking as many days before surgery as you can.  • Do not smoke, use chewing tobacco or drink alcohol the day of surgery.   • If applicable bring your C-PAP/ BI-PAP machine.  • Bring any papers given to you in the doctor’s office.  • Wear clean comfortable clothes and socks.  • Do not wear contact lenses or make-up.  Bring a case for your glasses.   • Bring crutches or walker if applicable.  • Remove all piercings.  Leave jewelry and any other valuables at home.  • Hair extensions with metal clips must be removed prior to surgery.  • The  Pre-Admission Testing nurse will instruct you to bring medications if unable to obtain an accurate list in Pre-Admission Testing.            Preventing a Surgical Site Infection:  • For 2 to 3 days before surgery, avoid shaving with a razor because the razor can irritate skin and make it easier to develop an infection.    • Any areas of open skin can increase the risk of a post-operative wound infection by allowing bacteria to enter and travel throughout the body.  Notify your surgeon if you have any skin wounds / rashes even if it is not near the expected surgical site.  The area will need assessed to determine if surgery should be delayed until it is healed.  • The night prior to surgery sleep in a clean bed with clean clothing.  Do not allow pets to sleep with you.  • Shower on the morning of surgery using a fresh bar of anti-bacterial soap (such as Dial) and clean washcloth.  Dry with a clean towel and dress in clean clothing.  • Ask your surgeon if you will be receiving antibiotics prior to surgery.  • Make sure you, your family, and all healthcare providers clean their hands with soap and water or an alcohol based hand  before caring for you or your wound.    Day of surgery:  Upon arrival, a Pre-op nurse and Anesthesiologist will review your health history, obtain vital signs, and answer questions you may have.  The only belongings needed at this time will be your home medications and if applicable your C-PAP/BI-PAP machine.  If you are staying overnight your family can leave the rest of your belongings in the car and bring them to your room later.  A Pre-op nurse will start an IV and you may receive medication in preparation for surgery, including something to help you relax.  Your family will be able to see you in the Pre-op area.  While you are in surgery your family should notify the waiting room  if they leave the waiting room area and provide a contact phone number.    Please be  aware that surgery does come with discomfort.  We want to make every effort to control your discomfort so please discuss any uncontrolled symptoms with your nurse.   Your doctor will most likely have prescribed pain medications.      If you are going home after surgery you will receive individualized written care instructions before being discharged.  A responsible adult must drive you to and from the hospital on the day of your surgery and stay with you for 24 hours.    If you are staying overnight following surgery, you will be transported to your hospital room following the recovery period.  [facility] has all private rooms.    You have received a list of surgical assistants for your reference.  If you have any questions please call Pre-Admission Testing at [phone].  Deductibles and co-payments are collected on the day of service. Please be prepared to pay the required co-pay, deductible or deposit on the day of service as defined by your plan.

## 2019-03-14 ENCOUNTER — ANESTHESIA EVENT (OUTPATIENT)
Dept: PERIOP | Facility: HOSPITAL | Age: 74
End: 2019-03-14

## 2019-03-15 ENCOUNTER — ANESTHESIA (OUTPATIENT)
Dept: PERIOP | Facility: HOSPITAL | Age: 74
End: 2019-03-15

## 2019-03-15 ENCOUNTER — HOSPITAL ENCOUNTER (INPATIENT)
Facility: HOSPITAL | Age: 74
LOS: 3 days | Discharge: HOME | End: 2019-03-18
Attending: SURGERY | Admitting: SURGERY

## 2019-03-15 DIAGNOSIS — K86.89 PANCREATIC MASS: Primary | ICD-10-CM

## 2019-03-15 LAB
ABO GROUP BLD: NORMAL
BLD GP AB SCN SERPL QL: NEGATIVE
GLUCOSE BLDC GLUCOMTR-MCNC: 101 MG/DL (ref 70–130)
GLUCOSE BLDC GLUCOMTR-MCNC: 152 MG/DL (ref 70–130)
GLUCOSE BLDC GLUCOMTR-MCNC: 181 MG/DL (ref 70–130)
RH BLD: POSITIVE
T&S EXPIRATION DATE: NORMAL

## 2019-03-15 PROCEDURE — 25010000002 HYDROMORPHONE PER 4 MG: Performed by: NURSE ANESTHETIST, CERTIFIED REGISTERED

## 2019-03-15 PROCEDURE — 25010000002 ONDANSETRON PER 1 MG: Performed by: NURSE ANESTHETIST, CERTIFIED REGISTERED

## 2019-03-15 PROCEDURE — 86901 BLOOD TYPING SEROLOGIC RH(D): CPT | Performed by: SURGERY

## 2019-03-15 PROCEDURE — 25010000002 MIDAZOLAM PER 1 MG: Performed by: ANESTHESIOLOGY

## 2019-03-15 PROCEDURE — 25010000002 MAGNESIUM SULFATE PER 500 MG OF MAGNESIUM: Performed by: NURSE ANESTHETIST, CERTIFIED REGISTERED

## 2019-03-15 PROCEDURE — 25010000003 CEFAZOLIN 1-4 GM/50ML-% SOLUTION: Performed by: SURGERY

## 2019-03-15 PROCEDURE — 48999 UNLISTED PROCEDURE PANCREAS: CPT | Performed by: SURGERY

## 2019-03-15 PROCEDURE — 25010000002 SUCCINYLCHOLINE PER 20 MG: Performed by: NURSE ANESTHETIST, CERTIFIED REGISTERED

## 2019-03-15 PROCEDURE — 25010000003 CEFAZOLIN IN DEXTROSE 2-4 GM/100ML-% SOLUTION: Performed by: SURGERY

## 2019-03-15 PROCEDURE — 25010000002 PROPOFOL 10 MG/ML EMULSION: Performed by: NURSE ANESTHETIST, CERTIFIED REGISTERED

## 2019-03-15 PROCEDURE — 25010000002 FENTANYL CITRATE (PF) 100 MCG/2ML SOLUTION: Performed by: NURSE ANESTHETIST, CERTIFIED REGISTERED

## 2019-03-15 PROCEDURE — 0FBG4ZZ EXCISION OF PANCREAS, PERCUTANEOUS ENDOSCOPIC APPROACH: ICD-10-PCS | Performed by: SURGERY

## 2019-03-15 PROCEDURE — 25010000002 HYDROMORPHONE PER 4 MG: Performed by: SURGERY

## 2019-03-15 PROCEDURE — 25010000002 FENTANYL CITRATE (PF) 100 MCG/2ML SOLUTION: Performed by: ANESTHESIOLOGY

## 2019-03-15 PROCEDURE — 82962 GLUCOSE BLOOD TEST: CPT

## 2019-03-15 PROCEDURE — 25010000002 KETOROLAC TROMETHAMINE PER 15 MG: Performed by: SURGERY

## 2019-03-15 PROCEDURE — 25010000002 NEOSTIGMINE PER 0.5 MG: Performed by: NURSE ANESTHETIST, CERTIFIED REGISTERED

## 2019-03-15 PROCEDURE — 88309 TISSUE EXAM BY PATHOLOGIST: CPT | Performed by: SURGERY

## 2019-03-15 PROCEDURE — 07TP4ZZ RESECTION OF SPLEEN, PERCUTANEOUS ENDOSCOPIC APPROACH: ICD-10-PCS | Performed by: SURGERY

## 2019-03-15 PROCEDURE — P9041 ALBUMIN (HUMAN),5%, 50ML: HCPCS | Performed by: NURSE ANESTHETIST, CERTIFIED REGISTERED

## 2019-03-15 PROCEDURE — 86900 BLOOD TYPING SEROLOGIC ABO: CPT | Performed by: SURGERY

## 2019-03-15 PROCEDURE — 25010000002 PHENYLEPHRINE PER 1 ML: Performed by: NURSE ANESTHETIST, CERTIFIED REGISTERED

## 2019-03-15 PROCEDURE — 88305 TISSUE EXAM BY PATHOLOGIST: CPT | Performed by: SURGERY

## 2019-03-15 PROCEDURE — 25010000002 ALBUMIN HUMAN 5% PER 50 ML: Performed by: NURSE ANESTHETIST, CERTIFIED REGISTERED

## 2019-03-15 PROCEDURE — 63710000001 INSULIN LISPRO (HUMAN) PER 5 UNITS: Performed by: SURGERY

## 2019-03-15 PROCEDURE — 86850 RBC ANTIBODY SCREEN: CPT | Performed by: SURGERY

## 2019-03-15 PROCEDURE — 25010000002 DEXAMETHASONE PER 1 MG: Performed by: NURSE ANESTHETIST, CERTIFIED REGISTERED

## 2019-03-15 DEVICE — CLIP LIGAT VASC HORIZON TI MD/LG GRN 6CT: Type: IMPLANTABLE DEVICE | Site: ABDOMEN | Status: FUNCTIONAL

## 2019-03-15 DEVICE — SEALANT WND FIBRIN TISSEEL VAPOR/HEAT/PREFIL/SYR 10ML: Type: IMPLANTABLE DEVICE | Site: ABDOMEN | Status: FUNCTIONAL

## 2019-03-15 DEVICE — ENDOPATH ECHELON ENDOSCOPIC LINEAR CUTTER RELOADS, WHITE, 60MM
Type: IMPLANTABLE DEVICE | Site: ABDOMEN | Status: FUNCTIONAL
Brand: ECHELON ENDOPATH

## 2019-03-15 RX ORDER — FENTANYL CITRATE 50 UG/ML
INJECTION, SOLUTION INTRAMUSCULAR; INTRAVENOUS AS NEEDED
Status: DISCONTINUED | OUTPATIENT
Start: 2019-03-15 | End: 2019-03-15 | Stop reason: SURG

## 2019-03-15 RX ORDER — PROMETHAZINE HYDROCHLORIDE 25 MG/1
25 TABLET ORAL ONCE AS NEEDED
Status: DISCONTINUED | OUTPATIENT
Start: 2019-03-15 | End: 2019-03-15 | Stop reason: HOSPADM

## 2019-03-15 RX ORDER — CEFAZOLIN SODIUM 1 G/50ML
1 INJECTION, SOLUTION INTRAVENOUS EVERY 8 HOURS
Status: COMPLETED | OUTPATIENT
Start: 2019-03-15 | End: 2019-03-16

## 2019-03-15 RX ORDER — PROMETHAZINE HYDROCHLORIDE 25 MG/1
25 SUPPOSITORY RECTAL ONCE AS NEEDED
Status: DISCONTINUED | OUTPATIENT
Start: 2019-03-15 | End: 2019-03-15 | Stop reason: HOSPADM

## 2019-03-15 RX ORDER — FENTANYL CITRATE 50 UG/ML
50 INJECTION, SOLUTION INTRAMUSCULAR; INTRAVENOUS
Status: DISCONTINUED | OUTPATIENT
Start: 2019-03-15 | End: 2019-03-15 | Stop reason: HOSPADM

## 2019-03-15 RX ORDER — OXYCODONE AND ACETAMINOPHEN 10; 325 MG/1; MG/1
1 TABLET ORAL EVERY 4 HOURS PRN
Status: DISCONTINUED | OUTPATIENT
Start: 2019-03-15 | End: 2019-03-18 | Stop reason: HOSPADM

## 2019-03-15 RX ORDER — PANTOPRAZOLE SODIUM 40 MG/1
40 TABLET, DELAYED RELEASE ORAL EVERY MORNING
Status: DISCONTINUED | OUTPATIENT
Start: 2019-03-16 | End: 2019-03-18 | Stop reason: HOSPADM

## 2019-03-15 RX ORDER — LIDOCAINE HYDROCHLORIDE 10 MG/ML
0.5 INJECTION, SOLUTION EPIDURAL; INFILTRATION; INTRACAUDAL; PERINEURAL ONCE AS NEEDED
Status: DISCONTINUED | OUTPATIENT
Start: 2019-03-15 | End: 2019-03-15 | Stop reason: HOSPADM

## 2019-03-15 RX ORDER — LIDOCAINE HYDROCHLORIDE 20 MG/ML
INJECTION, SOLUTION INFILTRATION; PERINEURAL AS NEEDED
Status: DISCONTINUED | OUTPATIENT
Start: 2019-03-15 | End: 2019-03-15 | Stop reason: SURG

## 2019-03-15 RX ORDER — KETOROLAC TROMETHAMINE 15 MG/ML
15 INJECTION, SOLUTION INTRAMUSCULAR; INTRAVENOUS EVERY 6 HOURS
Status: DISCONTINUED | OUTPATIENT
Start: 2019-03-15 | End: 2019-03-18 | Stop reason: HOSPADM

## 2019-03-15 RX ORDER — ATORVASTATIN CALCIUM 20 MG/1
40 TABLET, FILM COATED ORAL EVERY EVENING
Status: DISCONTINUED | OUTPATIENT
Start: 2019-03-15 | End: 2019-03-18 | Stop reason: HOSPADM

## 2019-03-15 RX ORDER — HYDROMORPHONE HYDROCHLORIDE 1 MG/ML
0.5 INJECTION, SOLUTION INTRAMUSCULAR; INTRAVENOUS; SUBCUTANEOUS
Status: DISCONTINUED | OUTPATIENT
Start: 2019-03-15 | End: 2019-03-15 | Stop reason: HOSPADM

## 2019-03-15 RX ORDER — SODIUM CHLORIDE 0.9 % (FLUSH) 0.9 %
1-10 SYRINGE (ML) INJECTION AS NEEDED
Status: DISCONTINUED | OUTPATIENT
Start: 2019-03-15 | End: 2019-03-15 | Stop reason: HOSPADM

## 2019-03-15 RX ORDER — NICOTINE POLACRILEX 4 MG
15 LOZENGE BUCCAL
Status: DISCONTINUED | OUTPATIENT
Start: 2019-03-15 | End: 2019-03-18 | Stop reason: HOSPADM

## 2019-03-15 RX ORDER — HYDROCODONE BITARTRATE AND ACETAMINOPHEN 7.5; 325 MG/1; MG/1
1 TABLET ORAL ONCE AS NEEDED
Status: DISCONTINUED | OUTPATIENT
Start: 2019-03-15 | End: 2019-03-15 | Stop reason: HOSPADM

## 2019-03-15 RX ORDER — NALOXONE HCL 0.4 MG/ML
0.1 VIAL (ML) INJECTION
Status: DISCONTINUED | OUTPATIENT
Start: 2019-03-15 | End: 2019-03-18 | Stop reason: HOSPADM

## 2019-03-15 RX ORDER — LOSARTAN POTASSIUM AND HYDROCHLOROTHIAZIDE 25; 100 MG/1; MG/1
1 TABLET ORAL DAILY
COMMUNITY
End: 2020-04-09 | Stop reason: HOSPADM

## 2019-03-15 RX ORDER — MAGNESIUM SULFATE HEPTAHYDRATE 500 MG/ML
INJECTION, SOLUTION INTRAMUSCULAR; INTRAVENOUS AS NEEDED
Status: DISCONTINUED | OUTPATIENT
Start: 2019-03-15 | End: 2019-03-15 | Stop reason: SURG

## 2019-03-15 RX ORDER — WOUND DRESSING ADHESIVE - LIQUID
LIQUID MISCELLANEOUS AS NEEDED
Status: DISCONTINUED | OUTPATIENT
Start: 2019-03-15 | End: 2019-03-15 | Stop reason: HOSPADM

## 2019-03-15 RX ORDER — MAGNESIUM HYDROXIDE 1200 MG/15ML
LIQUID ORAL AS NEEDED
Status: DISCONTINUED | OUTPATIENT
Start: 2019-03-15 | End: 2019-03-15 | Stop reason: HOSPADM

## 2019-03-15 RX ORDER — MIDAZOLAM HYDROCHLORIDE 1 MG/ML
2 INJECTION INTRAMUSCULAR; INTRAVENOUS
Status: DISCONTINUED | OUTPATIENT
Start: 2019-03-15 | End: 2019-03-15 | Stop reason: HOSPADM

## 2019-03-15 RX ORDER — PROMETHAZINE HYDROCHLORIDE 25 MG/ML
5 INJECTION, SOLUTION INTRAMUSCULAR; INTRAVENOUS 3 TIMES DAILY PRN
Status: DISCONTINUED | OUTPATIENT
Start: 2019-03-15 | End: 2019-03-15 | Stop reason: HOSPADM

## 2019-03-15 RX ORDER — MEPERIDINE HYDROCHLORIDE 25 MG/ML
12.5 INJECTION INTRAMUSCULAR; INTRAVENOUS; SUBCUTANEOUS
Status: DISCONTINUED | OUTPATIENT
Start: 2019-03-15 | End: 2019-03-15 | Stop reason: HOSPADM

## 2019-03-15 RX ORDER — PROMETHAZINE HYDROCHLORIDE 25 MG/ML
12.5 INJECTION, SOLUTION INTRAMUSCULAR; INTRAVENOUS ONCE AS NEEDED
Status: DISCONTINUED | OUTPATIENT
Start: 2019-03-15 | End: 2019-03-15 | Stop reason: HOSPADM

## 2019-03-15 RX ORDER — CEFAZOLIN SODIUM 2 G/100ML
2 INJECTION, SOLUTION INTRAVENOUS ONCE
Status: COMPLETED | OUTPATIENT
Start: 2019-03-15 | End: 2019-03-15

## 2019-03-15 RX ORDER — LIDOCAINE HYDROCHLORIDE 40 MG/ML
SOLUTION TOPICAL AS NEEDED
Status: DISCONTINUED | OUTPATIENT
Start: 2019-03-15 | End: 2019-03-15 | Stop reason: SURG

## 2019-03-15 RX ORDER — SODIUM CHLORIDE 9 MG/ML
INJECTION, SOLUTION INTRAVENOUS AS NEEDED
Status: DISCONTINUED | OUTPATIENT
Start: 2019-03-15 | End: 2019-03-15 | Stop reason: HOSPADM

## 2019-03-15 RX ORDER — HYDRALAZINE HYDROCHLORIDE 20 MG/ML
5 INJECTION INTRAMUSCULAR; INTRAVENOUS
Status: DISCONTINUED | OUTPATIENT
Start: 2019-03-15 | End: 2019-03-15 | Stop reason: HOSPADM

## 2019-03-15 RX ORDER — TAMSULOSIN HYDROCHLORIDE 0.4 MG/1
0.4 CAPSULE ORAL NIGHTLY
Status: DISCONTINUED | OUTPATIENT
Start: 2019-03-15 | End: 2019-03-18 | Stop reason: HOSPADM

## 2019-03-15 RX ORDER — SODIUM CHLORIDE, SODIUM LACTATE, POTASSIUM CHLORIDE, CALCIUM CHLORIDE 600; 310; 30; 20 MG/100ML; MG/100ML; MG/100ML; MG/100ML
100 INJECTION, SOLUTION INTRAVENOUS CONTINUOUS
Status: DISCONTINUED | OUTPATIENT
Start: 2019-03-15 | End: 2019-03-16

## 2019-03-15 RX ORDER — MONTELUKAST SODIUM 10 MG/1
10 TABLET ORAL NIGHTLY
Status: DISCONTINUED | OUTPATIENT
Start: 2019-03-15 | End: 2019-03-18 | Stop reason: HOSPADM

## 2019-03-15 RX ORDER — DEXTROSE MONOHYDRATE 25 G/50ML
25 INJECTION, SOLUTION INTRAVENOUS
Status: DISCONTINUED | OUTPATIENT
Start: 2019-03-15 | End: 2019-03-18 | Stop reason: HOSPADM

## 2019-03-15 RX ORDER — IPRATROPIUM BROMIDE AND ALBUTEROL SULFATE 2.5; .5 MG/3ML; MG/3ML
3 SOLUTION RESPIRATORY (INHALATION) ONCE AS NEEDED
Status: DISCONTINUED | OUTPATIENT
Start: 2019-03-15 | End: 2019-03-15 | Stop reason: HOSPADM

## 2019-03-15 RX ORDER — FLUMAZENIL 0.1 MG/ML
0.2 INJECTION INTRAVENOUS AS NEEDED
Status: DISCONTINUED | OUTPATIENT
Start: 2019-03-15 | End: 2019-03-15 | Stop reason: HOSPADM

## 2019-03-15 RX ORDER — ONDANSETRON 2 MG/ML
4 INJECTION INTRAMUSCULAR; INTRAVENOUS EVERY 6 HOURS PRN
Status: DISCONTINUED | OUTPATIENT
Start: 2019-03-15 | End: 2019-03-18 | Stop reason: HOSPADM

## 2019-03-15 RX ORDER — DEXAMETHASONE SODIUM PHOSPHATE 10 MG/ML
INJECTION INTRAMUSCULAR; INTRAVENOUS AS NEEDED
Status: DISCONTINUED | OUTPATIENT
Start: 2019-03-15 | End: 2019-03-15 | Stop reason: SURG

## 2019-03-15 RX ORDER — ROCURONIUM BROMIDE 10 MG/ML
INJECTION, SOLUTION INTRAVENOUS AS NEEDED
Status: DISCONTINUED | OUTPATIENT
Start: 2019-03-15 | End: 2019-03-15 | Stop reason: SURG

## 2019-03-15 RX ORDER — ONDANSETRON 2 MG/ML
INJECTION INTRAMUSCULAR; INTRAVENOUS AS NEEDED
Status: DISCONTINUED | OUTPATIENT
Start: 2019-03-15 | End: 2019-03-15 | Stop reason: SURG

## 2019-03-15 RX ORDER — SODIUM CHLORIDE 9 MG/ML
INJECTION, SOLUTION INTRAVENOUS CONTINUOUS PRN
Status: DISCONTINUED | OUTPATIENT
Start: 2019-03-15 | End: 2019-03-15 | Stop reason: SURG

## 2019-03-15 RX ORDER — SODIUM CHLORIDE 9 MG/ML
50 INJECTION, SOLUTION INTRAVENOUS CONTINUOUS
Status: DISCONTINUED | OUTPATIENT
Start: 2019-03-15 | End: 2019-03-18 | Stop reason: HOSPADM

## 2019-03-15 RX ORDER — HYDROMORPHONE HCL 110MG/55ML
PATIENT CONTROLLED ANALGESIA SYRINGE INTRAVENOUS AS NEEDED
Status: DISCONTINUED | OUTPATIENT
Start: 2019-03-15 | End: 2019-03-15 | Stop reason: SURG

## 2019-03-15 RX ORDER — NALOXONE HCL 0.4 MG/ML
0.2 VIAL (ML) INJECTION AS NEEDED
Status: DISCONTINUED | OUTPATIENT
Start: 2019-03-15 | End: 2019-03-15 | Stop reason: HOSPADM

## 2019-03-15 RX ORDER — SODIUM CHLORIDE, SODIUM LACTATE, POTASSIUM CHLORIDE, CALCIUM CHLORIDE 600; 310; 30; 20 MG/100ML; MG/100ML; MG/100ML; MG/100ML
9 INJECTION, SOLUTION INTRAVENOUS CONTINUOUS
Status: DISCONTINUED | OUTPATIENT
Start: 2019-03-15 | End: 2019-03-15 | Stop reason: HOSPADM

## 2019-03-15 RX ORDER — ACETAMINOPHEN 325 MG/1
650 TABLET ORAL ONCE AS NEEDED
Status: DISCONTINUED | OUTPATIENT
Start: 2019-03-15 | End: 2019-03-15 | Stop reason: HOSPADM

## 2019-03-15 RX ORDER — HYDROMORPHONE HYDROCHLORIDE 1 MG/ML
0.5 INJECTION, SOLUTION INTRAMUSCULAR; INTRAVENOUS; SUBCUTANEOUS
Status: DISCONTINUED | OUTPATIENT
Start: 2019-03-15 | End: 2019-03-18 | Stop reason: HOSPADM

## 2019-03-15 RX ORDER — EPHEDRINE SULFATE 50 MG/ML
5 INJECTION, SOLUTION INTRAVENOUS ONCE AS NEEDED
Status: DISCONTINUED | OUTPATIENT
Start: 2019-03-15 | End: 2019-03-15 | Stop reason: HOSPADM

## 2019-03-15 RX ORDER — ALBUMIN, HUMAN INJ 5% 5 %
SOLUTION INTRAVENOUS CONTINUOUS PRN
Status: DISCONTINUED | OUTPATIENT
Start: 2019-03-15 | End: 2019-03-15 | Stop reason: SURG

## 2019-03-15 RX ORDER — ONDANSETRON 2 MG/ML
4 INJECTION INTRAMUSCULAR; INTRAVENOUS ONCE AS NEEDED
Status: DISCONTINUED | OUTPATIENT
Start: 2019-03-15 | End: 2019-03-15 | Stop reason: HOSPADM

## 2019-03-15 RX ORDER — DIPHENHYDRAMINE HYDROCHLORIDE 50 MG/ML
12.5 INJECTION INTRAMUSCULAR; INTRAVENOUS
Status: DISCONTINUED | OUTPATIENT
Start: 2019-03-15 | End: 2019-03-15 | Stop reason: HOSPADM

## 2019-03-15 RX ORDER — GLYCOPYRROLATE 0.2 MG/ML
INJECTION INTRAMUSCULAR; INTRAVENOUS AS NEEDED
Status: DISCONTINUED | OUTPATIENT
Start: 2019-03-15 | End: 2019-03-15 | Stop reason: SURG

## 2019-03-15 RX ORDER — SUCCINYLCHOLINE CHLORIDE 20 MG/ML
INJECTION INTRAMUSCULAR; INTRAVENOUS AS NEEDED
Status: DISCONTINUED | OUTPATIENT
Start: 2019-03-15 | End: 2019-03-15 | Stop reason: SURG

## 2019-03-15 RX ORDER — FAMOTIDINE 10 MG/ML
20 INJECTION, SOLUTION INTRAVENOUS ONCE
Status: COMPLETED | OUTPATIENT
Start: 2019-03-15 | End: 2019-03-15

## 2019-03-15 RX ORDER — PROPOFOL 10 MG/ML
VIAL (ML) INTRAVENOUS AS NEEDED
Status: DISCONTINUED | OUTPATIENT
Start: 2019-03-15 | End: 2019-03-15 | Stop reason: SURG

## 2019-03-15 RX ORDER — OXYCODONE AND ACETAMINOPHEN 7.5; 325 MG/1; MG/1
1 TABLET ORAL ONCE AS NEEDED
Status: DISCONTINUED | OUTPATIENT
Start: 2019-03-15 | End: 2019-03-15 | Stop reason: HOSPADM

## 2019-03-15 RX ORDER — MIDAZOLAM HYDROCHLORIDE 1 MG/ML
1 INJECTION INTRAMUSCULAR; INTRAVENOUS
Status: DISCONTINUED | OUTPATIENT
Start: 2019-03-15 | End: 2019-03-15 | Stop reason: HOSPADM

## 2019-03-15 RX ORDER — OXYCODONE HYDROCHLORIDE AND ACETAMINOPHEN 5; 325 MG/1; MG/1
1 TABLET ORAL EVERY 4 HOURS PRN
Status: DISCONTINUED | OUTPATIENT
Start: 2019-03-15 | End: 2019-03-18 | Stop reason: HOSPADM

## 2019-03-15 RX ORDER — DIPHENHYDRAMINE HCL 25 MG
25 CAPSULE ORAL
Status: DISCONTINUED | OUTPATIENT
Start: 2019-03-15 | End: 2019-03-15 | Stop reason: HOSPADM

## 2019-03-15 RX ORDER — BUPIVACAINE HYDROCHLORIDE AND EPINEPHRINE 5; 5 MG/ML; UG/ML
INJECTION, SOLUTION PERINEURAL AS NEEDED
Status: DISCONTINUED | OUTPATIENT
Start: 2019-03-15 | End: 2019-03-15 | Stop reason: HOSPADM

## 2019-03-15 RX ADMIN — ALBUMIN (HUMAN): 0.05 SOLUTION INTRAVENOUS at 13:56

## 2019-03-15 RX ADMIN — HYDROMORPHONE HYDROCHLORIDE 0.5 MG: 2 INJECTION INTRAMUSCULAR; INTRAVENOUS; SUBCUTANEOUS at 15:04

## 2019-03-15 RX ADMIN — KETOROLAC TROMETHAMINE 15 MG: 15 INJECTION, SOLUTION INTRAMUSCULAR; INTRAVENOUS at 20:02

## 2019-03-15 RX ADMIN — LIDOCAINE HYDROCHLORIDE 1 EACH: 40 SOLUTION TOPICAL at 13:06

## 2019-03-15 RX ADMIN — CEFAZOLIN SODIUM 1 G: 1 INJECTION, SOLUTION INTRAVENOUS at 23:15

## 2019-03-15 RX ADMIN — SODIUM CHLORIDE: 9 INJECTION, SOLUTION INTRAVENOUS at 13:11

## 2019-03-15 RX ADMIN — ROCURONIUM BROMIDE 20 MG: 10 INJECTION INTRAVENOUS at 14:00

## 2019-03-15 RX ADMIN — MONTELUKAST SODIUM 10 MG: 10 TABLET, FILM COATED ORAL at 21:38

## 2019-03-15 RX ADMIN — LIDOCAINE HYDROCHLORIDE 60 MG: 20 INJECTION, SOLUTION INFILTRATION; PERINEURAL at 13:00

## 2019-03-15 RX ADMIN — ROCURONIUM BROMIDE 10 MG: 10 INJECTION INTRAVENOUS at 16:00

## 2019-03-15 RX ADMIN — ROCURONIUM BROMIDE 50 MG: 10 INJECTION INTRAVENOUS at 16:38

## 2019-03-15 RX ADMIN — FENTANYL CITRATE 50 MCG: 50 INJECTION, SOLUTION INTRAMUSCULAR; INTRAVENOUS at 17:15

## 2019-03-15 RX ADMIN — ATORVASTATIN CALCIUM 40 MG: 20 TABLET, FILM COATED ORAL at 21:38

## 2019-03-15 RX ADMIN — PROPOFOL 150 MG: 10 INJECTION, EMULSION INTRAVENOUS at 13:01

## 2019-03-15 RX ADMIN — FENTANYL CITRATE 50 MCG: 50 INJECTION INTRAMUSCULAR; INTRAVENOUS at 12:53

## 2019-03-15 RX ADMIN — PHENYLEPHRINE HYDROCHLORIDE 100 MCG: 10 INJECTION INTRAVENOUS at 13:16

## 2019-03-15 RX ADMIN — FENTANYL CITRATE 50 MCG: 50 INJECTION, SOLUTION INTRAMUSCULAR; INTRAVENOUS at 12:02

## 2019-03-15 RX ADMIN — PHENYLEPHRINE HYDROCHLORIDE 0.5 MCG/KG/MIN: 10 INJECTION, SOLUTION INTRAMUSCULAR; INTRAVENOUS; SUBCUTANEOUS at 13:19

## 2019-03-15 RX ADMIN — NEOSTIGMINE METHYLSULFATE 3 MG: 1 INJECTION INTRAMUSCULAR; INTRAVENOUS; SUBCUTANEOUS at 16:16

## 2019-03-15 RX ADMIN — DEXAMETHASONE SODIUM PHOSPHATE 8 MG: 10 INJECTION INTRAMUSCULAR; INTRAVENOUS at 13:10

## 2019-03-15 RX ADMIN — OXYCODONE HYDROCHLORIDE AND ACETAMINOPHEN 1 TABLET: 10; 325 TABLET ORAL at 21:21

## 2019-03-15 RX ADMIN — FENTANYL CITRATE 50 MCG: 50 INJECTION, SOLUTION INTRAMUSCULAR; INTRAVENOUS at 17:20

## 2019-03-15 RX ADMIN — SODIUM CHLORIDE, POTASSIUM CHLORIDE, SODIUM LACTATE AND CALCIUM CHLORIDE: 600; 310; 30; 20 INJECTION, SOLUTION INTRAVENOUS at 16:08

## 2019-03-15 RX ADMIN — SUCCINYLCHOLINE CHLORIDE 40 MG: 20 INJECTION, SOLUTION INTRAMUSCULAR; INTRAVENOUS; PARENTERAL at 16:38

## 2019-03-15 RX ADMIN — Medication 2 MG: at 11:25

## 2019-03-15 RX ADMIN — MAGNESIUM SULFATE HEPTAHYDRATE 2 G: 500 INJECTION, SOLUTION INTRAMUSCULAR; INTRAVENOUS at 14:18

## 2019-03-15 RX ADMIN — ONDANSETRON 4 MG: 2 INJECTION INTRAMUSCULAR; INTRAVENOUS at 16:12

## 2019-03-15 RX ADMIN — SODIUM CHLORIDE, POTASSIUM CHLORIDE, SODIUM LACTATE AND CALCIUM CHLORIDE 9 ML/HR: 600; 310; 30; 20 INJECTION, SOLUTION INTRAVENOUS at 11:25

## 2019-03-15 RX ADMIN — PHENYLEPHRINE HYDROCHLORIDE 100 MCG: 10 INJECTION INTRAVENOUS at 13:20

## 2019-03-15 RX ADMIN — CEFAZOLIN SODIUM 2 G: 2 INJECTION, SOLUTION INTRAVENOUS at 12:59

## 2019-03-15 RX ADMIN — PHENYLEPHRINE HYDROCHLORIDE 100 MCG: 10 INJECTION INTRAVENOUS at 13:10

## 2019-03-15 RX ADMIN — SODIUM CHLORIDE 100 ML/HR: 9 INJECTION, SOLUTION INTRAVENOUS at 19:01

## 2019-03-15 RX ADMIN — METOPROLOL TARTRATE 25 MG: 25 TABLET ORAL at 21:39

## 2019-03-15 RX ADMIN — PHENYLEPHRINE HYDROCHLORIDE 100 MCG: 10 INJECTION INTRAVENOUS at 13:13

## 2019-03-15 RX ADMIN — GLYCOPYRROLATE 0.4 MG: 0.2 INJECTION INTRAMUSCULAR; INTRAVENOUS at 16:16

## 2019-03-15 RX ADMIN — FENTANYL CITRATE 50 MCG: 50 INJECTION INTRAMUSCULAR; INTRAVENOUS at 14:55

## 2019-03-15 RX ADMIN — ROCURONIUM BROMIDE 10 MG: 10 INJECTION INTRAVENOUS at 13:01

## 2019-03-15 RX ADMIN — HYDROMORPHONE HYDROCHLORIDE 0.5 MG: 1 INJECTION, SOLUTION INTRAMUSCULAR; INTRAVENOUS; SUBCUTANEOUS at 18:57

## 2019-03-15 RX ADMIN — SUCCINYLCHOLINE CHLORIDE 100 MG: 20 INJECTION, SOLUTION INTRAMUSCULAR; INTRAVENOUS; PARENTERAL at 13:01

## 2019-03-15 RX ADMIN — FAMOTIDINE 20 MG: 10 INJECTION INTRAVENOUS at 11:25

## 2019-03-15 RX ADMIN — HYDROMORPHONE HYDROCHLORIDE 0.5 MG: 1 INJECTION, SOLUTION INTRAMUSCULAR; INTRAVENOUS; SUBCUTANEOUS at 17:15

## 2019-03-15 RX ADMIN — ROCURONIUM BROMIDE 10 MG: 10 INJECTION INTRAVENOUS at 14:28

## 2019-03-15 RX ADMIN — INSULIN LISPRO 2 UNITS: 100 INJECTION, SOLUTION INTRAVENOUS; SUBCUTANEOUS at 22:00

## 2019-03-15 RX ADMIN — FENTANYL CITRATE 50 MCG: 50 INJECTION INTRAMUSCULAR; INTRAVENOUS at 12:58

## 2019-03-15 RX ADMIN — TAMSULOSIN HYDROCHLORIDE 0.4 MG: 0.4 CAPSULE ORAL at 21:39

## 2019-03-15 RX ADMIN — SODIUM CHLORIDE, POTASSIUM CHLORIDE, SODIUM LACTATE AND CALCIUM CHLORIDE: 600; 310; 30; 20 INJECTION, SOLUTION INTRAVENOUS at 14:53

## 2019-03-15 RX ADMIN — Medication 2 MG: at 16:32

## 2019-03-15 RX ADMIN — MIDAZOLAM 1 MG: 1 INJECTION INTRAMUSCULAR; INTRAVENOUS at 12:02

## 2019-03-15 RX ADMIN — ROCURONIUM BROMIDE 20 MG: 10 INJECTION INTRAVENOUS at 13:15

## 2019-03-15 NOTE — ANESTHESIA PROCEDURE NOTES
Arterial Line    Pre-sedation assessment completed: 3/15/2019 12:10 PM    Patient reassessed immediately prior to procedure    Patient location during procedure: holding area   Line placed for hemodynamic monitoring and ABGs/Labs/ISTAT.  Performed By   Anesthesiologist: Ramiro Herrera MD  Preanesthetic Checklist  Completed: patient identified, surgical consent, pre-op evaluation, timeout performed and risks and benefits discussed  Arterial Line Prep   Prep: ChloraPrep and alcohol swabs  Patient monitoring: blood pressure monitoring, continuous pulse oximetry and EKG  Arterial Line Procedure   Laterality:left  Location:  radial artery  Catheter size: 20 G   Guidance: ultrasound guided  PROCEDURE NOTE/ULTRASOUND INTERPRETATION.  Using ultrasound guidance the potential vascular sites for insertion of the catheter were visualized to determine the patency of the vessel to be used for vascular access.  After selecting the appropriate site for insertion, the needle was visualized under ultrasound being inserted into the radial artery, followed by ultrasound confirmation of wire and catheter placement. There were no abnormalities seen on ultrasound; an image was taken; and the patient tolerated the procedure with no complications.   Number of attempts: 1  Successful placement: yes          Post Assessment   Dressing Type: occlusive dressing applied and secured with tape.   Complications no  Patient Tolerance: patient tolerated the procedure well with no apparent complications

## 2019-03-15 NOTE — H&P
CHIEF COMPLAINT:     Possible pancreas neoplasm     HISTORY OF PRESENT ILLNESS:    Juan J Johnson Jr. is a 73 y.o. male who who was admitted on 1/9/2019 for acute pancreatitis. The disease affected the tail. The initial CT showed a possible SV thrombus and was also concerning for a neoplasm of the pancreatic tail.      Was discharged from the hospital he has been tolerating his usual diet.  There've been no fevers.  There is been no nausea or vomiting.  There is no diarrhea.  There is no abdominal pain.     Past Medical History:   Diagnosis Date   • Arthritis    • Bilateral kidney stones    • Colon polyp 09/20/2017    POLYPS REMOVED DURING SCOPE   • Diabetes mellitus type 2, noninsulin dependent (CMS/HCC)    • GERD (gastroesophageal reflux disease)    • High cholesterol    • History of pancreatitis 02/2019   • Hypertension    • Pancreatic mass    • Sleep apnea     NO CPAP CURRENTLY - WORKING WITH DR BELL    • Splenic vein thrombosis     POSSIBLE? - RECENT CT SCAN RESOLVED, OFF BLOOD THINNERS        Past Surgical History:   Procedure Laterality Date   • CYSTOSCOPY W/ URETERAL STENT PLACEMENT Left 2/11/2019    Procedure: CYSTOSCOPY URETERAL CATHETER/STENT INSERTION;  Surgeon: Adonis Simpson MD;  Location: [facility];  Service: Urology   • ENDOSCOPY AND COLONOSCOPY N/A 2017    [facility]    • EXTRACORPOREAL SHOCK WAVE LITHOTRIPSY (ESWL) N/A 2014, 2/2019   • FINGER/THUMB CLOSED REDUCTION AND PERCUTANEOUS PINNING Left 1982    left thumb   • SINUS SURGERY N/A 1970s   • TOTAL HIP ARTHROPLASTY Left 6/13/2016    Procedure: LT TOTAL HIP ARTHROPLASTY ANTERIOR;  Surgeon: Sandor Phillips MD;  Location: [facility] OR;  Service:    • TOTAL HIP ARTHROPLASTY Right 7/20/2016    Procedure: RT TOTAL HIP ARTHROPLASTY ANTERIOR WITH HANA TABLE;  Surgeon: Sandor Phillips MD;  Location: [facility] OR;  Service:          Current Facility-Administered Medications:   •  ceFAZolin in dextrose (ANCEF) IVPB  solution 2 g, 2 g, Intravenous, Once, Gm Sanders MD  •  fentaNYL citrate (PF) (SUBLIMAZE) injection 50 mcg, 50 mcg, Intravenous, Q10 Min PRN, Hernán Yost MD, 50 mcg at 03/15/19 1202  •  lactated ringers infusion, 9 mL/hr, Intravenous, Continuous, Hernán Yost MD, Last Rate: 9 mL/hr at 03/15/19 1125, 9 mL/hr at 03/15/19 1125  •  lidocaine PF 1% (XYLOCAINE) injection 0.5 mL, 0.5 mL, Injection, Once PRN, Hernán Yost MD  •  midazolam (VERSED) injection 1 mg, 1 mg, Intravenous, Q5 Min PRN, 1 mg at 03/15/19 1202 **OR** midazolam (VERSED) injection 2 mg, 2 mg, Intravenous, Q5 Min PRN, Hernán Yost MD, 2 mg at 03/15/19 1125  •  sodium chloride 0.9 % flush 1-10 mL, 1-10 mL, Intravenous, PRN, Hernán Yost MD    Allergies   Allergen Reactions   • Robaxin [Methocarbamol] Rash       Family History   Problem Relation Age of Onset   • Diabetes Maternal Grandfather    • Diabetes Mother    • Hypertension Mother    • Diabetes Sister    • Hypertension Sister    • Malig Hyperthermia Neg Hx        Social History     Socioeconomic History   • Marital status:      Spouse name: Not on file   • Number of children: Not on file   • Years of education: Not on file   • Highest education level: Not on file   Social Needs   • Financial resource strain: Not on file   • Food insecurity - worry: Not on file   • Food insecurity - inability: Not on file   • Transportation needs - medical: Not on file   • Transportation needs - non-medical: Not on file   Occupational History   • Not on file   Tobacco Use   • Smoking status: Never Smoker   • Smokeless tobacco: Never Used   Substance and Sexual Activity   • Alcohol use: Yes     Alcohol/week: 0.6 oz     Types: 1 Cans of beer per week     Comment: 1 drink a week   • Drug use: No   • Sexual activity: Yes     Partners: Female     Birth control/protection: None   Other Topics Concern   • Not on file   Social History Narrative   • Not on file  "      Review of Systems   Constitutional: Negative for fever.   HENT: Positive for sinus pressure.    Respiratory: Positive for apnea and cough.    Gastrointestinal: Positive for abdominal distention, abdominal pain, nausea and vomiting. Negative for constipation and diarrhea.   Endocrine: Positive for polydipsia.   Genitourinary: Negative for dysuria, frequency and hematuria.   Musculoskeletal: Positive for arthralgias. Negative for myalgias.   Allergic/Immunologic: Positive for environmental allergies.   Neurological: Positive for headaches.       Objective     /74   Pulse 86   Temp 97.6 °F (36.4 °C) (Oral)   Resp 18   Ht 170.2 cm (67\")   Wt 91.9 kg (202 lb 9.6 oz)   SpO2 99%   BMI 31.73 kg/m²     Physical Exam   Constitutional: He is oriented to person, place, and time. He appears well-developed and well-nourished. No distress.   HENT:   Head: Normocephalic and atraumatic.   Eyes: Conjunctivae are normal. No scleral icterus.   Cardiovascular: Normal rate, regular rhythm, normal heart sounds and intact distal pulses.   No murmur heard.  Pulmonary/Chest: Effort normal and breath sounds normal. No respiratory distress. He has no wheezes. He has no rales.   Abdominal: Soft. Bowel sounds are normal. He exhibits no distension. There is no tenderness. There is no guarding.   Musculoskeletal: He exhibits no edema or deformity.   Neurological: He is alert and oriented to person, place, and time.   Skin: Skin is warm and dry.   Psychiatric: He has a normal mood and affect.   Vitals reviewed.      DIAGNOSTIC DATA:    I reviewed the CT images obtained this year showing the pancreatitis and possible mass    ASSESSMENT:     Recent pancreatitis- improved  Probable portal vein thrombus  Possible pancreatic mass  Possible sleep apnea  Bilateral, small, nonobstructing nephrolithiasis     PLAN:     Continue apixaban- I will need a refill today, which I will send to his pharmacy   Repeat CT on 2/7/2019 using pancreatic " protocol  Will decide about further management of a pancreatic mass and continued anticoagulation for portal vein thrombus based on the outcome of the pancreatic protocol CT abdomen and pelvis.  I will call him after the CT scan is completed, and I have reviewed the images  He has appointments with urology and pulmonology regarding the nephrolithiasis and sleep apnea respectively.

## 2019-03-15 NOTE — ANESTHESIA PREPROCEDURE EVALUATION
Anesthesia Evaluation     Patient summary reviewed and Nursing notes reviewed   no history of anesthetic complications:               Airway   Mallampati: II  TM distance: >3 FB  Neck ROM: full  no difficulty expected  Dental - normal exam     Pulmonary     breath sounds clear to auscultation  (+) sleep apnea (cant use CPAP due to claustrophobia),   (-) shortness of breath, decreased breath sounds, wheezes  Cardiovascular - normal exam  Exercise tolerance: good (4-7 METS)    Rhythm: regular  Rate: normal    (+) hypertension well controlled 2 medications or greater, hyperlipidemia,   (-) past MI, angina, CHF, orthopnea, PND, NOONAN, PVD      Neuro/Psych- negative ROS  (-) seizures, neuromuscular disease, TIA, CVA, dizziness/light headedness, weakness, numbness  GI/Hepatic/Renal/Endo    (+) morbid obesity, GERD,  diabetes mellitus type 2,   (-) liver disease    ROS Comment: Hx pacreatitis      Musculoskeletal     (+) arthralgias,   Abdominal  - normal exam   Substance History - negative use  (-) alcohol use, drug use     OB/GYN negative ob/gyn ROS         Other   (+) arthritis                     Anesthesia Plan    ASA 3     general   (A-line)  intravenous induction   Anesthetic plan, all risks, benefits, and alternatives have been provided, discussed and informed consent has been obtained with: patient.    Plan discussed with CRNA.

## 2019-03-15 NOTE — OP NOTE
PREOPERATIVE DIAGNOSIS:    Pancreatic mass [K86.9]    POSTOPERATIVE DIAGNOSIS:  Pancreatic mass [K86.9]    PROCEDURE:    Laparoscopic distal pancreatectomy and splenectomy    SURGEON/STAFF:  Gm Sanders M.D.  ASSISTANT:  Lit Bauer SA  ANESTHESIA:  General.   BLOOD LOSS: Minimal  COUNTS:  Needle and sponge count correct.    SPECIMENS:      Order Name Source Comment Collection Info Order Time   TYPE AND SCREEN Blood, Arterial Line  Collected By: Gm Sanders MD 3/15/2019  1:47 PM   TISSUE PATHOLOGY EXAM Pancreas  Collected By: Gm Sanders MD 3/15/2019  4:01 PM       INDICATIONS FOR OPERATION:  Juan J Johnson Jr. is a 73 y.o. man who presented to the hospital with pancreatitis involving the distal portion of the gland.  Evaluation with CT imaging has shown a mass.  It is persisted even after resolution of the inflammatory changes.  He is taken to the operating room today for distal pancreatectomy.       OPERATION:  The patient was brought to the operating room in stable condition.  Perioperative antibiotics were administered. Sequential compression devices were in place. The patient was positioned supine on the operating room table.  General anesthesia was induced without difficulty. The patient's abdomen was prepped and draped in the usual sterile fashion.      The initial laparoscopic trocar was a 10 mm Optiview port placed in the medial right upper quadrant under laparoscopic vision.  A 30° laparoscope was inserted to verify correct positioning of the initial trocar.  No injury from its insertion was identified.  Additional trocars were then placed under laparoscopic vision.  These included a 5 mm trocar in the lateral right upper quadrant, a 10 mm trocar in the medial left upper quadrant, a 15 mm trocar in the lateral left upper quadrant, and a 5 mm trocar in the left flank.    I opened the omentum along the greater curvature the stomach with a harmonic scalpel.  This afforded  entry into the lesser sac.  The anterior surface of the pancreas was visualized.  There was a mass involving the distal pancreas as expected from CT scan.  I used the harmonic scalpel to take down the splenic flexure of the colon.  I divided the attachments between the spleen and lateral abdominal wall.  I opened the retroperitoneum along the inferior border of the pancreas with the harmonic scalpel.  I entered the plane between the pancreas and retroperitoneum.    I inspected the distal portion of the gland.  I did not think the gland could be dissected from the hilum of the spleen and an inferior branch of the splenic artery, which seemed densely adherent to the superior border of the pancreas.  I elected to remove the spleen and tail of pancreas en bloc. I used two 60 mm vascular cartridges of the linear cutting stapler to divide the distal pancreas, splenic artery, and splenic vein.    I used the harmonic scalpel to complete the retroperitoneal dissection. The spleen and pancreas were placed in an endoscopic retrieval bag en bloc. The lesion was not fractured or cut.  The left upper quadrant was irrigated with a liter of saline. Bleeding points were cauterized with the harmonic scalpel and electrocautery hook. I applied 3 gm of Devon powder to the resection bed of the spleen and pancreas. I applied 10 mL of Tisseel spray to the cut edge of the pancreas. I introduced a 19 Gabonese fluted drain into the left upper quadrant through the left flank 5 mm trocar. I directed it into the area of the splenic bed and arranged it to terminate near the cut edge of the pancreas.     The trocars were removed under direct vision as the carbon dioxide insufflation was being evacuated. I enlarged the 15 mm trocar site to about 3 cm. I fractured the spleen to remove the specimen, and tried to preserve the relationship between the hilum of the spleen and the pancreas. The spleen fragments and distal pancreas and remaining spleen  were submitted separately.     The fascia was closed at the extraction site in two layers with 0 PDS. The fascia at the 10 mm trocar sites wa closed with 0 Vicryl. The skin was closed with 4-0 Vicryl and streri-strips.  He tolerated the procedure very well, and is transported to the recovery room in stable condition.

## 2019-03-15 NOTE — ANESTHESIA PROCEDURE NOTES
Airway  Urgency: elective    Difficult airway    General Information and Staff    Patient location during procedure: OR  Anesthesiologist: Dk Gong MD  CRNA: Carley Armstrong CRNA    Indications and Patient Condition  Indications for airway management: airway protection    Preoxygenated: yes  MILS maintained throughout  Mask difficulty assessment: 1 - vent by mask    Final Airway Details  Final airway type: endotracheal airway      Successful airway: ETT  Cuffed: yes   Successful intubation technique: direct laryngoscopy  Facilitating devices/methods: Bougie  Endotracheal tube insertion site: oral  Blade: Molly  Blade size: 3  ETT size (mm): 7.5  Cormack-Lehane Classification: grade III - view of epiglottis only  Placement verified by: chest auscultation and capnometry   Cuff volume (mL): 6  Measured from: lips  ETT to lips (cm): 21  Number of attempts at approach: 1    Additional Comments  Easy mask, Able to use bougie, Atraumatic ET Tube placement.  Teeth as pre-op. BLEBS.  -ABD sounds.  +ET CO2.  Secured to face

## 2019-03-15 NOTE — ANESTHESIA POSTPROCEDURE EVALUATION
"Patient: Juan J Johnson Jr.    Procedure Summary     Date:  03/15/19 Room / Location:  [facility] OR [address] [place] [address] MAIN OR    Anesthesia Start:  1253 Anesthesia Stop:  1702    Procedure:  PANCREATIC RESECTION LAPAROSCOPIC AND SPLEENECTOMY (N/A Abdomen) Diagnosis:       Pancreatic mass      (Pancreatic mass [K86.9])    Surgeon:  Gm Sanders MD Provider:  Dk Gong MD    Anesthesia Type:  general ASA Status:  3          Anesthesia Type: general  Last vitals  BP   161/96 (03/15/19 1815)   Temp   36.4 °C (97.6 °F) (03/15/19 1745)   Pulse   92 (03/15/19 1815)   Resp   16 (03/15/19 1815)     SpO2   99 % (03/15/19 1815)     Post Anesthesia Care and Evaluation    Patient location during evaluation: bedside  Patient participation: complete - patient participated  Level of consciousness: awake and alert  Pain management: adequate  Airway patency: patent  Anesthetic complications: No anesthetic complications    Cardiovascular status: acceptable  Respiratory status: acceptable  Hydration status: acceptable    Comments: /96   Pulse 92   Temp 36.4 °C (97.6 °F) (Oral)   Resp 16   Ht 170.2 cm (67\")   Wt 91.9 kg (202 lb 9.6 oz)   SpO2 99%   BMI 31.73 kg/m²       "

## 2019-03-16 LAB
ALBUMIN SERPL-MCNC: 3.9 G/DL (ref 3.5–5.2)
ALBUMIN/GLOB SERPL: 1.6 G/DL
ALP SERPL-CCNC: 61 U/L (ref 39–117)
ALT SERPL W P-5'-P-CCNC: 18 U/L (ref 1–41)
ANION GAP SERPL CALCULATED.3IONS-SCNC: 14.2 MMOL/L
AST SERPL-CCNC: 15 U/L (ref 1–40)
BASOPHILS # BLD AUTO: 0.02 10*3/MM3 (ref 0–0.2)
BASOPHILS NFR BLD AUTO: 0.1 % (ref 0–1.5)
BILIRUB SERPL-MCNC: 0.2 MG/DL (ref 0.1–1.2)
BUN BLD-MCNC: 14 MG/DL (ref 8–23)
BUN/CREAT SERPL: 15.4 (ref 7–25)
CALCIUM SPEC-SCNC: 8.4 MG/DL (ref 8.6–10.5)
CHLORIDE SERPL-SCNC: 98 MMOL/L (ref 98–107)
CO2 SERPL-SCNC: 23.8 MMOL/L (ref 22–29)
CREAT BLD-MCNC: 0.91 MG/DL (ref 0.76–1.27)
DEPRECATED RDW RBC AUTO: 47.8 FL (ref 37–54)
EOSINOPHIL # BLD AUTO: 0 10*3/MM3 (ref 0–0.4)
EOSINOPHIL NFR BLD AUTO: 0 % (ref 0.3–6.2)
ERYTHROCYTE [DISTWIDTH] IN BLOOD BY AUTOMATED COUNT: 14 % (ref 12.3–15.4)
GFR SERPL CREATININE-BSD FRML MDRD: 82 ML/MIN/1.73
GLOBULIN UR ELPH-MCNC: 2.4 GM/DL
GLUCOSE BLD-MCNC: 143 MG/DL (ref 65–99)
GLUCOSE BLDC GLUCOMTR-MCNC: 105 MG/DL (ref 70–130)
GLUCOSE BLDC GLUCOMTR-MCNC: 108 MG/DL (ref 70–130)
GLUCOSE BLDC GLUCOMTR-MCNC: 130 MG/DL (ref 70–130)
GLUCOSE BLDC GLUCOMTR-MCNC: 131 MG/DL (ref 70–130)
HBA1C MFR BLD: 6.02 % (ref 4.8–5.6)
HCT VFR BLD AUTO: 32.8 % (ref 37.5–51)
HGB BLD-MCNC: 10.4 G/DL (ref 13–17.7)
IMM GRANULOCYTES # BLD AUTO: 0.17 10*3/MM3 (ref 0–0.05)
IMM GRANULOCYTES NFR BLD AUTO: 1.2 % (ref 0–0.5)
LIPASE SERPL-CCNC: 53 U/L (ref 13–60)
LYMPHOCYTES # BLD AUTO: 0.77 10*3/MM3 (ref 0.7–3.1)
LYMPHOCYTES NFR BLD AUTO: 5.4 % (ref 19.6–45.3)
MCH RBC QN AUTO: 30 PG (ref 26.6–33)
MCHC RBC AUTO-ENTMCNC: 31.7 G/DL (ref 31.5–35.7)
MCV RBC AUTO: 94.5 FL (ref 79–97)
MONOCYTES # BLD AUTO: 1.17 10*3/MM3 (ref 0.1–0.9)
MONOCYTES NFR BLD AUTO: 8.1 % (ref 5–12)
NEUTROPHILS # BLD AUTO: 12.25 10*3/MM3 (ref 1.4–7)
NEUTROPHILS NFR BLD AUTO: 85.2 % (ref 42.7–76)
NRBC BLD AUTO-RTO: 0 /100 WBC (ref 0–0)
PLATELET # BLD AUTO: 227 10*3/MM3 (ref 140–450)
PMV BLD AUTO: 10.7 FL (ref 6–12)
POTASSIUM BLD-SCNC: 4.8 MMOL/L (ref 3.5–5.2)
PROT SERPL-MCNC: 6.3 G/DL (ref 6–8.5)
RBC # BLD AUTO: 3.47 10*6/MM3 (ref 4.14–5.8)
SODIUM BLD-SCNC: 136 MMOL/L (ref 136–145)
WBC NRBC COR # BLD: 14.38 10*3/MM3 (ref 3.4–10.8)

## 2019-03-16 PROCEDURE — 85025 COMPLETE CBC W/AUTO DIFF WBC: CPT | Performed by: SURGERY

## 2019-03-16 PROCEDURE — 25010000003 CEFAZOLIN 1-4 GM/50ML-% SOLUTION: Performed by: SURGERY

## 2019-03-16 PROCEDURE — 99024 POSTOP FOLLOW-UP VISIT: CPT | Performed by: SURGERY

## 2019-03-16 PROCEDURE — 25010000002 KETOROLAC TROMETHAMINE PER 15 MG: Performed by: SURGERY

## 2019-03-16 PROCEDURE — 83036 HEMOGLOBIN GLYCOSYLATED A1C: CPT | Performed by: SURGERY

## 2019-03-16 PROCEDURE — 82962 GLUCOSE BLOOD TEST: CPT

## 2019-03-16 PROCEDURE — 83690 ASSAY OF LIPASE: CPT | Performed by: SURGERY

## 2019-03-16 PROCEDURE — 25010000002 ONDANSETRON PER 1 MG: Performed by: SURGERY

## 2019-03-16 PROCEDURE — 80053 COMPREHEN METABOLIC PANEL: CPT | Performed by: SURGERY

## 2019-03-16 RX ORDER — NICOTINE POLACRILEX 4 MG
15 LOZENGE BUCCAL
Status: DISCONTINUED | OUTPATIENT
Start: 2019-03-16 | End: 2019-03-18 | Stop reason: HOSPADM

## 2019-03-16 RX ORDER — GLIPIZIDE 5 MG/1
2.5 TABLET ORAL
Status: DISCONTINUED | OUTPATIENT
Start: 2019-03-17 | End: 2019-03-18 | Stop reason: HOSPADM

## 2019-03-16 RX ORDER — DEXTROSE MONOHYDRATE 25 G/50ML
25 INJECTION, SOLUTION INTRAVENOUS
Status: DISCONTINUED | OUTPATIENT
Start: 2019-03-16 | End: 2019-03-18 | Stop reason: HOSPADM

## 2019-03-16 RX ADMIN — SODIUM CHLORIDE 50 ML/HR: 9 INJECTION, SOLUTION INTRAVENOUS at 21:00

## 2019-03-16 RX ADMIN — KETOROLAC TROMETHAMINE 15 MG: 15 INJECTION, SOLUTION INTRAMUSCULAR; INTRAVENOUS at 01:45

## 2019-03-16 RX ADMIN — KETOROLAC TROMETHAMINE 15 MG: 15 INJECTION, SOLUTION INTRAMUSCULAR; INTRAVENOUS at 06:52

## 2019-03-16 RX ADMIN — KETOROLAC TROMETHAMINE 15 MG: 15 INJECTION, SOLUTION INTRAMUSCULAR; INTRAVENOUS at 18:30

## 2019-03-16 RX ADMIN — LOSARTAN POTASSIUM: 50 TABLET, FILM COATED ORAL at 08:05

## 2019-03-16 RX ADMIN — OXYCODONE HYDROCHLORIDE AND ACETAMINOPHEN 1 TABLET: 10; 325 TABLET ORAL at 17:48

## 2019-03-16 RX ADMIN — ONDANSETRON HYDROCHLORIDE 4 MG: 2 SOLUTION INTRAMUSCULAR; INTRAVENOUS at 05:20

## 2019-03-16 RX ADMIN — OXYCODONE HYDROCHLORIDE AND ACETAMINOPHEN 1 TABLET: 10; 325 TABLET ORAL at 22:02

## 2019-03-16 RX ADMIN — METOPROLOL TARTRATE 25 MG: 25 TABLET ORAL at 21:30

## 2019-03-16 RX ADMIN — PANTOPRAZOLE SODIUM 40 MG: 40 TABLET, DELAYED RELEASE ORAL at 06:52

## 2019-03-16 RX ADMIN — CEFAZOLIN SODIUM 1 G: 1 INJECTION, SOLUTION INTRAVENOUS at 05:06

## 2019-03-16 RX ADMIN — SODIUM CHLORIDE 100 ML/HR: 9 INJECTION, SOLUTION INTRAVENOUS at 11:57

## 2019-03-16 RX ADMIN — KETOROLAC TROMETHAMINE 15 MG: 15 INJECTION, SOLUTION INTRAMUSCULAR; INTRAVENOUS at 13:07

## 2019-03-16 RX ADMIN — OXYCODONE HYDROCHLORIDE AND ACETAMINOPHEN 1 TABLET: 10; 325 TABLET ORAL at 05:06

## 2019-03-16 RX ADMIN — OXYCODONE HYDROCHLORIDE AND ACETAMINOPHEN 1 TABLET: 10; 325 TABLET ORAL at 11:57

## 2019-03-16 RX ADMIN — METOPROLOL TARTRATE 25 MG: 25 TABLET ORAL at 08:05

## 2019-03-16 RX ADMIN — TAMSULOSIN HYDROCHLORIDE 0.4 MG: 0.4 CAPSULE ORAL at 21:30

## 2019-03-16 RX ADMIN — MONTELUKAST SODIUM 10 MG: 10 TABLET, FILM COATED ORAL at 21:31

## 2019-03-16 RX ADMIN — ATORVASTATIN CALCIUM 40 MG: 20 TABLET, FILM COATED ORAL at 16:27

## 2019-03-16 NOTE — PLAN OF CARE
Problem: Patient Care Overview  Goal: Plan of Care Review  Outcome: Ongoing (interventions implemented as appropriate)   03/16/19 7098   Coping/Psychosocial   Plan of Care Reviewed With patient;spouse   Plan of Care Review   Progress improving   OTHER   Outcome Summary Pt has had PRN pain medications x 1 thus far this shift. Up with stand by assist. Voiding well. No C/O N/V. Ambulating in halls and room. Family ad bedside. To be advanced to Full Liquid diet. Will continue to monitor.      Goal: Individualization and Mutuality  Outcome: Ongoing (interventions implemented as appropriate)    Goal: Discharge Needs Assessment  Outcome: Ongoing (interventions implemented as appropriate)    Goal: Interprofessional Rounds/Family Conf  Outcome: Ongoing (interventions implemented as appropriate)      Problem: Pain, Acute (Adult)  Goal: Acceptable Pain Control/Comfort Level  Outcome: Ongoing (interventions implemented as appropriate)

## 2019-03-16 NOTE — PLAN OF CARE
Problem: Patient Care Overview  Goal: Plan of Care Review  Outcome: Ongoing (interventions implemented as appropriate)   03/16/19 0418   Coping/Psychosocial   Plan of Care Reviewed With patient   Plan of Care Review   Progress no change   OTHER   Outcome Summary admit from pacu, pancreatic tail & spleen removed, 4 lap sites, on 3 L 02 NC, ACHS, F/C removed this am, RASHAUN draining, clear diet       Problem: Pain, Acute (Adult)  Goal: Acceptable Pain Control/Comfort Level  Outcome: Ongoing (interventions implemented as appropriate)

## 2019-03-16 NOTE — PROGRESS NOTES
Chief Complaint:    POD 1, S/P laparoscopic distal pancreatectomy and splenectomy    Subjective:    Postoperative pain is adequately managed.  Passing flatus.  Ambulating in li.    Objective:    Vitals:    03/16/19 0335 03/16/19 0733 03/16/19 1120 03/16/19 1644   BP: 142/84 137/80 123/70 148/85   BP Location: Left arm Left arm Right arm Right arm   Patient Position: Lying Lying Sitting Sitting   Pulse: 98 77 75 91   Resp: 18 18 16 16   Temp: 97.2 °F (36.2 °C) 97.1 °F (36.2 °C) 98.5 °F (36.9 °C) 96.9 °F (36.1 °C)   TempSrc: Oral Oral Oral Oral   SpO2: 100% 96% 99% 98%   Weight:       Height:           Lungs: Clear  Heart: Regular  Abdomen: Incisions are dressed and dry. BS present.  Abdomen nondistended.  Extremities: Warm    Labs reviewed.  Glucose 108-181.  Creat 0.91, CO2 23.8.  AST 18, T bili 0.2.  Lipase 53.  WBC 14.38, Hgb 10.4, platelets 227.    Assessment:    POD 1, S/P laparoscopic distal pancreatectomy and splenectomy    Plan:    Advance diet to full liquids.  Pichardo catheter removed today.  Continue Percocet,  Hydromorphone, and ketorolac.  Continue to  monitor blood glucose.  Will resume usual doses of Amaryl and metformin.

## 2019-03-17 LAB
DEPRECATED RDW RBC AUTO: 47.5 FL (ref 37–54)
ERYTHROCYTE [DISTWIDTH] IN BLOOD BY AUTOMATED COUNT: 14.1 % (ref 12.3–15.4)
GLUCOSE BLDC GLUCOMTR-MCNC: 105 MG/DL (ref 70–130)
GLUCOSE BLDC GLUCOMTR-MCNC: 113 MG/DL (ref 70–130)
GLUCOSE BLDC GLUCOMTR-MCNC: 61 MG/DL (ref 70–130)
GLUCOSE BLDC GLUCOMTR-MCNC: 86 MG/DL (ref 70–130)
HCT VFR BLD AUTO: 33.1 % (ref 37.5–51)
HGB BLD-MCNC: 10.3 G/DL (ref 13–17.7)
MCH RBC QN AUTO: 29.1 PG (ref 26.6–33)
MCHC RBC AUTO-ENTMCNC: 31.1 G/DL (ref 31.5–35.7)
MCV RBC AUTO: 93.5 FL (ref 79–97)
PLATELET # BLD AUTO: 251 10*3/MM3 (ref 140–450)
PMV BLD AUTO: 10.7 FL (ref 6–12)
RBC # BLD AUTO: 3.54 10*6/MM3 (ref 4.14–5.8)
WBC NRBC COR # BLD: 15.06 10*3/MM3 (ref 3.4–10.8)

## 2019-03-17 PROCEDURE — 85027 COMPLETE CBC AUTOMATED: CPT | Performed by: SURGERY

## 2019-03-17 PROCEDURE — 25010000002 KETOROLAC TROMETHAMINE PER 15 MG: Performed by: SURGERY

## 2019-03-17 PROCEDURE — 99024 POSTOP FOLLOW-UP VISIT: CPT | Performed by: SURGERY

## 2019-03-17 PROCEDURE — 82962 GLUCOSE BLOOD TEST: CPT

## 2019-03-17 RX ADMIN — KETOROLAC TROMETHAMINE 15 MG: 15 INJECTION, SOLUTION INTRAMUSCULAR; INTRAVENOUS at 13:25

## 2019-03-17 RX ADMIN — OXYCODONE HYDROCHLORIDE AND ACETAMINOPHEN 1 TABLET: 10; 325 TABLET ORAL at 09:14

## 2019-03-17 RX ADMIN — ATORVASTATIN CALCIUM 40 MG: 20 TABLET, FILM COATED ORAL at 18:10

## 2019-03-17 RX ADMIN — MONTELUKAST SODIUM 10 MG: 10 TABLET, FILM COATED ORAL at 20:48

## 2019-03-17 RX ADMIN — OXYCODONE HYDROCHLORIDE AND ACETAMINOPHEN 1 TABLET: 10; 325 TABLET ORAL at 04:13

## 2019-03-17 RX ADMIN — TAMSULOSIN HYDROCHLORIDE 0.4 MG: 0.4 CAPSULE ORAL at 20:48

## 2019-03-17 RX ADMIN — PANTOPRAZOLE SODIUM 40 MG: 40 TABLET, DELAYED RELEASE ORAL at 06:23

## 2019-03-17 RX ADMIN — METOPROLOL TARTRATE 25 MG: 25 TABLET ORAL at 09:13

## 2019-03-17 RX ADMIN — KETOROLAC TROMETHAMINE 15 MG: 15 INJECTION, SOLUTION INTRAMUSCULAR; INTRAVENOUS at 07:48

## 2019-03-17 RX ADMIN — GLIPIZIDE 2.5 MG: 5 TABLET ORAL at 07:48

## 2019-03-17 RX ADMIN — OXYCODONE HYDROCHLORIDE AND ACETAMINOPHEN 1 TABLET: 10; 325 TABLET ORAL at 16:26

## 2019-03-17 RX ADMIN — METOPROLOL TARTRATE 25 MG: 25 TABLET ORAL at 20:48

## 2019-03-17 RX ADMIN — OXYCODONE HYDROCHLORIDE AND ACETAMINOPHEN 1 TABLET: 10; 325 TABLET ORAL at 20:53

## 2019-03-17 RX ADMIN — LOSARTAN POTASSIUM: 50 TABLET, FILM COATED ORAL at 09:14

## 2019-03-17 NOTE — PROGRESS NOTES
Chief Complaint:    POD 2, S/P laparoscopic distal pancreatectomy and splenectomy    Subjective:    Postoperative pain is adequately managed.  Passing flatus.  Ambulating in li. Tolerating diet.    Objective:    Vitals:    03/16/19 1933 03/17/19 0330 03/17/19 0735 03/17/19 1627   BP: 120/77 146/80 145/75 157/88   BP Location: Right arm Left arm Left arm Right arm   Patient Position: Lying Sitting Sitting Sitting   Pulse: 86 96 108 96   Resp: 16 18 18 18   Temp: 96.9 °F (36.1 °C) 97.8 °F (36.6 °C) 97.7 °F (36.5 °C) 97.8 °F (36.6 °C)   TempSrc: Oral Oral Oral Oral   SpO2: 96% 96% 96% 97%   Weight:       Height:         Drain output 65 mL yesterday  Lungs: Clear  Heart: Regular  Abdomen: Incisions are dressed and dry. BS present.  Abdomen nondistended.  Extremities: Warm    Labs reviewed.  WBC 15.06, Hgb 10.3, platelets 251.    Assessment:    POD 2, S/P laparoscopic distal pancreatectomy and splenectomy    Plan:    Advance to regular diet.  Continue Percocet, hydromorphone, and ketorolac.  Continue to  monitor blood glucose.  Check drain amylase tomorrow.

## 2019-03-18 VITALS
OXYGEN SATURATION: 94 % | DIASTOLIC BLOOD PRESSURE: 83 MMHG | HEIGHT: 67 IN | SYSTOLIC BLOOD PRESSURE: 165 MMHG | HEART RATE: 115 BPM | RESPIRATION RATE: 18 BRPM | BODY MASS INDEX: 31.8 KG/M2 | TEMPERATURE: 98.2 F | WEIGHT: 202.6 LBS

## 2019-03-18 LAB
AMYLASE FLD-CCNC: 1384 U/L
CYTO UR: NORMAL
DEPRECATED RDW RBC AUTO: 49.3 FL (ref 37–54)
ERYTHROCYTE [DISTWIDTH] IN BLOOD BY AUTOMATED COUNT: 14.1 % (ref 12.3–15.4)
GLUCOSE BLDC GLUCOMTR-MCNC: 126 MG/DL (ref 70–130)
HCT VFR BLD AUTO: 33.7 % (ref 37.5–51)
HGB BLD-MCNC: 10.6 G/DL (ref 13–17.7)
LAB AP CASE REPORT: NORMAL
LAB AP DIAGNOSIS COMMENT: NORMAL
MCH RBC QN AUTO: 29.8 PG (ref 26.6–33)
MCHC RBC AUTO-ENTMCNC: 31.5 G/DL (ref 31.5–35.7)
MCV RBC AUTO: 94.7 FL (ref 79–97)
PATH REPORT.FINAL DX SPEC: NORMAL
PATH REPORT.GROSS SPEC: NORMAL
PLATELET # BLD AUTO: 280 10*3/MM3 (ref 140–450)
PMV BLD AUTO: 10.1 FL (ref 6–12)
RBC # BLD AUTO: 3.56 10*6/MM3 (ref 4.14–5.8)
WBC NRBC COR # BLD: 16.3 10*3/MM3 (ref 3.4–10.8)

## 2019-03-18 PROCEDURE — 25010000002 ONDANSETRON PER 1 MG: Performed by: SURGERY

## 2019-03-18 PROCEDURE — 82150 ASSAY OF AMYLASE: CPT | Performed by: SURGERY

## 2019-03-18 PROCEDURE — 82962 GLUCOSE BLOOD TEST: CPT

## 2019-03-18 PROCEDURE — 25010000002 KETOROLAC TROMETHAMINE PER 15 MG: Performed by: SURGERY

## 2019-03-18 PROCEDURE — 99024 POSTOP FOLLOW-UP VISIT: CPT | Performed by: SURGERY

## 2019-03-18 PROCEDURE — 85027 COMPLETE CBC AUTOMATED: CPT | Performed by: SURGERY

## 2019-03-18 RX ORDER — OXYCODONE HYDROCHLORIDE AND ACETAMINOPHEN 5; 325 MG/1; MG/1
1 TABLET ORAL EVERY 4 HOURS PRN
Qty: 20 TABLET | Refills: 0 | Status: SHIPPED | OUTPATIENT
Start: 2019-03-18 | End: 2019-03-25

## 2019-03-18 RX ADMIN — GLIPIZIDE 2.5 MG: 5 TABLET ORAL at 07:02

## 2019-03-18 RX ADMIN — KETOROLAC TROMETHAMINE 15 MG: 15 INJECTION, SOLUTION INTRAMUSCULAR; INTRAVENOUS at 03:01

## 2019-03-18 RX ADMIN — ONDANSETRON HYDROCHLORIDE 4 MG: 2 SOLUTION INTRAMUSCULAR; INTRAVENOUS at 07:00

## 2019-03-18 RX ADMIN — KETOROLAC TROMETHAMINE 15 MG: 15 INJECTION, SOLUTION INTRAMUSCULAR; INTRAVENOUS at 07:57

## 2019-03-18 RX ADMIN — METOPROLOL TARTRATE 25 MG: 25 TABLET ORAL at 09:26

## 2019-03-18 RX ADMIN — LOSARTAN POTASSIUM: 50 TABLET, FILM COATED ORAL at 09:26

## 2019-03-18 RX ADMIN — PANTOPRAZOLE SODIUM 40 MG: 40 TABLET, DELAYED RELEASE ORAL at 07:00

## 2019-03-18 NOTE — DISCHARGE SUMMARY
DATE OF ADMISSION:  3/15/2019  DATE OF DISCHARGE:  3/18/2019    ATTENDING:        Gm Sanders M.D.       GENERAL SURGERY    PRIMARY CARE PHYSICIAN: Feli Slade MD     CONSULTS:    None    PRINCIPAL DIAGNOSIS:     Pancreatic mass    DISCHARGE DIAGNOSES:     Diabetes  Hypertension    HOSPITAL PROCEDURES: Laparoscopic distal pancreatectomy and splenectomy performed on 3/15/2019 by Dr. Gm Sanders.    HOSPITALCOURSE:   Patient is a 73-year-old man admitted to the hospital last month with pancreatitis.  CT imaging showed a pancreatic mass involving the tail of the gland.  Following resolution of the inflammatory changes, he was scheduled for laparoscopic distal pancreatectomy and splenectomy.  Immunizations for encapsulated organisms were given several weeks before surgery.  His operation was performed on 3/15/2019.  The procedure was uncomplicated.  His postoperative course was essentially unremarkable.  He experienced rapid return of GI function.  Postoperative pain has responded well to oral medications.  He has a drainage catheter in the resection bed that has been evacuating fluid on the order of 10-65 mL per day over the last 2 days.  Amylase levels within the drainage fluid were checked and are high, so the drainage tube has been left in place for now.  He is being instructed on how to reconstitute the drain and measure the effluent.  He is instructed to record the effluent volume daily.    He is given a prescription for Percocet for postoperative pain control.    He has not required changes to insulin or oral hypoglycemic agents.  He has not required enzymatic supplementation.  I will see him in the office next week.  I want him to bring the record of drain volumes to his office visit.  I want to have a CT of the abdomen and pelvis performed before his office visit so that I may decide about removing the drain.  Pathology is pending at the time of this dictation.    ACTIVITY:  Do not shower  until the drain is removed.  Ambulate and climb stairs as tolerated.  No lifting over 10 lbs for 2 weeks.  Okay to drive if not taking pain medications.    DIET:  Resume usual diet    FOLLOW UP:  With Dr Sanders in 1 week. He is instructed to call [phone] for an appointment.

## 2019-03-19 ENCOUNTER — READMISSION MANAGEMENT (OUTPATIENT)
Dept: CALL CENTER | Facility: HOSPITAL | Age: 74
End: 2019-03-19

## 2019-03-19 NOTE — OUTREACH NOTE
Prep Survey      Responses   Facility patient discharged from?  [facility]   Is patient eligible?  Yes   Discharge diagnosis   Laparoscopic distal pancreatectomy and splenectomy    Does the patient have one of the following disease processes/diagnoses(primary or secondary)?  General Surgery   Does the patient have Home health ordered?  No   Is there a DME ordered?  No   Prep survey completed?  Yes          Nasrin Henson RN

## 2019-03-20 ENCOUNTER — READMISSION MANAGEMENT (OUTPATIENT)
Dept: CALL CENTER | Facility: HOSPITAL | Age: 74
End: 2019-03-20

## 2019-03-20 NOTE — OUTREACH NOTE
General Surgery Week 1 Survey      Responses   Facility patient discharged from?  [facility]   Does the patient have one of the following disease processes/diagnoses(primary or secondary)?  General Surgery   Is there a successful TCM telephone encounter documented?  No   Week 1 attempt successful?  Yes   Call start time  1239   Call end time  1246   Discharge diagnosis   Laparoscopic distal pancreatectomy and splenectomy    Meds reviewed with patient/caregiver?  Yes   Is the patient having any side effects they believe may be caused by any medication additions or changes?  No   Does the patient have all medications related to this admission filled (includes all antibiotics, pain medications, etc.)  Yes   Is the patient taking all medications as directed (includes completed medication regime)?  Yes   Does the patient have a follow up appointment scheduled with their surgeon?  Yes   Has the patient kept scheduled appointments due by today?  N/A   Has home health visited the patient within 72 hours of discharge?  N/A   Psychosocial issues?  No   Comments  states incisions healing well, has RASHAUN drain, draining 3cc/day, wife supportive   Did the patient receive a copy of their discharge instructions?  Yes   Nursing interventions  Reviewed instructions with patient   What is the patient's perception of their health status since discharge?  Improving   Nursing interventions  Nurse provided patient education   Is the patient /caregiver able to teach back basic post-op care?  Continue use of incentive spirometry at least 1 week post discharge, Practice 'cough and deep breath', Drive as instructed by MD in discharge instructions, Take showers only when approved by MD-sponge bathe until then, No tub bath, swimming, or hot tub until instructed by MD, Keep incision areas clean,dry and protected, Lifting as instructed by MD in discharge instructions, Do not remove steri-strips   Is the patient/caregiver able to teach back signs  and symptoms of incisional infection?  Increased redness, swelling or pain at the incisonal site, Increased drainage or bleeding, Incisional warmth, Pus or odor from incision, Fever   Is the patient/caregiver able to teach back steps to recovery at home?  Set small, achievable goals for return to baseline health, Rest and rebuild strength, gradually increase activity   Is the patient/caregiver able to teach back the hierarchy of who to call/visit for symptoms/problems? PCP, Specialist, Home health nurse, Urgent Care, ED, 911  Yes   Week 1 call completed?  Yes          Mariah Pagan RN

## 2019-03-27 ENCOUNTER — HOSPITAL ENCOUNTER (OUTPATIENT)
Facility: HOSPITAL | Age: 74
Setting detail: OBSERVATION
Discharge: HOME | End: 2019-03-28
Attending: SURGERY | Admitting: SURGERY

## 2019-03-27 ENCOUNTER — READMISSION MANAGEMENT (OUTPATIENT)
Dept: CALL CENTER | Facility: HOSPITAL | Age: 74
End: 2019-03-27

## 2019-03-27 ENCOUNTER — HOSPITAL ENCOUNTER (OUTPATIENT)
Dept: CT IMAGING | Facility: HOSPITAL | Age: 74
Discharge: HOME | End: 2019-03-27

## 2019-03-27 ENCOUNTER — OFFICE VISIT (OUTPATIENT)
Dept: SURGERY | Facility: CLINIC | Age: 74
End: 2019-03-27

## 2019-03-27 VITALS — OXYGEN SATURATION: 99 % | HEART RATE: 112 BPM | BODY MASS INDEX: 30.7 KG/M2 | WEIGHT: 196 LBS

## 2019-03-27 DIAGNOSIS — K86.89 PANCREATIC MASS: ICD-10-CM

## 2019-03-27 DIAGNOSIS — K86.89 PANCREATIC MASS: Primary | ICD-10-CM

## 2019-03-27 PROBLEM — R11.0 NAUSEA: Status: ACTIVE | Noted: 2019-03-27

## 2019-03-27 LAB
ALBUMIN SERPL-MCNC: 3.6 G/DL (ref 3.5–5.2)
ALBUMIN/GLOB SERPL: 0.9 G/DL
ALP SERPL-CCNC: 148 U/L (ref 39–117)
ALT SERPL W P-5'-P-CCNC: 14 U/L (ref 1–41)
AMYLASE SERPL-CCNC: 50 U/L (ref 28–100)
ANION GAP SERPL CALCULATED.3IONS-SCNC: 15.8 MMOL/L
AST SERPL-CCNC: 13 U/L (ref 1–40)
BILIRUB SERPL-MCNC: 0.2 MG/DL (ref 0.2–1.2)
BUN BLD-MCNC: 12 MG/DL (ref 8–23)
BUN/CREAT SERPL: 11.7 (ref 7–25)
CALCIUM SPEC-SCNC: 9.1 MG/DL (ref 8.6–10.5)
CHLORIDE SERPL-SCNC: 92 MMOL/L (ref 98–107)
CO2 SERPL-SCNC: 26.2 MMOL/L (ref 22–29)
CREAT BLD-MCNC: 1.03 MG/DL (ref 0.76–1.27)
CREAT BLDA-MCNC: 1.1 MG/DL (ref 0.6–1.3)
DEPRECATED RDW RBC AUTO: 48.5 FL (ref 37–54)
ERYTHROCYTE [DISTWIDTH] IN BLOOD BY AUTOMATED COUNT: 14 % (ref 12.3–15.4)
GFR SERPL CREATININE-BSD FRML MDRD: 71 ML/MIN/1.73
GLOBULIN UR ELPH-MCNC: 3.9 GM/DL
GLUCOSE BLD-MCNC: 147 MG/DL (ref 65–99)
GLUCOSE BLDC GLUCOMTR-MCNC: 106 MG/DL (ref 70–130)
GLUCOSE BLDC GLUCOMTR-MCNC: 141 MG/DL (ref 70–130)
HCT VFR BLD AUTO: 36.6 % (ref 37.5–51)
HGB BLD-MCNC: 11.1 G/DL (ref 13–17.7)
MCH RBC QN AUTO: 28.7 PG (ref 26.6–33)
MCHC RBC AUTO-ENTMCNC: 30.3 G/DL (ref 31.5–35.7)
MCV RBC AUTO: 94.6 FL (ref 79–97)
PLAT MORPH BLD: NORMAL
PLATELET # BLD AUTO: 1028 10*3/MM3 (ref 140–450)
PMV BLD AUTO: 9.5 FL (ref 6–12)
POTASSIUM BLD-SCNC: 3.7 MMOL/L (ref 3.5–5.2)
PROCALCITONIN SERPL-MCNC: 0.17 NG/ML (ref 0.1–0.25)
PROT SERPL-MCNC: 7.5 G/DL (ref 6–8.5)
RBC # BLD AUTO: 3.87 10*6/MM3 (ref 4.14–5.8)
RBC MORPH BLD: NORMAL
SODIUM BLD-SCNC: 134 MMOL/L (ref 136–145)
WBC MORPH BLD: NORMAL
WBC NRBC COR # BLD: 19.98 10*3/MM3 (ref 3.4–10.8)

## 2019-03-27 PROCEDURE — 84145 PROCALCITONIN (PCT): CPT | Performed by: SURGERY

## 2019-03-27 PROCEDURE — G0379 DIRECT REFER HOSPITAL OBSERV: HCPCS

## 2019-03-27 PROCEDURE — 25010000002 KETOROLAC TROMETHAMINE PER 15 MG: Performed by: SURGERY

## 2019-03-27 PROCEDURE — G0378 HOSPITAL OBSERVATION PER HR: HCPCS

## 2019-03-27 PROCEDURE — 96375 TX/PRO/DX INJ NEW DRUG ADDON: CPT

## 2019-03-27 PROCEDURE — 74177 CT ABD & PELVIS W/CONTRAST: CPT

## 2019-03-27 PROCEDURE — 80053 COMPREHEN METABOLIC PANEL: CPT | Performed by: SURGERY

## 2019-03-27 PROCEDURE — 82565 ASSAY OF CREATININE: CPT

## 2019-03-27 PROCEDURE — 82962 GLUCOSE BLOOD TEST: CPT

## 2019-03-27 PROCEDURE — 85025 COMPLETE CBC W/AUTO DIFF WBC: CPT | Performed by: SURGERY

## 2019-03-27 PROCEDURE — 85007 BL SMEAR W/DIFF WBC COUNT: CPT | Performed by: SURGERY

## 2019-03-27 PROCEDURE — 25010000002 IOPAMIDOL 61 % SOLUTION: Performed by: SURGERY

## 2019-03-27 PROCEDURE — 99024 POSTOP FOLLOW-UP VISIT: CPT | Performed by: SURGERY

## 2019-03-27 PROCEDURE — 96361 HYDRATE IV INFUSION ADD-ON: CPT

## 2019-03-27 PROCEDURE — 25010000002 HYDROMORPHONE PER 4 MG: Performed by: SURGERY

## 2019-03-27 PROCEDURE — 96374 THER/PROPH/DIAG INJ IV PUSH: CPT

## 2019-03-27 PROCEDURE — 82150 ASSAY OF AMYLASE: CPT | Performed by: SURGERY

## 2019-03-27 PROCEDURE — 96376 TX/PRO/DX INJ SAME DRUG ADON: CPT

## 2019-03-27 RX ORDER — KETOROLAC TROMETHAMINE 30 MG/ML
30 INJECTION, SOLUTION INTRAMUSCULAR; INTRAVENOUS ONCE
Status: COMPLETED | OUTPATIENT
Start: 2019-03-27 | End: 2019-03-27

## 2019-03-27 RX ORDER — FAMOTIDINE 10 MG/ML
20 INJECTION, SOLUTION INTRAVENOUS EVERY 12 HOURS SCHEDULED
Status: DISCONTINUED | OUTPATIENT
Start: 2019-03-27 | End: 2019-03-28 | Stop reason: HOSPADM

## 2019-03-27 RX ORDER — ONDANSETRON 2 MG/ML
4 INJECTION INTRAMUSCULAR; INTRAVENOUS EVERY 6 HOURS PRN
Status: DISCONTINUED | OUTPATIENT
Start: 2019-03-27 | End: 2019-03-28 | Stop reason: HOSPADM

## 2019-03-27 RX ORDER — SODIUM CHLORIDE 0.9 % (FLUSH) 0.9 %
3-10 SYRINGE (ML) INJECTION AS NEEDED
Status: DISCONTINUED | OUTPATIENT
Start: 2019-03-27 | End: 2019-03-28 | Stop reason: HOSPADM

## 2019-03-27 RX ORDER — DEXTROSE MONOHYDRATE 25 G/50ML
25 INJECTION, SOLUTION INTRAVENOUS
Status: DISCONTINUED | OUTPATIENT
Start: 2019-03-27 | End: 2019-03-28 | Stop reason: HOSPADM

## 2019-03-27 RX ORDER — NALOXONE HCL 0.4 MG/ML
0.4 VIAL (ML) INJECTION
Status: DISCONTINUED | OUTPATIENT
Start: 2019-03-27 | End: 2019-03-28 | Stop reason: HOSPADM

## 2019-03-27 RX ORDER — SODIUM CHLORIDE 9 MG/ML
100 INJECTION, SOLUTION INTRAVENOUS CONTINUOUS
Status: DISCONTINUED | OUTPATIENT
Start: 2019-03-27 | End: 2019-03-28 | Stop reason: HOSPADM

## 2019-03-27 RX ORDER — NICOTINE POLACRILEX 4 MG
15 LOZENGE BUCCAL
Status: DISCONTINUED | OUTPATIENT
Start: 2019-03-27 | End: 2019-03-28 | Stop reason: HOSPADM

## 2019-03-27 RX ORDER — PROMETHAZINE HYDROCHLORIDE 25 MG/ML
12.5 INJECTION, SOLUTION INTRAMUSCULAR; INTRAVENOUS EVERY 6 HOURS PRN
Status: DISCONTINUED | OUTPATIENT
Start: 2019-03-27 | End: 2019-03-28 | Stop reason: HOSPADM

## 2019-03-27 RX ORDER — HYDROMORPHONE HYDROCHLORIDE 1 MG/ML
0.5 INJECTION, SOLUTION INTRAMUSCULAR; INTRAVENOUS; SUBCUTANEOUS
Status: DISCONTINUED | OUTPATIENT
Start: 2019-03-27 | End: 2019-03-28 | Stop reason: HOSPADM

## 2019-03-27 RX ORDER — SODIUM CHLORIDE 0.9 % (FLUSH) 0.9 %
3 SYRINGE (ML) INJECTION EVERY 12 HOURS SCHEDULED
Status: DISCONTINUED | OUTPATIENT
Start: 2019-03-27 | End: 2019-03-28 | Stop reason: HOSPADM

## 2019-03-27 RX ORDER — HYDROCODONE BITARTRATE AND ACETAMINOPHEN 5; 325 MG/1; MG/1
1 TABLET ORAL EVERY 4 HOURS PRN
Status: DISCONTINUED | OUTPATIENT
Start: 2019-03-27 | End: 2019-03-28 | Stop reason: HOSPADM

## 2019-03-27 RX ADMIN — IOPAMIDOL 85 ML: 612 INJECTION, SOLUTION INTRAVENOUS at 12:21

## 2019-03-27 RX ADMIN — HYDROMORPHONE HYDROCHLORIDE 0.5 MG: 1 INJECTION, SOLUTION INTRAMUSCULAR; INTRAVENOUS; SUBCUTANEOUS at 19:38

## 2019-03-27 RX ADMIN — KETOROLAC TROMETHAMINE 30 MG: 30 INJECTION, SOLUTION INTRAMUSCULAR at 21:07

## 2019-03-27 RX ADMIN — FAMOTIDINE 20 MG: 10 INJECTION INTRAVENOUS at 21:07

## 2019-03-27 RX ADMIN — SODIUM CHLORIDE 100 ML/HR: 9 INJECTION, SOLUTION INTRAVENOUS at 18:10

## 2019-03-27 RX ADMIN — HYDROMORPHONE HYDROCHLORIDE 0.5 MG: 1 INJECTION, SOLUTION INTRAMUSCULAR; INTRAVENOUS; SUBCUTANEOUS at 17:34

## 2019-03-27 NOTE — OUTREACH NOTE
General Surgery Week 2 Survey      Responses   Facility patient discharged from?  [facility]   Does the patient have one of the following disease processes/diagnoses(primary or secondary)?  General Surgery   Week 2 attempt successful?  No   Revoke  Readmitted [pt in process of readmission to [facility]]          Mariah Pagan RN

## 2019-03-27 NOTE — PROGRESS NOTES
CHIEF COMPLAINT:    Follow-up from distal pancreatectomy/splenectomy    HISTORY OF PRESENT ILLNESS:    Juan J Johnson Jr. is a 73 y.o. 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  Pathology showed chronic inflammatory changes consistent with pancreatitis.  He was discharged after a 4-day hospitalization.  A fluted drain placed in the resection bed was evacuating on the order of 80 mL/day prior to discharge.  Amylase in the fluid was greater than 1300, so I left the drain in place.  Since discharge from the hospital, he has had several episodes of nausea.  He has been checking his blood sugar daily.  He has not had any low or excessively high readings.  Bowel movements have been formed and brown.    Today he had a CT scan of the abdomen and pelvis ordered prior to his office follow-up.  He became very nauseated after the scan was completed.  After the CT, he went to [employer] for a fish sandwich.  After that, he went home and laid down for a couple hours until his office appointment.  He arrived with a list of his daily drain outputs.  It has been less than 20 mL/day.    Past Medical History:   Diagnosis Date   • Arthritis    • Bilateral kidney stones    • Colon polyp 09/20/2017    POLYPS REMOVED DURING SCOPE   • Diabetes mellitus type 2, noninsulin dependent (CMS/HCC)    • GERD (gastroesophageal reflux disease)    • High cholesterol    • History of pancreatitis 02/2019   • Hypertension    • Pancreatic mass    • Sleep apnea     NO CPAP CURRENTLY - WORKING WITH DR BELL    • Splenic vein thrombosis     POSSIBLE? - RECENT CT SCAN RESOLVED, OFF BLOOD THINNERS        Past Surgical History:   Procedure Laterality Date   • CYSTOSCOPY W/ URETERAL STENT PLACEMENT Left 2/11/2019    Procedure: CYSTOSCOPY URETERAL CATHETER/STENT INSERTION;  Surgeon: Adonis Simpson MD;  Location: [facility];  Service: Urology   • ENDOSCOPY AND COLONOSCOPY N/A  2017    [facility]    • EXTRACORPOREAL SHOCK WAVE LITHOTRIPSY (ESWL) N/A 2014, 2/2019   • FINGER/THUMB CLOSED REDUCTION AND PERCUTANEOUS PINNING Left 1982    left thumb   • PANCREATECTOMY N/A 3/15/2019    Procedure: PANCREATIC RESECTION LAPAROSCOPIC AND SPLEENECTOMY;  Surgeon: Gm Sanders MD;  Location: [facility] MAIN OR;  Service: General   • SINUS SURGERY N/A 1970s   • TOTAL HIP ARTHROPLASTY Left 6/13/2016    Procedure: LT TOTAL HIP ARTHROPLASTY ANTERIOR;  Surgeon: Sandor Phillips MD;  Location: [facility] MAIN OR;  Service:    • TOTAL HIP ARTHROPLASTY Right 7/20/2016    Procedure: RT TOTAL HIP ARTHROPLASTY ANTERIOR WITH HANA TABLE;  Surgeon: Sandor Phillips MD;  Location: [facility] MAIN OR;  Service:          Current Outpatient Medications:   •  atorvastatin (LIPITOR) 40 MG tablet, Take 40 mg by mouth every evening., Disp: , Rfl:   •  glimepiride (AMARYL) 1 MG tablet, Take 1 mg by mouth Every Morning Before Breakfast., Disp: , Rfl:   •  losartan-hydrochlorothiazide (HYZAAR) 100-25 MG per tablet, Take 1 tablet by mouth Daily., Disp: , Rfl:   •  metFORMIN (GLUCOPHAGE) 1000 MG tablet, Take 1,000 mg by mouth 2 (two) times a day with meals., Disp: , Rfl:   •  metoprolol tartrate (LOPRESSOR) 25 MG tablet, Take 25 mg by mouth 2 (Two) Times a Day., Disp: , Rfl:   •  montelukast (SINGULAIR) 10 MG tablet, Take 10 mg by mouth Every Night., Disp: , Rfl:   •  Multiple Vitamins-Minerals (MULTIVITAMIN PO), Take 1 tablet by mouth Daily. HELD FOR OR, Disp: , Rfl:   •  Omega-3 Fatty Acids (OMEGA 3 PO), Take 1 tablet by mouth Daily. HELD FOR OR, Disp: , Rfl:   •  omeprazole (PriLOSEC) 40 MG capsule, Take 40 mg by mouth every morning., Disp: , Rfl:   •  tamsulosin (FLOMAX) 0.4 MG capsule 24 hr capsule, Take 1 capsule by mouth Every Night., Disp: , Rfl:   •  valsartan-hydrochlorothiazide (DIOVAN-HCT) 160-12.5 MG per tablet, Take 1 tablet by mouth every evening., Disp: , Rfl:   No current facility-administered  medications for this visit.     Facility-Administered Medications Ordered in Other Visits:   •  dextrose (D50W) 25 g/ 50mL Intravenous Solution 25 g, 25 g, Intravenous, Q15 Min PRN, Gm Sanders MD  •  dextrose (GLUTOSE) oral gel 15 g, 15 g, Oral, Q15 Min PRN, Gm Sanders MD  •  famotidine (PEPCID) injection 20 mg, 20 mg, Intravenous, Q12H, Gm Sanders MD  •  glucagon (human recombinant) (GLUCAGEN DIAGNOSTIC) injection 1 mg, 1 mg, Subcutaneous, PRN, Gm Sanders MD  •  HYDROmorphone (DILAUDID) injection 0.5 mg, 0.5 mg, Intravenous, Q2H PRN **AND** naloxone (NARCAN) injection 0.4 mg, 0.4 mg, Intravenous, Q5 Min PRN, Gm Sanders MD  •  insulin lispro (humaLOG) injection 0-7 Units, 0-7 Units, Subcutaneous, 4x Daily With Meals & Nightly, Gm Sanders MD  •  ondansetron (ZOFRAN) injection 4 mg, 4 mg, Intravenous, Q6H PRN, Gm Sanders MD  •  promethazine (PHENERGAN) injection 12.5 mg, 12.5 mg, Intravenous, Q6H PRN, Gm Sanders MD  •  sodium chloride 0.9 % flush 3 mL, 3 mL, Intravenous, Q12H, Gm Sanders MD  •  sodium chloride 0.9 % flush 3-10 mL, 3-10 mL, Intravenous, PRN, Gm Sanders MD  •  sodium chloride 0.9 % infusion, 100 mL/hr, Intravenous, Continuous, Gm Sanders MD    Allergies   Allergen Reactions   • Robaxin [Methocarbamol] Rash       Family History   Problem Relation Age of Onset   • Diabetes Maternal Grandfather    • Diabetes Mother    • Hypertension Mother    • Diabetes Sister    • Hypertension Sister    • Malig Hyperthermia Neg Hx        Social History     Socioeconomic History   • Marital status:      Spouse name: Not on file   • Number of children: Not on file   • Years of education: Not on file   • Highest education level: Not on file   Tobacco Use   • Smoking status: Never Smoker   • Smokeless tobacco: Never Used   Substance and Sexual Activity   • Alcohol use: Yes     Alcohol/week: 0.6 oz     Types: 1  Cans of beer per week     Comment: 1 drink a week   • Drug use: No   • Sexual activity: Yes     Partners: Female     Birth control/protection: None       Review of Systems   Constitutional: Positive for diaphoresis and fatigue.   HENT: Negative for trouble swallowing.    Respiratory: Positive for shortness of breath. Negative for chest tightness.    Cardiovascular: Negative for chest pain.   Gastrointestinal: Positive for nausea. Negative for diarrhea.   Endocrine: Negative for polyuria.   Genitourinary: Negative for dysuria.   Skin: Positive for pallor.   Neurological: Positive for dizziness.   Psychiatric/Behavioral: Negative for confusion. The patient is nervous/anxious.        Objective     Pulse 112   Wt 88.9 kg (196 lb)   SpO2 99%   BMI 30.70 kg/m²     Physical Exam   Constitutional: He is oriented to person, place, and time. He appears well-developed and well-nourished. No distress.   HENT:   Head: Normocephalic and atraumatic.   Eyes: Conjunctivae are normal. No scleral icterus.   Neck: Neck supple. No tracheal deviation present.   Cardiovascular: Normal rate, regular rhythm, normal heart sounds and intact distal pulses.   No murmur heard.  Pulmonary/Chest: Effort normal and breath sounds normal. No respiratory distress. He has no wheezes. He has no rales.   Abdominal: Soft. Bowel sounds are normal. He exhibits no distension. There is no tenderness. There is no guarding.   Musculoskeletal: He exhibits no edema or deformity.   Neurological: He is alert and oriented to person, place, and time.   Skin: Skin is warm. He is diaphoretic.   Psychiatric:   Anxious appearing.   Vitals reviewed.      DIAGNOSTIC DATA:    Reviewed CT images.  The dictated report is not available.  There is minimal fluid in the upper abdomen.  There is no pelvic fluid.  There no evidence of bowel obstruction.  There is trace pleural fluid on the left    Past chronic inflammatory changes in the pancreas consistent with chronic  pancreatitis.  Spleen is benign.    ASSESSMENT:    Status post laparoscopic distal pancreatectomy and splenectomy with distal pancreas mass 3/15/2019.  Mild tachycardia    PLAN:    I removed the drain today in the office.  Was unable to maintain seated position.  He was taken to the restroom and vomited in the commode.  I will admit him to the hospital  He needs to be rehydrated with IV fluids.  I will check baseline labs and pancreatic enzymes.  I will follow-up on the CT over-read by the radiologist.

## 2019-03-28 VITALS
TEMPERATURE: 98.1 F | SYSTOLIC BLOOD PRESSURE: 126 MMHG | HEART RATE: 108 BPM | RESPIRATION RATE: 16 BRPM | DIASTOLIC BLOOD PRESSURE: 68 MMHG | OXYGEN SATURATION: 95 %

## 2019-03-28 LAB
ALBUMIN SERPL-MCNC: 3.5 G/DL (ref 3.5–5.2)
ALBUMIN/GLOB SERPL: 1 G/DL
ALP SERPL-CCNC: 134 U/L (ref 39–117)
ALT SERPL W P-5'-P-CCNC: 11 U/L (ref 1–41)
AMYLASE SERPL-CCNC: 41 U/L (ref 28–100)
ANION GAP SERPL CALCULATED.3IONS-SCNC: 14.5 MMOL/L
AST SERPL-CCNC: 10 U/L (ref 1–40)
BILIRUB SERPL-MCNC: 0.3 MG/DL (ref 0.2–1.2)
BUN BLD-MCNC: 10 MG/DL (ref 8–23)
BUN/CREAT SERPL: 12.7 (ref 7–25)
CALCIUM SPEC-SCNC: 8.9 MG/DL (ref 8.6–10.5)
CHLORIDE SERPL-SCNC: 93 MMOL/L (ref 98–107)
CO2 SERPL-SCNC: 26.5 MMOL/L (ref 22–29)
CREAT BLD-MCNC: 0.79 MG/DL (ref 0.76–1.27)
DEPRECATED RDW RBC AUTO: 49.1 FL (ref 37–54)
ERYTHROCYTE [DISTWIDTH] IN BLOOD BY AUTOMATED COUNT: 14.1 % (ref 12.3–15.4)
GFR SERPL CREATININE-BSD FRML MDRD: 97 ML/MIN/1.73
GLOBULIN UR ELPH-MCNC: 3.4 GM/DL
GLUCOSE BLD-MCNC: 121 MG/DL (ref 65–99)
GLUCOSE BLDC GLUCOMTR-MCNC: 122 MG/DL (ref 70–130)
GLUCOSE BLDC GLUCOMTR-MCNC: 205 MG/DL (ref 70–130)
HCT VFR BLD AUTO: 33 % (ref 37.5–51)
HGB BLD-MCNC: 10.1 G/DL (ref 13–17.7)
MCH RBC QN AUTO: 29.1 PG (ref 26.6–33)
MCHC RBC AUTO-ENTMCNC: 30.6 G/DL (ref 31.5–35.7)
MCV RBC AUTO: 95.1 FL (ref 79–97)
PLATELET # BLD AUTO: 1021 10*3/MM3 (ref 140–450)
PMV BLD AUTO: 9.3 FL (ref 6–12)
POTASSIUM BLD-SCNC: 3.8 MMOL/L (ref 3.5–5.2)
PROT SERPL-MCNC: 6.9 G/DL (ref 6–8.5)
RBC # BLD AUTO: 3.47 10*6/MM3 (ref 4.14–5.8)
SODIUM BLD-SCNC: 134 MMOL/L (ref 136–145)
WBC NRBC COR # BLD: 13.99 10*3/MM3 (ref 3.4–10.8)

## 2019-03-28 PROCEDURE — 25010000002 KETOROLAC TROMETHAMINE PER 15 MG: Performed by: SURGERY

## 2019-03-28 PROCEDURE — 82150 ASSAY OF AMYLASE: CPT | Performed by: SURGERY

## 2019-03-28 PROCEDURE — 63710000001 INSULIN LISPRO (HUMAN) PER 5 UNITS: Performed by: SURGERY

## 2019-03-28 PROCEDURE — 99024 POSTOP FOLLOW-UP VISIT: CPT | Performed by: SURGERY

## 2019-03-28 PROCEDURE — 85027 COMPLETE CBC AUTOMATED: CPT | Performed by: SURGERY

## 2019-03-28 PROCEDURE — 96361 HYDRATE IV INFUSION ADD-ON: CPT

## 2019-03-28 PROCEDURE — 80053 COMPREHEN METABOLIC PANEL: CPT | Performed by: SURGERY

## 2019-03-28 PROCEDURE — 96376 TX/PRO/DX INJ SAME DRUG ADON: CPT

## 2019-03-28 PROCEDURE — 82962 GLUCOSE BLOOD TEST: CPT

## 2019-03-28 PROCEDURE — G0378 HOSPITAL OBSERVATION PER HR: HCPCS

## 2019-03-28 RX ORDER — KETOROLAC TROMETHAMINE 30 MG/ML
15 INJECTION, SOLUTION INTRAMUSCULAR; INTRAVENOUS ONCE
Status: COMPLETED | OUTPATIENT
Start: 2019-03-28 | End: 2019-03-28

## 2019-03-28 RX ORDER — ASPIRIN 81 MG/1
81 TABLET ORAL DAILY
Qty: 30 TABLET | Refills: 11 | Status: SHIPPED | OUTPATIENT
Start: 2019-03-28 | End: 2020-03-27

## 2019-03-28 RX ADMIN — SODIUM CHLORIDE, PRESERVATIVE FREE 3 ML: 5 INJECTION INTRAVENOUS at 08:38

## 2019-03-28 RX ADMIN — KETOROLAC TROMETHAMINE 15 MG: 30 INJECTION, SOLUTION INTRAMUSCULAR; INTRAVENOUS at 11:54

## 2019-03-28 RX ADMIN — INSULIN LISPRO 3 UNITS: 100 INJECTION, SOLUTION INTRAVENOUS; SUBCUTANEOUS at 11:54

## 2019-03-28 RX ADMIN — HYDROCODONE BITARTRATE AND ACETAMINOPHEN 1 TABLET: 5; 325 TABLET ORAL at 09:43

## 2019-03-28 RX ADMIN — FAMOTIDINE 20 MG: 10 INJECTION INTRAVENOUS at 08:37

## 2019-03-28 RX ADMIN — SODIUM CHLORIDE 100 ML/HR: 9 INJECTION, SOLUTION INTRAVENOUS at 04:37

## 2019-04-10 ENCOUNTER — OFFICE VISIT (OUTPATIENT)
Dept: SURGERY | Facility: CLINIC | Age: 74
End: 2019-04-10

## 2019-04-10 VITALS — OXYGEN SATURATION: 94 % | WEIGHT: 190 LBS | HEIGHT: 67 IN | HEART RATE: 101 BPM | BODY MASS INDEX: 29.82 KG/M2

## 2019-04-10 DIAGNOSIS — R89.9 ABNORMAL PLEURAL FLUID: Primary | ICD-10-CM

## 2019-04-10 PROCEDURE — 99024 POSTOP FOLLOW-UP VISIT: CPT | Performed by: SURGERY

## 2019-04-17 ENCOUNTER — TELEPHONE (OUTPATIENT)
Dept: SURGERY | Facility: CLINIC | Age: 74
End: 2019-04-17

## 2019-04-17 NOTE — TELEPHONE ENCOUNTER
Patient would like to have a CT CHEST at the same time. Would you be willing to place orders for that?

## 2019-04-19 DIAGNOSIS — J90 PLEURAL EFFUSION: Primary | ICD-10-CM

## 2019-04-23 NOTE — PROGRESS NOTES
CHIEF COMPLAINT:    Follow-up from distal pancreatectomy/splenectomy    HISTORY OF PRESENT ILLNESS:    Juan J Johnson Jr. is a 73 y.o. 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  Pathology showed chronic inflammatory changes consistent with pancreatitis.  He was discharged after a 4-day hospitalization.  I removed the drain at his last office visit.  He looked particularly dehydrated then and I admitted him to the hospital from the office at his last visit.  He improved with overnight rehydration with IV fluids.  Since discharge, he has been tolerating his usual diet without difficulty.  Bowel movements are brown and formed.  There is no nausea or vomiting.    EXAM:    Alert. Oriented.  Lungs: Clear.  Heart: Regular.  Abdomen: Soft.  Nondistended.  Extremities: Warm.    We reviewed the CT images performed prior to his last office visit on 3/27/2019.  There was a left pleural effusion.  There was a subcapsular hematoma of the left kidney.  Follow-up imaging was recommended.    ASSESSMENT:    Status post laparoscopic distal pancreatectomy and splenectomy for a distal pancreas mass 3/15/2019- path showed chronic inflammation.  Readmission on 3/27/2019 for dehydration.  Pleural fluid on follow-up CT imaging.    PLAN:    We will order CT scan of the abdomen and pelvis to be performed in the next couple weeks for follow-up evaluation of the pleural effusion and left kidney subcapsular hematoma.

## 2019-04-24 ENCOUNTER — HOSPITAL ENCOUNTER (OUTPATIENT)
Dept: CT IMAGING | Facility: HOSPITAL | Age: 74
Discharge: HOME | End: 2019-04-24
Admitting: SURGERY

## 2019-04-24 DIAGNOSIS — R89.9 ABNORMAL PLEURAL FLUID: ICD-10-CM

## 2019-04-24 DIAGNOSIS — D49.0 PANCREATIC NEOPLASM: ICD-10-CM

## 2019-04-24 DIAGNOSIS — J90 PLEURAL EFFUSION: ICD-10-CM

## 2019-04-24 LAB — CREAT BLDA-MCNC: 1.1 MG/DL (ref 0.6–1.3)

## 2019-04-24 PROCEDURE — 82565 ASSAY OF CREATININE: CPT

## 2019-04-24 PROCEDURE — 25010000002 IOPAMIDOL 61 % SOLUTION: Performed by: SURGERY

## 2019-04-24 PROCEDURE — 71260 CT THORAX DX C+: CPT

## 2019-04-24 PROCEDURE — 74177 CT ABD & PELVIS W/CONTRAST: CPT

## 2019-04-24 RX ADMIN — IOPAMIDOL 85 ML: 612 INJECTION, SOLUTION INTRAVENOUS at 11:01

## 2019-05-08 ENCOUNTER — OFFICE VISIT (OUTPATIENT)
Dept: SURGERY | Facility: CLINIC | Age: 74
End: 2019-05-08

## 2019-05-08 VITALS — HEART RATE: 88 BPM | BODY MASS INDEX: 29.95 KG/M2 | OXYGEN SATURATION: 99 % | WEIGHT: 191.2 LBS

## 2019-05-08 DIAGNOSIS — K86.89 PANCREATIC MASS: Primary | ICD-10-CM

## 2019-05-08 PROCEDURE — 99024 POSTOP FOLLOW-UP VISIT: CPT | Performed by: SURGERY

## 2019-05-09 NOTE — PROGRESS NOTES
CHIEF COMPLAINT:    Follow-up from distal pancreatectomy/splenectomy    HISTORY OF PRESENT ILLNESS:    Juan J Johnson Jr. is a 73 y.o. 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  Pathology showed chronic inflammatory changes consistent with pancreatitis.  Bowel movements are brown and formed.  There is no nausea or vomiting.  He checks his blood sugar daily.  This morning it was 105. He feels well.    EXAM:    Alert. Oriented.  Lungs: Clear.  Heart: Regular.  Abdomen: Soft.  Nondistended.  Extremities: Warm.    We reviewed the CT imaging study performed on 4/24/2019 together in the office.  There is improvement in the left-sided pleural effusion.  There is left upper quadrant inflammatory change.    ASSESSMENT:    Status post laparoscopic distal pancreatectomy and splenectomy for a distal pancreas mass 3/15/2019- path showed chronic inflammation.  Readmission on 3/27/2019 for dehydration.  Pleural fluid on follow-up CT imaging.    PLAN:    I think the CT imaging study is not much different with regard to the postoperative inflammatory changes in the left upper quadrant and splenic bed and at the tail the pancreas.  There is improvement in the fluid in the left chest.  He looks and feels well.  I have not recommended any dietary or activity changes or restrictions.  I would like to see him back in the office in 3 months to repeat the CT imaging study.

## 2019-08-13 ENCOUNTER — OFFICE VISIT (OUTPATIENT)
Dept: SURGERY | Facility: CLINIC | Age: 74
End: 2019-08-13

## 2019-08-13 VITALS — HEART RATE: 83 BPM | OXYGEN SATURATION: 96 % | WEIGHT: 200.2 LBS | BODY MASS INDEX: 31.42 KG/M2 | HEIGHT: 67 IN

## 2019-08-13 DIAGNOSIS — Z90.410 H/O RESECTION OF PANCREAS: Primary | ICD-10-CM

## 2019-08-13 PROCEDURE — 99213 OFFICE O/P EST LOW 20 MIN: CPT | Performed by: SURGERY

## 2019-08-13 NOTE — PROGRESS NOTES
CHIEF COMPLAINT:    Follow-up from distal pancreatectomy/splenectomy    HISTORY OF PRESENT ILLNESS:    Juan J Johnson Jr. is a 73 y.o. 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  The pathology showed chronic inflammatory changes consistent with pancreatitis.  His bowel movements are brown and formed.  There is no nausea or vomiting.  He checks his blood sugar daily.  This morning it was 105. He feels well.  He does not require pancreatic enzyme supplementation.    EXAM:    Alert. Oriented.  Lungs: Clear.  Heart: Regular.  Abdomen: Soft.  Nondistended.  Extremities: Warm.    I reviewed the CT imaging study performed on 4/24/2019 again.  There is improvement in the left-sided pleural effusion.  There is left upper quadrant inflammatory change and a small (17 mm) fluid collection.    ASSESSMENT:    Status post laparoscopic distal pancreatectomy and splenectomy for a distal pancreas mass 3/15/2019- path showed chronic inflammation.    PLAN:    I thi he looks and feels well.  He does not require pancreatic enzyme supplementation.  I have not recommended any dietary or activity changes or restrictions.  I am going to order another CT scan of the abdomen and pelvis to evaluate the left upper quadrant lesion to track its resolution.

## 2019-09-09 ENCOUNTER — HOSPITAL ENCOUNTER (OUTPATIENT)
Dept: CT IMAGING | Facility: HOSPITAL | Age: 74
Discharge: HOME | End: 2019-09-09
Admitting: SURGERY

## 2019-09-09 DIAGNOSIS — Z90.410 H/O RESECTION OF PANCREAS: ICD-10-CM

## 2019-09-09 LAB — CREAT BLDA-MCNC: 0.9 MG/DL (ref 0.6–1.3)

## 2019-09-09 PROCEDURE — 25010000002 IOPAMIDOL 61 % SOLUTION: Performed by: SURGERY

## 2019-09-09 PROCEDURE — 82565 ASSAY OF CREATININE: CPT

## 2019-09-09 PROCEDURE — 74177 CT ABD & PELVIS W/CONTRAST: CPT

## 2019-09-09 RX ADMIN — IOPAMIDOL 85 ML: 612 INJECTION, SOLUTION INTRAVENOUS at 10:21

## 2019-09-30 ENCOUNTER — TELEPHONE (OUTPATIENT)
Dept: SURGERY | Facility: CLINIC | Age: 74
End: 2019-09-30

## 2019-10-01 NOTE — TELEPHONE ENCOUNTER
Called and discussed the CT scan.    He will need follow-up visit to see me February 2020.    He will need a colonoscopy in 2020.

## 2020-02-04 ENCOUNTER — OFFICE VISIT (OUTPATIENT)
Dept: SURGERY | Facility: CLINIC | Age: 75
End: 2020-02-04

## 2020-02-04 VITALS — HEIGHT: 67 IN | BODY MASS INDEX: 32.18 KG/M2 | WEIGHT: 205 LBS | OXYGEN SATURATION: 98 % | HEART RATE: 80 BPM

## 2020-02-04 DIAGNOSIS — Z86.010 HISTORY OF COLON POLYPS: Primary | ICD-10-CM

## 2020-02-04 DIAGNOSIS — Z90.410 HISTORY OF RESECTION OF PANCREAS: ICD-10-CM

## 2020-02-04 PROCEDURE — 99213 OFFICE O/P EST LOW 20 MIN: CPT | Performed by: SURGERY

## 2020-02-04 NOTE — PROGRESS NOTES
CHIEF COMPLAINT:    Follow-up from partial pancreatectomy    HISTORY OF PRESENT ILLNESS:    Juan J Johnson Jr. is a 74 y.o. 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  The pathology showed chronic inflammatory changes consistent with pancreatitis.  His bowel movements are brown and formed.  There is no nausea or vomiting.  He has been imaged previously with CT scan of the abdomen and pelvis to follow postoperative inflammatory changes in the left upper quadrant.    He has a history of colon polyps noted on his last colonoscopy performed on 9/18/2017 at [facility].  I do not have a patholoy report from that procedure.    Past Medical History:   Diagnosis Date   • Arthritis    • Bilateral kidney stones    • Colon polyp 09/20/2017    POLYPS REMOVED DURING SCOPE   • Diabetes mellitus type 2, noninsulin dependent (CMS/HCC)    • GERD (gastroesophageal reflux disease)    • High cholesterol    • History of pancreatitis 02/2019   • Hypertension    • Pancreatic mass    • Sleep apnea     NO CPAP CURRENTLY - WORKING WITH DR BELL    • Splenic vein thrombosis     POSSIBLE? - RECENT CT SCAN RESOLVED, OFF BLOOD THINNERS        Past Surgical History:   Procedure Laterality Date   • CYSTOSCOPY W/ URETERAL STENT PLACEMENT Left 2/11/2019    Procedure: CYSTOSCOPY URETERAL CATHETER/STENT INSERTION;  Surgeon: Adonis Simpson MD;  Location: [facility] OR;  Service: Urology   • ENDOSCOPY AND COLONOSCOPY N/A 2017    [facility]    • EXTRACORPOREAL SHOCK WAVE LITHOTRIPSY (ESWL) N/A 2014, 2/2019   • FINGER/THUMB CLOSED REDUCTION AND PERCUTANEOUS PINNING Left 1982    left thumb   • PANCREATECTOMY N/A 3/15/2019    Procedure: PANCREATIC RESECTION LAPAROSCOPIC AND SPLEENECTOMY;  Surgeon: Gm Sanders MD;  Location: [facility] OR;  Service: General   • SINUS SURGERY N/A 1970s   • TOTAL HIP ARTHROPLASTY Left 6/13/2016    Procedure: LT  TOTAL HIP ARTHROPLASTY ANTERIOR;  Surgeon: Sandor Phillips MD;  Location: [facility] OR;  Service:    • TOTAL HIP ARTHROPLASTY Right 7/20/2016    Procedure: RT TOTAL HIP ARTHROPLASTY ANTERIOR WITH HANA TABLE;  Surgeon: Sandor Phillips MD;  Location: [facility] MAIN OR;  Service:          Current Outpatient Medications:   •  aspirin 81 MG EC tablet, Take 1 tablet by mouth daily., Disp: 30 tablet, Rfl: 11  •  atorvastatin (LIPITOR) 40 MG tablet, Take 40 mg by mouth every evening., Disp: , Rfl:   •  glimepiride (AMARYL) 1 MG tablet, Take 1 mg by mouth Every Morning Before Breakfast., Disp: , Rfl:   •  losartan-hydrochlorothiazide (HYZAAR) 100-25 MG per tablet, Take 1 tablet by mouth Daily., Disp: , Rfl:   •  metFORMIN (GLUCOPHAGE) 1000 MG tablet, Take 1,000 mg by mouth 2 (two) times a day with meals., Disp: , Rfl:   •  metoprolol tartrate (LOPRESSOR) 25 MG tablet, Take 25 mg by mouth 2 (Two) Times a Day., Disp: , Rfl:   •  montelukast (SINGULAIR) 10 MG tablet, Take 10 mg by mouth Every Night., Disp: , Rfl:   •  Multiple Vitamins-Minerals (MULTIVITAMIN PO), Take 1 tablet by mouth Daily., Disp: , Rfl:   •  Omega-3 Fatty Acids (OMEGA 3 PO), Take 1 tablet by mouth Daily., Disp: , Rfl:   •  omeprazole (PriLOSEC) 40 MG capsule, Take 40 mg by mouth every morning., Disp: , Rfl:   •  valsartan-hydrochlorothiazide (DIOVAN-HCT) 160-12.5 MG per tablet, Take 1 tablet by mouth every evening., Disp: , Rfl:     Allergies   Allergen Reactions   • Robaxin [Methocarbamol] Rash       Family History   Problem Relation Age of Onset   • Diabetes Maternal Grandfather    • Diabetes Mother    • Hypertension Mother    • Diabetes Sister    • Hypertension Sister    • Malig Hyperthermia Neg Hx        Social History     Socioeconomic History   • Marital status:      Spouse name: Not on file   • Number of children: Not on file   • Years of education: Not on file   • Highest education level: Not on file   Tobacco Use   • Smoking status: Never Smoker  "  • Smokeless tobacco: Never Used   Substance and Sexual Activity   • Alcohol use: Yes     Alcohol/week: 1.0 standard drinks     Types: 1 Cans of beer per week     Comment: 1 drink a week   • Drug use: No   • Sexual activity: Yes     Partners: Female     Birth control/protection: None       Review of Systems   All other systems reviewed and are negative.      Objective     Pulse 80   Ht 170.2 cm (67\")   Wt 93 kg (205 lb)   SpO2 98%   BMI 32.11 kg/m²     Physical Exam   Constitutional: He is oriented to person, place, and time. He appears well-developed and well-nourished. No distress.   HENT:   Head: Normocephalic and atraumatic.   Eyes: Conjunctivae are normal. No scleral icterus.   Neck: Neck supple. No tracheal deviation present.   Cardiovascular: Normal rate, regular rhythm, normal heart sounds and intact distal pulses.   No murmur heard.  Pulmonary/Chest: Effort normal and breath sounds normal. No respiratory distress. He has no wheezes. He has no rales.   Abdominal: Soft. Bowel sounds are normal. He exhibits no distension. There is no tenderness. No hernia.   Musculoskeletal: He exhibits no edema or deformity.   Neurological: He is alert and oriented to person, place, and time.   Skin: Skin is warm and dry. He is not diaphoretic.   Psychiatric: He has a normal mood and affect. His behavior is normal.   Vitals reviewed.      DIAGNOSTIC DATA:    I reviewed the images and the report of his most recent CT scan of the abdomen and pelvis performed on 9/9/2019.  It shows inflammatory changes in the left upper quadrant.    I reviewed the images and the report of a colonoscopy performed at [facility] on 9/18/2017.  Two polyps were removed.    ASSESSMENT:    Status post laparoscopic distal pancreatectomy and splenectomy for a distal pancreas mass 3/15/2019- path showed chronic inflammation.  History of colonic polyps    PLAN:    Last colonoscopy was 2017.  Depending upon the pathology, he will " need to be scheduled this year or 2022.  We will obtain a copy of the pathology report.  I will contact him when I have seen the pathology of the polypectomies.      At this time he is largely asymptomatic from the resection of his pancreatic tail and spleen.  I have not recommended further imaging follow-up.

## 2020-04-09 ENCOUNTER — APPOINTMENT (OUTPATIENT)
Dept: CT IMAGING | Facility: HOSPITAL | Age: 75
End: 2020-04-09

## 2020-04-09 ENCOUNTER — HOSPITAL ENCOUNTER (OUTPATIENT)
Facility: HOSPITAL | Age: 75
Setting detail: OBSERVATION
Discharge: HOME | End: 2020-04-09
Attending: EMERGENCY MEDICINE | Admitting: NURSE PRACTITIONER

## 2020-04-09 VITALS
DIASTOLIC BLOOD PRESSURE: 84 MMHG | WEIGHT: 210.1 LBS | SYSTOLIC BLOOD PRESSURE: 152 MMHG | BODY MASS INDEX: 32.98 KG/M2 | RESPIRATION RATE: 16 BRPM | TEMPERATURE: 97.6 F | HEART RATE: 93 BPM | HEIGHT: 67 IN | OXYGEN SATURATION: 92 %

## 2020-04-09 DIAGNOSIS — R52 INTRACTABLE PAIN: ICD-10-CM

## 2020-04-09 DIAGNOSIS — N13.30 HYDRONEPHROSIS, RIGHT: Primary | ICD-10-CM

## 2020-04-09 DIAGNOSIS — N13.4 HYDROURETER ON RIGHT: ICD-10-CM

## 2020-04-09 DIAGNOSIS — R93.5 ABNORMAL CT OF THE ABDOMEN: ICD-10-CM

## 2020-04-09 LAB
ALBUMIN SERPL-MCNC: 4.3 G/DL (ref 3.5–5.2)
ALBUMIN/GLOB SERPL: 1.5 G/DL
ALP SERPL-CCNC: 84 U/L (ref 39–117)
ALT SERPL W P-5'-P-CCNC: 22 U/L (ref 1–41)
ANION GAP SERPL CALCULATED.3IONS-SCNC: 12.9 MMOL/L (ref 5–15)
ANION GAP SERPL CALCULATED.3IONS-SCNC: 14.2 MMOL/L (ref 5–15)
AST SERPL-CCNC: 18 U/L (ref 1–40)
BACTERIA UR QL AUTO: ABNORMAL /HPF
BASOPHILS # BLD AUTO: 0.09 10*3/MM3 (ref 0–0.2)
BASOPHILS NFR BLD AUTO: 0.9 % (ref 0–1.5)
BILIRUB SERPL-MCNC: 0.3 MG/DL (ref 0.2–1.2)
BILIRUB UR QL STRIP: NEGATIVE
BUN BLD-MCNC: 18 MG/DL (ref 8–23)
BUN BLD-MCNC: 18 MG/DL (ref 8–23)
BUN/CREAT SERPL: 15 (ref 7–25)
BUN/CREAT SERPL: 16.4 (ref 7–25)
CALCIUM SPEC-SCNC: 8.7 MG/DL (ref 8.6–10.5)
CALCIUM SPEC-SCNC: 9.1 MG/DL (ref 8.6–10.5)
CHLORIDE SERPL-SCNC: 101 MMOL/L (ref 98–107)
CHLORIDE SERPL-SCNC: 97 MMOL/L (ref 98–107)
CLARITY UR: CLEAR
CO2 SERPL-SCNC: 22.8 MMOL/L (ref 22–29)
CO2 SERPL-SCNC: 23.1 MMOL/L (ref 22–29)
COLOR UR: YELLOW
CREAT BLD-MCNC: 1.1 MG/DL (ref 0.76–1.27)
CREAT BLD-MCNC: 1.2 MG/DL (ref 0.76–1.27)
DEPRECATED RDW RBC AUTO: 48.8 FL (ref 37–54)
DEPRECATED RDW RBC AUTO: 49.2 FL (ref 37–54)
EOSINOPHIL # BLD AUTO: 0.4 10*3/MM3 (ref 0–0.4)
EOSINOPHIL NFR BLD AUTO: 3.8 % (ref 0.3–6.2)
ERYTHROCYTE [DISTWIDTH] IN BLOOD BY AUTOMATED COUNT: 14.3 % (ref 12.3–15.4)
ERYTHROCYTE [DISTWIDTH] IN BLOOD BY AUTOMATED COUNT: 14.4 % (ref 12.3–15.4)
GFR SERPL CREATININE-BSD FRML MDRD: 60 ML/MIN/1.73
GFR SERPL CREATININE-BSD FRML MDRD: 66 ML/MIN/1.73
GLOBULIN UR ELPH-MCNC: 2.9 GM/DL
GLUCOSE BLD-MCNC: 129 MG/DL (ref 65–99)
GLUCOSE BLD-MCNC: 157 MG/DL (ref 65–99)
GLUCOSE BLDC GLUCOMTR-MCNC: 115 MG/DL (ref 70–130)
GLUCOSE BLDC GLUCOMTR-MCNC: 141 MG/DL (ref 70–130)
GLUCOSE UR STRIP-MCNC: NEGATIVE MG/DL
HBA1C MFR BLD: 7.93 % (ref 4.8–5.6)
HCT VFR BLD AUTO: 37.9 % (ref 37.5–51)
HCT VFR BLD AUTO: 40.2 % (ref 37.5–51)
HGB BLD-MCNC: 13.2 G/DL (ref 13–17.7)
HGB BLD-MCNC: 13.6 G/DL (ref 13–17.7)
HGB UR QL STRIP.AUTO: ABNORMAL
HYALINE CASTS UR QL AUTO: ABNORMAL /LPF
IMM GRANULOCYTES # BLD AUTO: 0.08 10*3/MM3 (ref 0–0.05)
IMM GRANULOCYTES NFR BLD AUTO: 0.8 % (ref 0–0.5)
KETONES UR QL STRIP: NEGATIVE
LEUKOCYTE ESTERASE UR QL STRIP.AUTO: NEGATIVE
LIPASE SERPL-CCNC: 33 U/L (ref 13–60)
LYMPHOCYTES # BLD AUTO: 3.39 10*3/MM3 (ref 0.7–3.1)
LYMPHOCYTES NFR BLD AUTO: 32.1 % (ref 19.6–45.3)
MCH RBC QN AUTO: 31.8 PG (ref 26.6–33)
MCH RBC QN AUTO: 32.4 PG (ref 26.6–33)
MCHC RBC AUTO-ENTMCNC: 33.8 G/DL (ref 31.5–35.7)
MCHC RBC AUTO-ENTMCNC: 34.8 G/DL (ref 31.5–35.7)
MCV RBC AUTO: 93.1 FL (ref 79–97)
MCV RBC AUTO: 93.9 FL (ref 79–97)
MONOCYTES # BLD AUTO: 1.45 10*3/MM3 (ref 0.1–0.9)
MONOCYTES NFR BLD AUTO: 13.7 % (ref 5–12)
NEUTROPHILS # BLD AUTO: 5.16 10*3/MM3 (ref 1.7–7)
NEUTROPHILS NFR BLD AUTO: 48.7 % (ref 42.7–76)
NITRITE UR QL STRIP: NEGATIVE
NRBC BLD AUTO-RTO: 0.1 /100 WBC (ref 0–0.2)
PH UR STRIP.AUTO: <=5 [PH] (ref 5–8)
PLATELET # BLD AUTO: 401 10*3/MM3 (ref 140–450)
PLATELET # BLD AUTO: 409 10*3/MM3 (ref 140–450)
PMV BLD AUTO: 10 FL (ref 6–12)
PMV BLD AUTO: 10 FL (ref 6–12)
POTASSIUM BLD-SCNC: 3.9 MMOL/L (ref 3.5–5.2)
POTASSIUM BLD-SCNC: 4.9 MMOL/L (ref 3.5–5.2)
PROCALCITONIN SERPL-MCNC: 0.11 NG/ML (ref 0.1–0.25)
PROT SERPL-MCNC: 7.2 G/DL (ref 6–8.5)
PROT UR QL STRIP: ABNORMAL
RBC # BLD AUTO: 4.07 10*6/MM3 (ref 4.14–5.8)
RBC # BLD AUTO: 4.28 10*6/MM3 (ref 4.14–5.8)
RBC # UR: ABNORMAL /HPF
REF LAB TEST METHOD: ABNORMAL
SODIUM BLD-SCNC: 134 MMOL/L (ref 136–145)
SODIUM BLD-SCNC: 137 MMOL/L (ref 136–145)
SP GR UR STRIP: 1.02 (ref 1–1.03)
SQUAMOUS #/AREA URNS HPF: ABNORMAL /HPF
UROBILINOGEN UR QL STRIP: ABNORMAL
WBC NRBC COR # BLD: 10.57 10*3/MM3 (ref 3.4–10.8)
WBC NRBC COR # BLD: 12.47 10*3/MM3 (ref 3.4–10.8)
WBC UR QL AUTO: ABNORMAL /HPF

## 2020-04-09 PROCEDURE — 81001 URINALYSIS AUTO W/SCOPE: CPT | Performed by: PHYSICIAN ASSISTANT

## 2020-04-09 PROCEDURE — 99284 EMERGENCY DEPT VISIT MOD MDM: CPT

## 2020-04-09 PROCEDURE — 96376 TX/PRO/DX INJ SAME DRUG ADON: CPT

## 2020-04-09 PROCEDURE — 25010000002 HYDROMORPHONE PER 4 MG: Performed by: EMERGENCY MEDICINE

## 2020-04-09 PROCEDURE — 36415 COLL VENOUS BLD VENIPUNCTURE: CPT | Performed by: NURSE PRACTITIONER

## 2020-04-09 PROCEDURE — G0378 HOSPITAL OBSERVATION PER HR: HCPCS

## 2020-04-09 PROCEDURE — 25010000002 ONDANSETRON PER 1 MG: Performed by: EMERGENCY MEDICINE

## 2020-04-09 PROCEDURE — 85025 COMPLETE CBC W/AUTO DIFF WBC: CPT | Performed by: PHYSICIAN ASSISTANT

## 2020-04-09 PROCEDURE — 83036 HEMOGLOBIN GLYCOSYLATED A1C: CPT | Performed by: NURSE PRACTITIONER

## 2020-04-09 PROCEDURE — 96375 TX/PRO/DX INJ NEW DRUG ADDON: CPT

## 2020-04-09 PROCEDURE — 74176 CT ABD & PELVIS W/O CONTRAST: CPT

## 2020-04-09 PROCEDURE — 80053 COMPREHEN METABOLIC PANEL: CPT | Performed by: PHYSICIAN ASSISTANT

## 2020-04-09 PROCEDURE — 83690 ASSAY OF LIPASE: CPT | Performed by: PHYSICIAN ASSISTANT

## 2020-04-09 PROCEDURE — 84145 PROCALCITONIN (PCT): CPT | Performed by: NURSE PRACTITIONER

## 2020-04-09 PROCEDURE — 82962 GLUCOSE BLOOD TEST: CPT

## 2020-04-09 PROCEDURE — 85027 COMPLETE CBC AUTOMATED: CPT | Performed by: NURSE PRACTITIONER

## 2020-04-09 PROCEDURE — 96374 THER/PROPH/DIAG INJ IV PUSH: CPT

## 2020-04-09 RX ORDER — NICOTINE POLACRILEX 4 MG
15 LOZENGE BUCCAL
Status: DISCONTINUED | OUTPATIENT
Start: 2020-04-09 | End: 2020-04-09 | Stop reason: HOSPADM

## 2020-04-09 RX ORDER — HYDROMORPHONE HYDROCHLORIDE 1 MG/ML
0.5 INJECTION, SOLUTION INTRAMUSCULAR; INTRAVENOUS; SUBCUTANEOUS ONCE
Status: COMPLETED | OUTPATIENT
Start: 2020-04-09 | End: 2020-04-09

## 2020-04-09 RX ORDER — ACETAMINOPHEN 325 MG/1
650 TABLET ORAL EVERY 4 HOURS PRN
Status: DISCONTINUED | OUTPATIENT
Start: 2020-04-09 | End: 2020-04-09 | Stop reason: HOSPADM

## 2020-04-09 RX ORDER — MONTELUKAST SODIUM 10 MG/1
10 TABLET ORAL NIGHTLY
Status: DISCONTINUED | OUTPATIENT
Start: 2020-04-09 | End: 2020-04-09 | Stop reason: HOSPADM

## 2020-04-09 RX ORDER — ONDANSETRON 2 MG/ML
4 INJECTION INTRAMUSCULAR; INTRAVENOUS ONCE
Status: COMPLETED | OUTPATIENT
Start: 2020-04-09 | End: 2020-04-09

## 2020-04-09 RX ORDER — ATORVASTATIN CALCIUM 20 MG/1
40 TABLET, FILM COATED ORAL EVERY EVENING
Status: DISCONTINUED | OUTPATIENT
Start: 2020-04-09 | End: 2020-04-09 | Stop reason: HOSPADM

## 2020-04-09 RX ORDER — BISACODYL 5 MG/1
5 TABLET, DELAYED RELEASE ORAL DAILY PRN
Status: DISCONTINUED | OUTPATIENT
Start: 2020-04-09 | End: 2020-04-09 | Stop reason: HOSPADM

## 2020-04-09 RX ORDER — ACETAMINOPHEN 160 MG/5ML
650 SOLUTION ORAL EVERY 4 HOURS PRN
Status: DISCONTINUED | OUTPATIENT
Start: 2020-04-09 | End: 2020-04-09 | Stop reason: HOSPADM

## 2020-04-09 RX ORDER — SODIUM CHLORIDE 0.9 % (FLUSH) 0.9 %
10 SYRINGE (ML) INJECTION AS NEEDED
Status: DISCONTINUED | OUTPATIENT
Start: 2020-04-09 | End: 2020-04-09 | Stop reason: HOSPADM

## 2020-04-09 RX ORDER — ACETAMINOPHEN 650 MG/1
650 SUPPOSITORY RECTAL EVERY 4 HOURS PRN
Status: DISCONTINUED | OUTPATIENT
Start: 2020-04-09 | End: 2020-04-09 | Stop reason: HOSPADM

## 2020-04-09 RX ORDER — CALCIUM CARBONATE 200(500)MG
2 TABLET,CHEWABLE ORAL 2 TIMES DAILY PRN
Status: DISCONTINUED | OUTPATIENT
Start: 2020-04-09 | End: 2020-04-09 | Stop reason: HOSPADM

## 2020-04-09 RX ORDER — ONDANSETRON 2 MG/ML
4 INJECTION INTRAMUSCULAR; INTRAVENOUS EVERY 6 HOURS PRN
Status: DISCONTINUED | OUTPATIENT
Start: 2020-04-09 | End: 2020-04-09 | Stop reason: HOSPADM

## 2020-04-09 RX ORDER — TAMSULOSIN HYDROCHLORIDE 0.4 MG/1
0.4 CAPSULE ORAL DAILY
Status: DISCONTINUED | OUTPATIENT
Start: 2020-04-09 | End: 2020-04-09 | Stop reason: HOSPADM

## 2020-04-09 RX ORDER — SODIUM CHLORIDE 0.9 % (FLUSH) 0.9 %
10 SYRINGE (ML) INJECTION EVERY 12 HOURS SCHEDULED
Status: DISCONTINUED | OUTPATIENT
Start: 2020-04-09 | End: 2020-04-09 | Stop reason: HOSPADM

## 2020-04-09 RX ORDER — ONDANSETRON 4 MG/1
4 TABLET, FILM COATED ORAL EVERY 6 HOURS PRN
Status: DISCONTINUED | OUTPATIENT
Start: 2020-04-09 | End: 2020-04-09 | Stop reason: HOSPADM

## 2020-04-09 RX ORDER — PANTOPRAZOLE SODIUM 40 MG/1
40 TABLET, DELAYED RELEASE ORAL EVERY MORNING
Status: DISCONTINUED | OUTPATIENT
Start: 2020-04-09 | End: 2020-04-09 | Stop reason: HOSPADM

## 2020-04-09 RX ORDER — SODIUM CHLORIDE 9 MG/ML
100 INJECTION, SOLUTION INTRAVENOUS CONTINUOUS
Status: DISCONTINUED | OUTPATIENT
Start: 2020-04-09 | End: 2020-04-09 | Stop reason: HOSPADM

## 2020-04-09 RX ORDER — DEXTROSE MONOHYDRATE 25 G/50ML
25 INJECTION, SOLUTION INTRAVENOUS
Status: DISCONTINUED | OUTPATIENT
Start: 2020-04-09 | End: 2020-04-09 | Stop reason: HOSPADM

## 2020-04-09 RX ORDER — TAMSULOSIN HYDROCHLORIDE 0.4 MG/1
0.4 CAPSULE ORAL DAILY
Qty: 30 CAPSULE | Refills: 0 | Status: SHIPPED | OUTPATIENT
Start: 2020-04-09 | End: 2022-05-18

## 2020-04-09 RX ADMIN — SODIUM CHLORIDE, PRESERVATIVE FREE 10 ML: 5 INJECTION INTRAVENOUS at 08:47

## 2020-04-09 RX ADMIN — ONDANSETRON 4 MG: 2 INJECTION INTRAMUSCULAR; INTRAVENOUS at 04:49

## 2020-04-09 RX ADMIN — HYDROMORPHONE HYDROCHLORIDE 0.5 MG: 1 INJECTION, SOLUTION INTRAMUSCULAR; INTRAVENOUS; SUBCUTANEOUS at 03:57

## 2020-04-09 RX ADMIN — SODIUM CHLORIDE, PRESERVATIVE FREE 10 ML: 5 INJECTION INTRAVENOUS at 05:38

## 2020-04-09 RX ADMIN — HYDROMORPHONE HYDROCHLORIDE 0.5 MG: 1 INJECTION, SOLUTION INTRAMUSCULAR; INTRAVENOUS; SUBCUTANEOUS at 05:37

## 2020-04-09 RX ADMIN — HYDROMORPHONE HYDROCHLORIDE 0.5 MG: 1 INJECTION, SOLUTION INTRAMUSCULAR; INTRAVENOUS; SUBCUTANEOUS at 04:50

## 2020-04-09 RX ADMIN — SODIUM CHLORIDE 150 MG: 9 INJECTION, SOLUTION INTRAVENOUS at 04:33

## 2020-04-09 RX ADMIN — METOPROLOL TARTRATE 25 MG: 25 TABLET ORAL at 13:34

## 2020-04-09 RX ADMIN — SODIUM CHLORIDE 1000 ML: 9 INJECTION, SOLUTION INTRAVENOUS at 03:57

## 2020-04-09 RX ADMIN — SODIUM CHLORIDE 100 ML/HR: 9 INJECTION, SOLUTION INTRAVENOUS at 08:46

## 2020-04-09 RX ADMIN — TAMSULOSIN HYDROCHLORIDE 0.4 MG: 0.4 CAPSULE ORAL at 13:34

## 2020-04-09 RX ADMIN — PANTOPRAZOLE SODIUM 40 MG: 40 TABLET, DELAYED RELEASE ORAL at 13:34

## 2020-04-09 RX ADMIN — ONDANSETRON 4 MG: 2 INJECTION INTRAMUSCULAR; INTRAVENOUS at 03:57

## 2020-04-09 NOTE — ED PROVIDER NOTES
EMERGENCY DEPARTMENT ENCOUNTER    Room Number:  07/07  Date of encounter:  4/9/2020  PCP: Feli Slade MD  Historian: Patient      HPI:  Chief Complaint: Right flank pain      Context: Juan J Johnson Jr. is a 74 y.o. male who presents to the ED c/o right flank pain that began about 4-5 hours ago.  The pain is said to be sharp, 7/10 and radiated from the right CVA area toward the right groin.  Initially there was significant hematuria although the urine now appears more clear at this time.The patient has a history of kidney stones and is followed by [facility].  He states Dr Acosta has seen him in the past.  He has had to have lithotripsy and stents in th past.  He denies fever chills, abdominal pain, chest pain, shortness of breath, or recent sick contacts.      PAST MEDICAL HISTORY  Active Ambulatory Problems     Diagnosis Date Noted   • Osteoarthritis of left hip 06/13/2016   • Osteoarthritis of right hip 07/20/2016   • Acute pancreatitis without infection or necrosis 01/09/2019   • KENNEY (obstructive sleep apnea) 01/09/2019   • Hypertension 01/09/2019   • Diabetes mellitus type 2, noninsulin dependent  01/09/2019   • Nephrolithiasis 01/09/2019   • Hyponatremia 01/09/2019   • Hypersomnia due to medical condition 01/26/2019   • Ureterolithiasis 02/10/2019   • Sleep apnea 02/10/2019   • XIOMY (acute kidney injury) (CMS/[facility]) 02/10/2019   • DM II (diabetes mellitus, type II), controlled (CMS/[facility]) 02/10/2019   • Pancreatic mass 02/10/2019   • HTN (hypertension) 02/10/2019   • Splenic vein thrombosis 02/10/2019   • Long term current use of anticoagulant therapy 02/10/2019   • Nausea 03/27/2019   • Osteoarthritis of right hip 08/15/2014   • History of resection of pancreas 02/04/2020   • History of colonic polyps 02/04/2020     Resolved Ambulatory Problems     Diagnosis Date Noted   • No Resolved Ambulatory Problems     Past Medical History:   Diagnosis Date   • Arthritis    • Bilateral kidney stones    • Colon  polyp 09/20/2017   • GERD (gastroesophageal reflux disease)    • High cholesterol    • History of pancreatitis 02/2019         PAST SURGICAL HISTORY  Past Surgical History:   Procedure Laterality Date   • CYSTOSCOPY W/ URETERAL STENT PLACEMENT Left 2/11/2019    Procedure: CYSTOSCOPY URETERAL CATHETER/STENT INSERTION;  Surgeon: Adonis Simpson MD;  Location: [facility] MAIN OR;  Service: Urology   • ENDOSCOPY AND COLONOSCOPY N/A 2017    [facility]    • EXTRACORPOREAL SHOCK WAVE LITHOTRIPSY (ESWL) N/A 2014, 2/2019   • FINGER/THUMB CLOSED REDUCTION AND PERCUTANEOUS PINNING Left 1982    left thumb   • PANCREATECTOMY N/A 3/15/2019    Procedure: PANCREATIC RESECTION LAPAROSCOPIC AND SPLEENECTOMY;  Surgeon: Gm Sanders MD;  Location: [facility] MAIN OR;  Service: General   • SINUS SURGERY N/A 1970s   • TOTAL HIP ARTHROPLASTY Left 6/13/2016    Procedure: LT TOTAL HIP ARTHROPLASTY ANTERIOR;  Surgeon: Sandor Phillips MD;  Location: [facility];  Service:    • TOTAL HIP ARTHROPLASTY Right 7/20/2016    Procedure: RT TOTAL HIP ARTHROPLASTY ANTERIOR WITH HANA TABLE;  Surgeon: Sandor Phillips MD;  Location: [facility] MAIN OR;  Service:          FAMILY HISTORY  Family History   Problem Relation Age of Onset   • Diabetes Maternal Grandfather    • Diabetes Mother    • Hypertension Mother    • Diabetes Sister    • Hypertension Sister    • Malig Hyperthermia Neg Hx          SOCIAL HISTORY  Social History     Socioeconomic History   • Marital status:      Spouse name: Not on file   • Number of children: Not on file   • Years of education: Not on file   • Highest education level: Not on file   Tobacco Use   • Smoking status: Never Smoker   • Smokeless tobacco: Never Used   Substance and Sexual Activity   • Alcohol use: Yes     Alcohol/week: 1.0 standard drinks     Types: 1 Cans of beer per week     Comment: 1 drink a week   • Drug use: No   • Sexual activity: Yes     Partners: Female     Birth  control/protection: None         ALLERGIES  Robaxin [methocarbamol]        REVIEW OF SYSTEMS  Review of Systems     All systems reviewed and negative except for those discussed in HPI.       PHYSICAL EXAM    I have reviewed the triage vital signs and nursing notes.    ED Triage Vitals [04/09/20 0326]   Temp Heart Rate Resp BP SpO2   97.5 °F (36.4 °C) 93 16 -- 97 %      Temp src Heart Rate Source Patient Position BP Location FiO2 (%)   Tympanic -- -- -- --       Physical Exam  GENERAL: moderate distress, unable to sit still  HENT: nares patent  EYES: no scleral icterus  CV: regular rhythm, regular rate, no rubs murmurs, gallops.  RESPIRATORY: normal effort, no abnormal lung sounds.  ABDOMEN: soft, non-tender  : Right sided CVA tenderness  MUSCULOSKELETAL: unremarkable exam  NEURO: alert, moves all extremities, follows commands.  SKIN: warm, dry        LAB RESULTS  Recent Results (from the past 24 hour(s))   Comprehensive Metabolic Panel    Collection Time: 04/09/20  3:41 AM   Result Value Ref Range    Glucose 129 (H) 65 - 99 mg/dL    BUN 18 8 - 23 mg/dL    Creatinine 1.20 0.76 - 1.27 mg/dL    Sodium 134 (L) 136 - 145 mmol/L    Potassium 3.9 3.5 - 5.2 mmol/L    Chloride 97 (L) 98 - 107 mmol/L    CO2 22.8 22.0 - 29.0 mmol/L    Calcium 9.1 8.6 - 10.5 mg/dL    Total Protein 7.2 6.0 - 8.5 g/dL    Albumin 4.30 3.50 - 5.20 g/dL    ALT (SGPT) 22 1 - 41 U/L    AST (SGOT) 18 1 - 40 U/L    Alkaline Phosphatase 84 39 - 117 U/L    Total Bilirubin 0.3 0.2 - 1.2 mg/dL    eGFR Non African Amer 60 (L) >60 mL/min/1.73    Globulin 2.9 gm/dL    A/G Ratio 1.5 g/dL    BUN/Creatinine Ratio 15.0 7.0 - 25.0    Anion Gap 14.2 5.0 - 15.0 mmol/L   Lipase    Collection Time: 04/09/20  3:41 AM   Result Value Ref Range    Lipase 33 13 - 60 U/L   CBC Auto Differential    Collection Time: 04/09/20  3:41 AM   Result Value Ref Range    WBC 10.57 3.40 - 10.80 10*3/mm3    RBC 4.28 4.14 - 5.80 10*6/mm3    Hemoglobin 13.6 13.0 - 17.7 g/dL    Hematocrit  40.2 37.5 - 51.0 %    MCV 93.9 79.0 - 97.0 fL    MCH 31.8 26.6 - 33.0 pg    MCHC 33.8 31.5 - 35.7 g/dL    RDW 14.4 12.3 - 15.4 %    RDW-SD 49.2 37.0 - 54.0 fl    MPV 10.0 6.0 - 12.0 fL    Platelets 401 140 - 450 10*3/mm3    Neutrophil % 48.7 42.7 - 76.0 %    Lymphocyte % 32.1 19.6 - 45.3 %    Monocyte % 13.7 (H) 5.0 - 12.0 %    Eosinophil % 3.8 0.3 - 6.2 %    Basophil % 0.9 0.0 - 1.5 %    Immature Grans % 0.8 (H) 0.0 - 0.5 %    Neutrophils, Absolute 5.16 1.70 - 7.00 10*3/mm3    Lymphocytes, Absolute 3.39 (H) 0.70 - 3.10 10*3/mm3    Monocytes, Absolute 1.45 (H) 0.10 - 0.90 10*3/mm3    Eosinophils, Absolute 0.40 0.00 - 0.40 10*3/mm3    Basophils, Absolute 0.09 0.00 - 0.20 10*3/mm3    Immature Grans, Absolute 0.08 (H) 0.00 - 0.05 10*3/mm3    nRBC 0.1 0.0 - 0.2 /100 WBC   Urinalysis With Microscopic If Indicated (No Culture) - Urine, Clean Catch    Collection Time: 04/09/20  3:57 AM   Result Value Ref Range    Color, UA Yellow Yellow, Straw    Appearance, UA Clear Clear    pH, UA <=5.0 5.0 - 8.0    Specific Gravity, UA 1.019 1.005 - 1.030    Glucose, UA Negative Negative    Ketones, UA Negative Negative    Bilirubin, UA Negative Negative    Blood, UA Large (3+) (A) Negative    Protein, UA Trace (A) Negative    Leuk Esterase, UA Negative Negative    Nitrite, UA Negative Negative    Urobilinogen, UA 1.0 E.U./dL 0.2 - 1.0 E.U./dL   Urinalysis, Microscopic Only - Urine, Clean Catch    Collection Time: 04/09/20  3:57 AM   Result Value Ref Range    RBC, UA Too Numerous to Count (A) None Seen, 0-2 /HPF    WBC, UA 0-2 None Seen, 0-2 /HPF    Bacteria, UA None Seen None Seen /HPF    Squamous Epithelial Cells, UA 0-2 None Seen, 0-2 /HPF    Hyaline Casts, UA 0-2 None Seen /LPF    Methodology Automated Microscopy        Ordered the above labs and independently reviewed the results.        RADIOLOGY  Ct Abdomen Pelvis Without Contrast    Addendum Date: 4/9/2020    Lipid rich adenoma is seen within the right adrenal gland. No  additional follow-up is necessary.  This report was finalized on 4/9/2020 5:05 AM by Dr. Maria Luisa Dennis M.D.      Result Date: 4/9/2020  CT OF THE ABDOMEN AND PELVIS WITHOUT CONTRAST  HISTORY: Flank pain. HISTORY of kidney stones.  COMPARISON: 09/09/2019  TECHNIQUE: Axial CT imaging was obtained from the dome the diaphragm to the symphysis pubis. No IV contrast was administered.  FINDINGS: There is some mild atelectasis and scarring at the lung bases. There are coronary artery calcifications. There is a small hiatal hernia. The duodenum appears unremarkable. The liver is suspected to be steatotic. The patient is status post distal pancreatectomy and splenectomy. Within the splenectomy bed, the patient appears to have a probable splenule, as well as a larger masslike lesion, measuring up to 6.0 x 5.0 cm. On prior study, it had more of the appearance of fat necrosis. On the current study, again, it appears more masslike. Exact etiology is uncertain. Characterization with PET recommended. The patient has cholelithiasis. There is a 6 mm stone identified within the right kidney. No stones are seen on the left. The patient has mild right-sided hydroureteronephrosis. Exact location of the obstruction is difficult to assess, due to severe streak artifact from patient's bilateral hip arthroplasties. It is suspected to be within the distal right ureter, where there is a hyperdensity, measuring up to 3 mm in size. There is calcification of the abdominal aorta. The appendix is normal. No acute osseous abnormalities are seen.       1. Mild right-sided hydroureteronephrosis. Etiology is suspected to be a stone within the distal right ureter, although examination is significantly degraded by streak artifact from the patient's bilateral hip arthroplasties. 2. Masslike lesion identified within the patient's distal pancreatectomy/splenectomy bed. This area measures up to 6.0 x 5.0 cm. Prior pathology evidently did not show any  evidence of pancreatic neoplasm. However, this cannot be excluded on the basis of this examination. Further evaluation with PET is recommended.  Radiation dose reduction techniques were utilized, including automated exposure control and exposure modulation based on body size.  This report was finalized on 4/9/2020 4:45 AM by Dr. Maria Luisa Dennis M.D.        I ordered the above noted radiological studies. Reviewed by me and discussed with radiologist.  See dictation for official radiology interpretation.      PROCEDURES    Procedures      MEDICATIONS GIVEN IN ER    Medications   sodium chloride 0.9 % flush 10 mL (10 mL Intravenous Given 4/9/20 5056)   sodium chloride 0.9 % flush 10 mL (has no administration in time range)   sodium chloride 0.9 % flush 10 mL (has no administration in time range)   sodium chloride 0.9 % infusion (has no administration in time range)   acetaminophen (TYLENOL) tablet 650 mg (has no administration in time range)     Or   acetaminophen (TYLENOL) 160 MG/5ML solution 650 mg (has no administration in time range)     Or   acetaminophen (TYLENOL) suppository 650 mg (has no administration in time range)   bisacodyl (DULCOLAX) EC tablet 5 mg (has no administration in time range)   ondansetron (ZOFRAN) tablet 4 mg (has no administration in time range)     Or   ondansetron (ZOFRAN) injection 4 mg (has no administration in time range)   calcium carbonate (TUMS) chewable tablet 500 mg (200 mg elemental) (has no administration in time range)   dextrose (GLUTOSE) oral gel 15 g (has no administration in time range)   dextrose (D50W) 25 g/ 50mL Intravenous Solution 25 g (has no administration in time range)   glucagon (human recombinant) (GLUCAGEN DIAGNOSTIC) injection 1 mg (has no administration in time range)   insulin lispro (humaLOG) injection 0-9 Units (has no administration in time range)   sodium chloride 0.9 % bolus 1,000 mL (0 mL Intravenous Stopped 4/9/20 2460)   HYDROmorphone (DILAUDID)  injection 0.5 mg (0.5 mg Intravenous Given 4/9/20 0357)   ondansetron (ZOFRAN) injection 4 mg (4 mg Intravenous Given 4/9/20 0357)   lidocaine (XYLOCAINE) 1 % 150 mg in sodium chloride 0.9 % 250 mL IVPB (150 mg Intravenous Given 4/9/20 0433)   HYDROmorphone (DILAUDID) injection 0.5 mg (0.5 mg Intravenous Given 4/9/20 0450)   ondansetron (ZOFRAN) injection 4 mg (4 mg Intravenous Given 4/9/20 0449)   HYDROmorphone (DILAUDID) injection 0.5 mg (0.5 mg Intravenous Given 4/9/20 0537)         PROGRESS, DATA ANALYSIS, CONSULTS, AND MEDICAL DECISION MAKING    All labs have been independently reviewed by me.  All radiology studies have been reviewed by me and discussed with radiologist dictating the report.   EKG's independently viewed and interpreted by me.  Discussion below represents my analysis of pertinent findings related to patient's condition, differential diagnosis, treatment plan and final disposition.    DDx:  Ureterolithiasis,other ureter obstruction, pyelonephritis, colitis, hernia, appendicitis, AAA, Volvulus.  Given the lab and CT findings suspect ureterolithiasis that is not seed due to artifact.    ED Course as of Apr 09 0600   Thu Apr 09, 2020 0528 Discussed patient case with Dr Torres (supervising physician).  Unable to get the patient comfortable and get his nausea under control.  The patient has had 1.5mg of Dilaudid and a lidocaine infusion w/o relief.  There are other abnormal CT scan findings.  Will call medicine for admission and have urology consulted.    [RC]   1295 Call placed to [facility]    [RC]      ED Course User Index  [RC] Jeronimo Nye III, PA           Patient was placed in face mask in first look. Patient was wearing facemask when I entered the room and throughout our encounter. I wore protective equipment throughout this patient encounter including a face mask and gloves. Hand hygiene was performed before donning protective equipment and after removal when leaving the room.    AS OF  06:00 VITALS:    BP - 145/79  HR - 80  TEMP - 97.5 °F (36.4 °C) (Tympanic)  O2 SATS - 98%        DIAGNOSIS  Final diagnoses:   Hydronephrosis, right   Hydroureter on right   Intractable pain   Abnormal CT of the abdomen (masslike lesion in patients pancreatectomy/splenectomy bed)         DISPOSITION  ADMISSION    Discussed treatment plan and reason for admission with pt/family and admitting physician.  Pt/family voiced understanding of the plan for admission for further testing/treatment as needed.                Jeronimo Nye III, PA  04/09/20 0600

## 2020-04-09 NOTE — ED PROVIDER NOTES
The RAFFI and I have discussed this patients history, physical exam, and treatment plan. I have reviewed the documentation and personally had a face to face interaction with the patient. I affirm the documentation and agree with the treatment and plan.  The following note describes my personal findings    This patient is a 74-year-old male with a history of kidney stones presenting with sudden onset pain from the right flank into the right groin that happened earlier this evening.  This patient has had to have multiple lithotripsies in the past and he states that this pain feels similar to his previous stones.  The patient denies fevers but complains of nausea without vomiting.    Pt is resting comfortably in mild distress secondary to pain and without gross neurological deficit.  Pts CV exam is WNL without murmur.  Lungs are clear bilaterally. Abdomen is soft and non-tender throughout. Pt is without CVA tenderness.    Plan: Currently awaiting CT scan of the abdomen and pelvis.  Will attempt again pain control.  Disposition pending      The patient was wearing a facemask upon entrance into the room and remained in such throughout their visit.  I was wearing a facemask as well as gloves at any point entering the room and throughout the visit     Mo Torres MD  04/09/20 0031

## 2020-04-09 NOTE — CONSULTS
FIRST UROLOGY CONSULT      Patient Identification:  NAME:  Juan J Johnson Jr.  Age:  74 y.o.   Sex:  male   :  1945   MRN:  [id number]       Chief complaint: R flank pain.      History of present illness:  H/o R dull flank pain x 12 hours. Resolved. Radiated to groin. No N/V, no F/C. Hematuria. Resolved.  Ct scan minimal R HN with 3mm distal stone.        Past medical history:  Past Medical History:   Diagnosis Date   • Arthritis    • Bilateral kidney stones    • Colon polyp 2017    POLYPS REMOVED DURING SCOPE   • Diabetes mellitus type 2, noninsulin dependent (CMS/HCC)    • GERD (gastroesophageal reflux disease)    • High cholesterol    • History of pancreatitis 2019   • Hypertension    • Pancreatic mass    • Sleep apnea     NO CPAP CURRENTLY - WORKING WITH DR BELL    • Splenic vein thrombosis     POSSIBLE? - RECENT CT SCAN RESOLVED, OFF BLOOD THINNERS        Past surgical history:  Past Surgical History:   Procedure Laterality Date   • CYSTOSCOPY W/ URETERAL STENT PLACEMENT Left 2019    Procedure: CYSTOSCOPY URETERAL CATHETER/STENT INSERTION;  Surgeon: Adonis Simpson MD;  Location: [facility];  Service: Urology   • ENDOSCOPY AND COLONOSCOPY N/A     [facility]    • EXTRACORPOREAL SHOCK WAVE LITHOTRIPSY (ESWL) N/A , 2019   • FINGER/THUMB CLOSED REDUCTION AND PERCUTANEOUS PINNING Left     left thumb   • PANCREATECTOMY N/A 3/15/2019    Procedure: PANCREATIC RESECTION LAPAROSCOPIC AND SPLEENECTOMY;  Surgeon: Gm Sanders MD;  Location: [facility] OR;  Service: General   • SINUS SURGERY N/A    • TOTAL HIP ARTHROPLASTY Left 2016    Procedure: LT TOTAL HIP ARTHROPLASTY ANTERIOR;  Surgeon: Sandor Phillips MD;  Location: [facility] OR;  Service:    • TOTAL HIP ARTHROPLASTY Right 2016    Procedure: RT TOTAL HIP ARTHROPLASTY ANTERIOR WITH HANA TABLE;  Surgeon: Sandor Phillips MD;  Location: [facility] OR;  Service:         Allergies:  Robaxin [methocarbamol]    Home medications:  Medications Prior to Admission   Medication Sig Dispense Refill Last Dose   • atorvastatin (LIPITOR) 40 MG tablet Take 40 mg by mouth every evening.   4/8/2020 at Unknown time   • glimepiride (AMARYL) 1 MG tablet Take 1 mg by mouth 2 (Two) Times a Day.   4/8/2020 at Unknown time   • losartan-hydrochlorothiazide (HYZAAR) 100-25 MG per tablet Take 1 tablet by mouth Daily.   4/8/2020 at Unknown time   • metFORMIN (GLUCOPHAGE) 1000 MG tablet Take 1,000 mg by mouth 2 (two) times a day with meals.   4/8/2020 at Unknown time   • metoprolol tartrate (LOPRESSOR) 25 MG tablet Take 25 mg by mouth 2 (Two) Times a Day.   4/8/2020 at Unknown time   • omeprazole (PriLOSEC) 40 MG capsule Take 40 mg by mouth every morning.   4/8/2020 at Unknown time   • valsartan-hydrochlorothiazide (DIOVAN-HCT) 160-12.5 MG per tablet Take 1 tablet by mouth every evening.   4/8/2020 at Unknown time   • montelukast (SINGULAIR) 10 MG tablet Take 10 mg by mouth Every Night.   Unknown at Unknown time   • Multiple Vitamins-Minerals (MULTIVITAMIN PO) Take 1 tablet by mouth Daily.   Unknown at Unknown time   • Omega-3 Fatty Acids (OMEGA 3 PO) Take 1 tablet by mouth Daily.   Unknown at Unknown time        Hospital medications:    atorvastatin 40 mg Oral Q PM   insulin lispro 0-9 Units Subcutaneous 4x Daily With Meals & Nightly   metoprolol tartrate 25 mg Oral BID   montelukast 10 mg Oral Nightly   pantoprazole 40 mg Oral QAM   sodium chloride 10 mL Intravenous Q12H       sodium chloride 100 mL/hr Last Rate: 100 mL/hr (04/09/20 0846)     •  acetaminophen **OR** acetaminophen **OR** acetaminophen  •  bisacodyl  •  calcium carbonate  •  dextrose  •  dextrose  •  glucagon (human recombinant)  •  ondansetron **OR** ondansetron  •  [COMPLETED] Insert peripheral IV **AND** sodium chloride  •  sodium chloride    Family history:  Family History   Problem Relation Age of Onset   • Diabetes Maternal  Grandfather    • Diabetes Mother    • Hypertension Mother    • Diabetes Sister    • Hypertension Sister    • Malig Hyperthermia Neg Hx        Social history:  Social History     Tobacco Use   • Smoking status: Never Smoker   • Smokeless tobacco: Never Used   Substance Use Topics   • Alcohol use: Yes     Alcohol/week: 1.0 standard drinks     Types: 1 Cans of beer per week     Comment: 1 drink a week   • Drug use: No       Review of systems:      Positive for:  Flank pain.    Negative for:  Fever.      Objective:  TMax 24 hours:   Temp (24hrs), Av.4 °F (36.3 °C), Min:97.3 °F (36.3 °C), Max:97.5 °F (36.4 °C)      Vitals Ranges:   Temp:  [97.3 °F (36.3 °C)-97.5 °F (36.4 °C)] 97.3 °F (36.3 °C)  Heart Rate:  [72-93] 93  Resp:  [16] 16  BP: (122-171)/(55-91) 133/86    Intake/Output Last 3 shifts:  I/O last 3 completed shifts:  In: 1000 [IV Piggyback:1000]  Out: -      Physical Exam:    General Appearance:    Alert, cooperative, NAD   HEENT:    No trauma, pupils reactive, hearing intact   Back:     No CVA tenderness   Lungs:     Respirations unlabored, no wheezing    Heart:    RRR.   Abdomen:     Soft, NDNT, no masses, no guarding   :       Extremities:   No edema, no deformity   Lymphatic:   No neck or groin LAD   Skin:   No bleeding, bruising or rashes   Neuro/Psych:   Orientation intact, mood/affect pleasant, no focal findings       Results review:   I reviewed the patient's new clinical results.    Data review:  Lab Results (last 24 hours)     Procedure Component Value Units Date/Time    POC Glucose Once [[id number]]  (Normal) Collected:  20 1201    Specimen:  Blood Updated:  20 1203     Glucose 115 mg/dL     Hemoglobin A1c [[id number]]  (Abnormal) Collected:  20 0833    Specimen:  Blood Updated:  20 1019     Hemoglobin A1C 7.93 %     Narrative:       Hemoglobin A1C Ranges:    Increased Risk for Diabetes  5.7% to 6.4%  Diabetes                     >= 6.5%  Diabetic Goal                < 7.0%     Procalcitonin [[id number]] Collected:  04/09/20 0833    Specimen:  Blood Updated:  04/09/20 0955    Basic Metabolic Panel [[id number]]  (Abnormal) Collected:  04/09/20 0833    Specimen:  Blood Updated:  04/09/20 0923     Glucose 157 mg/dL      BUN 18 mg/dL      Creatinine 1.10 mg/dL      Sodium 137 mmol/L      Potassium 4.9 mmol/L      Chloride 101 mmol/L      CO2 23.1 mmol/L      Calcium 8.7 mg/dL      eGFR Non African Amer 66 mL/min/1.73      BUN/Creatinine Ratio 16.4     Anion Gap 12.9 mmol/L     Narrative:       GFR Normal >60  Chronic Kidney Disease <60  Kidney Failure <15      CBC (No Diff) [[id number]]  (Abnormal) Collected:  04/09/20 0833    Specimen:  Blood Updated:  04/09/20 0858     WBC 12.47 10*3/mm3      RBC 4.07 10*6/mm3      Hemoglobin 13.2 g/dL      Hematocrit 37.9 %      MCV 93.1 fL      MCH 32.4 pg      MCHC 34.8 g/dL      RDW 14.3 %      RDW-SD 48.8 fl      MPV 10.0 fL      Platelets 409 10*3/mm3     POC Glucose Once [[id number]]  (Abnormal) Collected:  04/09/20 0837    Specimen:  Blood Updated:  04/09/20 0839     Glucose 141 mg/dL     Comprehensive Metabolic Panel [[id number]]  (Abnormal) Collected:  04/09/20 0341    Specimen:  Blood Updated:  04/09/20 0423     Glucose 129 mg/dL      BUN 18 mg/dL      Creatinine 1.20 mg/dL      Sodium 134 mmol/L      Potassium 3.9 mmol/L      Chloride 97 mmol/L      CO2 22.8 mmol/L      Calcium 9.1 mg/dL      Total Protein 7.2 g/dL      Albumin 4.30 g/dL      ALT (SGPT) 22 U/L      AST (SGOT) 18 U/L      Alkaline Phosphatase 84 U/L      Total Bilirubin 0.3 mg/dL      eGFR Non African Amer 60 mL/min/1.73      Globulin 2.9 gm/dL      A/G Ratio 1.5 g/dL      BUN/Creatinine Ratio 15.0     Anion Gap 14.2 mmol/L     Narrative:       GFR Normal >60  Chronic Kidney Disease <60  Kidney Failure <15      Lipase [[id number]]  (Normal) Collected:  04/09/20 0341    Specimen:  Blood Updated:  04/09/20 0423     Lipase 33 U/L     Urinalysis With Microscopic If Indicated (No Culture)  - Urine, Clean Catch [[id number]]  (Abnormal) Collected:  04/09/20 0357    Specimen:  Urine, Clean Catch Updated:  04/09/20 0414     Color, UA Yellow     Appearance, UA Clear     pH, UA <=5.0     Specific Gravity, UA 1.019     Glucose, UA Negative     Ketones, UA Negative     Bilirubin, UA Negative     Blood, UA Large (3+)     Protein, UA Trace     Leuk Esterase, UA Negative     Nitrite, UA Negative     Urobilinogen, UA 1.0 E.U./dL    Urinalysis, Microscopic Only - Urine, Clean Catch [[id number]]  (Abnormal) Collected:  04/09/20 0357    Specimen:  Urine, Clean Catch Updated:  04/09/20 0414     RBC, UA Too Numerous to Count /HPF      WBC, UA 0-2 /HPF      Bacteria, UA None Seen /HPF      Squamous Epithelial Cells, UA 0-2 /HPF      Hyaline Casts, UA 0-2 /LPF      Methodology Automated Microscopy    CBC & Differential [[id number]] Collected:  04/09/20 0341    Specimen:  Blood Updated:  04/09/20 0356    Narrative:       The following orders were created for panel order CBC & Differential.  Procedure                               Abnormality         Status                     ---------                               -----------         ------                     CBC Auto Differential[[id number]]        Abnormal            Final result                 Please view results for these tests on the individual orders.    CBC Auto Differential [[id number]]  (Abnormal) Collected:  04/09/20 0341    Specimen:  Blood Updated:  04/09/20 0356     WBC 10.57 10*3/mm3      RBC 4.28 10*6/mm3      Hemoglobin 13.6 g/dL      Hematocrit 40.2 %      MCV 93.9 fL      MCH 31.8 pg      MCHC 33.8 g/dL      RDW 14.4 %      RDW-SD 49.2 fl      MPV 10.0 fL      Platelets 401 10*3/mm3      Neutrophil % 48.7 %      Lymphocyte % 32.1 %      Monocyte % 13.7 %      Eosinophil % 3.8 %      Basophil % 0.9 %      Immature Grans % 0.8 %      Neutrophils, Absolute 5.16 10*3/mm3      Lymphocytes, Absolute 3.39 10*3/mm3      Monocytes, Absolute 1.45 10*3/mm3       Eosinophils, Absolute 0.40 10*3/mm3      Basophils, Absolute 0.09 10*3/mm3      Immature Grans, Absolute 0.08 10*3/mm3      nRBC 0.1 /100 WBC            Imaging:  Imaging Results (Last 24 Hours)     Procedure Component Value Units Date/Time    CT Abdomen Pelvis Without Contrast [[id number]] Collected:  04/09/20 0422     Updated:  04/09/20 0508    Addenda:        Lipid rich adenoma is seen within the right adrenal gland. No additional  follow-up is necessary.     This report was finalized on 4/9/2020 5:05 AM by Dr. Maria Luisa Dennis M.D.     Signed:  04/09/20 0505 by Maria Luisa Dennis MD    Narrative:       CT OF THE ABDOMEN AND PELVIS WITHOUT CONTRAST     HISTORY: Flank pain. HISTORY of kidney stones.     COMPARISON: 09/09/2019     TECHNIQUE: Axial CT imaging was obtained from the dome the diaphragm to  the symphysis pubis. No IV contrast was administered.     FINDINGS:  There is some mild atelectasis and scarring at the lung bases. There are  coronary artery calcifications. There is a small hiatal hernia. The  duodenum appears unremarkable. The liver is suspected to be steatotic.  The patient is status post distal pancreatectomy and splenectomy. Within  the splenectomy bed, the patient appears to have a probable splenule, as  well as a larger masslike lesion, measuring up to 6.0 x 5.0 cm. On prior  study, it had more of the appearance of fat necrosis. On the current  study, again, it appears more masslike. Exact etiology is uncertain.  Characterization with PET recommended. The patient has cholelithiasis.  There is a 6 mm stone identified within the right kidney. No stones are  seen on the left. The patient has mild right-sided  hydroureteronephrosis. Exact location of the obstruction is difficult to  assess, due to severe streak artifact from patient's bilateral hip  arthroplasties. It is suspected to be within the distal right ureter,  where there is a hyperdensity, measuring up to 3 mm in size. There  is  calcification of the abdominal aorta. The appendix is normal. No acute  osseous abnormalities are seen.       Impression:          1. Mild right-sided hydroureteronephrosis. Etiology is suspected to be a  stone within the distal right ureter, although examination is  significantly degraded by streak artifact from the patient's bilateral  hip arthroplasties.  2. Masslike lesion identified within the patient's distal  pancreatectomy/splenectomy bed. This area measures up to 6.0 x 5.0 cm.  Prior pathology evidently did not show any evidence of pancreatic  neoplasm. However, this cannot be excluded on the basis of this  examination. Further evaluation with PET is recommended.     Radiation dose reduction techniques were utilized, including automated  exposure control and exposure modulation based on body size.     This report was finalized on 4/9/2020 4:45 AM by Dr. Maria Luisa Dennis M.D.                Assessment:       Hydronephrosis, right    Diabetes mellitus type 2, noninsulin dependent     Hyponatremia    Ureterolithiasis    Sleep apnea    HTN (hypertension)    Splenic vein thrombosis    History of resection of pancreas    R distal ureteral stone.  Pain free.  No fever.      Plan:     Flomax 0.4mg QD.  Pt should be able to pass stone.  OK to d/c per .  Pt to f/u Dr. Simpson 1 week.  Films reviewed.      Adonis Simpson MD  04/09/20  12:15

## 2020-04-09 NOTE — H&P
Patient Name:  Juan J Johnson Jr.  YOB: 1945  MRN:  [id number]  Admit Date:  4/9/2020  Patient Care Team:  Feli Slade MD as PCP - General (Internal Medicine)      Subjective   History Present Illness     Chief Complaint   Patient presents with   • Flank Pain     right side radiates to right abdominal area   • Nausea   • Blood in Urine     History of Present Illness   Mr. Johnson is a 74 y.o. former smoker with a history of ureterolithiasis, KENNEY, HTN, prior splenic vein thrombosis, splenectomy/partial pancreatectomy and DM2 that presents to [facility] complaining of flank pain and bloody urine. The patient states he has been in his usual state of health until last night around 2300 when he had a sudden onset of right sided flank pain that was severe and pressure-like. The pain was similar to pain he has had with prior kidney stones and radiated into his right groin near his right testicle. He states he went to urinate and noticed his urine was full of blood. He tried to drink a bunch of water and urinated again, but it was still bloody and his urine output was lessened. Around 0100, his urine had lightened, but the pain was still pretty severe so he came to the ED for evaluation.   In the ED, CT imaging showed mild right-sided hydroureteronephrosis with etiology suspected from a distal right ureter stone although this was obscured by the patient's bilateral hip arthroplasties. A mass-like lesion was noted in the patient's distal pancreatectomy/splenectomy bed as well. Prior pathology did not show neoplasm, but PET was recommended. Lab work was unremarkable. UA showed TNTC RBC in his urine, no bacteria or WBC. The patient states he did have some nausea in the ED, but this has subsided. His pain is currently gone as he feels his stone is now in his bladder. He has not urinated this morning. He denies any chest pain, dyspnea, cough, fever or chills. He states he feels good  enough to go home and cut his grass now. He has been admitted for observation and Urology has been consulted.    Review of Systems   Constitutional: Negative for activity change, appetite change, chills, fatigue and fever.   HENT: Negative for congestion, ear pain, sinus pressure and sinus pain.    Eyes: Negative for discharge and redness.   Respiratory: Negative for cough, choking, chest tightness and shortness of breath.    Cardiovascular: Negative for chest pain and leg swelling.   Gastrointestinal: Negative for abdominal distention, abdominal pain, constipation, diarrhea, nausea and vomiting.   Endocrine: Negative for cold intolerance and heat intolerance.   Genitourinary: Positive for decreased urine volume, difficulty urinating, flank pain and hematuria.   Musculoskeletal: Negative for arthralgias, back pain and gait problem.   Skin: Negative for color change and pallor.   Neurological: Negative for dizziness, weakness and numbness.   Psychiatric/Behavioral: Negative for confusion. The patient is not nervous/anxious.       Personal History     Past Medical History:   Diagnosis Date   • Arthritis    • Bilateral kidney stones    • Colon polyp 09/20/2017    POLYPS REMOVED DURING SCOPE   • Diabetes mellitus type 2, noninsulin dependent (CMS/HCC)    • GERD (gastroesophageal reflux disease)    • High cholesterol    • History of pancreatitis 02/2019   • Hypertension    • Pancreatic mass    • Sleep apnea     NO CPAP CURRENTLY - WORKING WITH DR BELL    • Splenic vein thrombosis     POSSIBLE? - RECENT CT SCAN RESOLVED, OFF BLOOD THINNERS      Past Surgical History:   Procedure Laterality Date   • CYSTOSCOPY W/ URETERAL STENT PLACEMENT Left 2/11/2019    Procedure: CYSTOSCOPY URETERAL CATHETER/STENT INSERTION;  Surgeon: Adonis Simpson MD;  Location: [facility];  Service: Urology   • ENDOSCOPY AND COLONOSCOPY N/A 2017    [facility]    • EXTRACORPOREAL SHOCK WAVE LITHOTRIPSY (ESWL) N/A  2014, 2/2019   • FINGER/THUMB CLOSED REDUCTION AND PERCUTANEOUS PINNING Left 1982    left thumb   • PANCREATECTOMY N/A 3/15/2019    Procedure: PANCREATIC RESECTION LAPAROSCOPIC AND SPLEENECTOMY;  Surgeon: Gm Sanders MD;  Location: [facility] MAIN OR;  Service: General   • SINUS SURGERY N/A 1970s   • TOTAL HIP ARTHROPLASTY Left 6/13/2016    Procedure: LT TOTAL HIP ARTHROPLASTY ANTERIOR;  Surgeon: Sandor Phillips MD;  Location: [facility] MAIN OR;  Service:    • TOTAL HIP ARTHROPLASTY Right 7/20/2016    Procedure: RT TOTAL HIP ARTHROPLASTY ANTERIOR WITH HANA TABLE;  Surgeon: Sandor Phillips MD;  Location: [facility] MAIN OR;  Service:      Family History   Problem Relation Age of Onset   • Diabetes Maternal Grandfather    • Diabetes Mother    • Hypertension Mother    • Diabetes Sister    • Hypertension Sister    • Malig Hyperthermia Neg Hx      Social History     Tobacco Use   • Smoking status: Never Smoker   • Smokeless tobacco: Never Used   Substance Use Topics   • Alcohol use: Yes     Alcohol/week: 1.0 standard drinks     Types: 1 Cans of beer per week     Comment: 1 drink a week   • Drug use: No     Medications Prior to Admission   Medication Sig Dispense Refill Last Dose   • atorvastatin (LIPITOR) 40 MG tablet Take 40 mg by mouth every evening.   4/8/2020 at Unknown time   • glimepiride (AMARYL) 1 MG tablet Take 1 mg by mouth 2 (Two) Times a Day.   4/8/2020 at Unknown time   • losartan-hydrochlorothiazide (HYZAAR) 100-25 MG per tablet Take 1 tablet by mouth Daily.   4/8/2020 at Unknown time   • metFORMIN (GLUCOPHAGE) 1000 MG tablet Take 1,000 mg by mouth 2 (two) times a day with meals.   4/8/2020 at Unknown time   • metoprolol tartrate (LOPRESSOR) 25 MG tablet Take 25 mg by mouth 2 (Two) Times a Day.   4/8/2020 at Unknown time   • omeprazole (PriLOSEC) 40 MG capsule Take 40 mg by mouth every morning.   4/8/2020 at Unknown time   • valsartan-hydrochlorothiazide (DIOVAN-HCT) 160-12.5 MG per tablet Take 1 tablet by mouth  every evening.   4/8/2020 at Unknown time   • montelukast (SINGULAIR) 10 MG tablet Take 10 mg by mouth Every Night.   Unknown at Unknown time   • Multiple Vitamins-Minerals (MULTIVITAMIN PO) Take 1 tablet by mouth Daily.   Unknown at Unknown time   • Omega-3 Fatty Acids (OMEGA 3 PO) Take 1 tablet by mouth Daily.   Unknown at Unknown time     Allergies:    Allergies   Allergen Reactions   • Robaxin [Methocarbamol] Rash       Objective    Objective     Vital Signs  Temp:  [97.3 °F (36.3 °C)-97.5 °F (36.4 °C)] 97.3 °F (36.3 °C)  Heart Rate:  [72-93] 93  Resp:  [16] 16  BP: (122-171)/(55-91) 133/86  SpO2:  [92 %-99 %] 92 %  on   ;   Device (Oxygen Therapy): room air  Body mass index is 32.91 kg/m².    Physical Exam   Constitutional: He is oriented to person, place, and time. He appears well-developed and well-nourished. No distress.   HENT:   Head: Normocephalic and atraumatic.   Nose: Nose normal.   Mouth/Throat: Oropharynx is clear and moist.   Eyes: Conjunctivae are normal. Right eye exhibits no discharge. Left eye exhibits no discharge.   Neck: Normal range of motion. Neck supple.   Cardiovascular: Normal rate, regular rhythm, normal heart sounds and intact distal pulses.   Pulmonary/Chest: Effort normal and breath sounds normal. No respiratory distress.   Abdominal: Soft. Bowel sounds are normal. He exhibits no distension. There is no tenderness.   Musculoskeletal: Normal range of motion. He exhibits no edema or tenderness.   Neurological: He is alert and oriented to person, place, and time. He exhibits normal muscle tone. Coordination normal.   Skin: Skin is warm and dry. He is not diaphoretic. No erythema.   Psychiatric: He has a normal mood and affect. His behavior is normal.   Nursing note and vitals reviewed.     Results Review:  I reviewed the patient's new clinical results.  I reviewed the patient's new imaging results and agree with the interpretation.  I reviewed the patient's other test results and agree  with the interpretation  I personally viewed and interpreted the patient's EKG/Telemetry data    Lab Results (last 24 hours)     Procedure Component Value Units Date/Time    CBC & Differential [[id number]] Collected:  04/09/20 0341    Specimen:  Blood Updated:  04/09/20 0356    Narrative:       The following orders were created for panel order CBC & Differential.  Procedure                               Abnormality         Status                     ---------                               -----------         ------                     CBC Auto Differential[[id number]]        Abnormal            Final result                 Please view results for these tests on the individual orders.    Comprehensive Metabolic Panel [[id number]]  (Abnormal) Collected:  04/09/20 0341    Specimen:  Blood Updated:  04/09/20 0423     Glucose 129 mg/dL      BUN 18 mg/dL      Creatinine 1.20 mg/dL      Sodium 134 mmol/L      Potassium 3.9 mmol/L      Chloride 97 mmol/L      CO2 22.8 mmol/L      Calcium 9.1 mg/dL      Total Protein 7.2 g/dL      Albumin 4.30 g/dL      ALT (SGPT) 22 U/L      AST (SGOT) 18 U/L      Alkaline Phosphatase 84 U/L      Total Bilirubin 0.3 mg/dL      eGFR Non African Amer 60 mL/min/1.73      Globulin 2.9 gm/dL      A/G Ratio 1.5 g/dL      BUN/Creatinine Ratio 15.0     Anion Gap 14.2 mmol/L     Narrative:       GFR Normal >60  Chronic Kidney Disease <60  Kidney Failure <15      Lipase [[id number]]  (Normal) Collected:  04/09/20 0341    Specimen:  Blood Updated:  04/09/20 0423     Lipase 33 U/L     CBC Auto Differential [[id number]]  (Abnormal) Collected:  04/09/20 0341    Specimen:  Blood Updated:  04/09/20 0356     WBC 10.57 10*3/mm3      RBC 4.28 10*6/mm3      Hemoglobin 13.6 g/dL      Hematocrit 40.2 %      MCV 93.9 fL      MCH 31.8 pg      MCHC 33.8 g/dL      RDW 14.4 %      RDW-SD 49.2 fl      MPV 10.0 fL      Platelets 401 10*3/mm3      Neutrophil % 48.7 %      Lymphocyte % 32.1 %      Monocyte % 13.7 %       Eosinophil % 3.8 %      Basophil % 0.9 %      Immature Grans % 0.8 %      Neutrophils, Absolute 5.16 10*3/mm3      Lymphocytes, Absolute 3.39 10*3/mm3      Monocytes, Absolute 1.45 10*3/mm3      Eosinophils, Absolute 0.40 10*3/mm3      Basophils, Absolute 0.09 10*3/mm3      Immature Grans, Absolute 0.08 10*3/mm3      nRBC 0.1 /100 WBC     Urinalysis With Microscopic If Indicated (No Culture) - Urine, Clean Catch [[id number]]  (Abnormal) Collected:  04/09/20 0357    Specimen:  Urine, Clean Catch Updated:  04/09/20 0414     Color, UA Yellow     Appearance, UA Clear     pH, UA <=5.0     Specific Gravity, UA 1.019     Glucose, UA Negative     Ketones, UA Negative     Bilirubin, UA Negative     Blood, UA Large (3+)     Protein, UA Trace     Leuk Esterase, UA Negative     Nitrite, UA Negative     Urobilinogen, UA 1.0 E.U./dL    Urinalysis, Microscopic Only - Urine, Clean Catch [[id number]]  (Abnormal) Collected:  04/09/20 0357    Specimen:  Urine, Clean Catch Updated:  04/09/20 0414     RBC, UA Too Numerous to Count /HPF      WBC, UA 0-2 /HPF      Bacteria, UA None Seen /HPF      Squamous Epithelial Cells, UA 0-2 /HPF      Hyaline Casts, UA 0-2 /LPF      Methodology Automated Microscopy    Basic Metabolic Panel [[id number]]  (Abnormal) Collected:  04/09/20 0833    Specimen:  Blood Updated:  04/09/20 0923     Glucose 157 mg/dL      BUN 18 mg/dL      Creatinine 1.10 mg/dL      Sodium 137 mmol/L      Potassium 4.9 mmol/L      Chloride 101 mmol/L      CO2 23.1 mmol/L      Calcium 8.7 mg/dL      eGFR Non African Amer 66 mL/min/1.73      BUN/Creatinine Ratio 16.4     Anion Gap 12.9 mmol/L     Narrative:       GFR Normal >60  Chronic Kidney Disease <60  Kidney Failure <15      CBC (No Diff) [[id number]]  (Abnormal) Collected:  04/09/20 0833    Specimen:  Blood Updated:  04/09/20 0858     WBC 12.47 10*3/mm3      RBC 4.07 10*6/mm3      Hemoglobin 13.2 g/dL      Hematocrit 37.9 %      MCV 93.1 fL      MCH 32.4 pg      MCHC 34.8 g/dL       RDW 14.3 %      RDW-SD 48.8 fl      MPV 10.0 fL      Platelets 409 10*3/mm3     Hemoglobin A1c [[id number]] Collected:  04/09/20 0833    Specimen:  Blood Updated:  04/09/20 0851    POC Glucose Once [[id number]]  (Abnormal) Collected:  04/09/20 0837    Specimen:  Blood Updated:  04/09/20 0839     Glucose 141 mg/dL           Imaging Results (Last 24 Hours)     Procedure Component Value Units Date/Time    CT Abdomen Pelvis Without Contrast [[id number]] Collected:  04/09/20 0422     Updated:  04/09/20 0508    Addenda:        Lipid rich adenoma is seen within the right adrenal gland. No additional  follow-up is necessary.     This report was finalized on 4/9/2020 5:05 AM by Dr. Maria Luisa Dennis M.D.     Signed:  04/09/20 0505 by Maria Luisa Dennis MD    Narrative:       CT OF THE ABDOMEN AND PELVIS WITHOUT CONTRAST     HISTORY: Flank pain. HISTORY of kidney stones.     COMPARISON: 09/09/2019     TECHNIQUE: Axial CT imaging was obtained from the dome the diaphragm to  the symphysis pubis. No IV contrast was administered.     FINDINGS:  There is some mild atelectasis and scarring at the lung bases. There are  coronary artery calcifications. There is a small hiatal hernia. The  duodenum appears unremarkable. The liver is suspected to be steatotic.  The patient is status post distal pancreatectomy and splenectomy. Within  the splenectomy bed, the patient appears to have a probable splenule, as  well as a larger masslike lesion, measuring up to 6.0 x 5.0 cm. On prior  study, it had more of the appearance of fat necrosis. On the current  study, again, it appears more masslike. Exact etiology is uncertain.  Characterization with PET recommended. The patient has cholelithiasis.  There is a 6 mm stone identified within the right kidney. No stones are  seen on the left. The patient has mild right-sided  hydroureteronephrosis. Exact location of the obstruction is difficult to  assess, due to severe streak artifact from  patient's bilateral hip  arthroplasties. It is suspected to be within the distal right ureter,  where there is a hyperdensity, measuring up to 3 mm in size. There is  calcification of the abdominal aorta. The appendix is normal. No acute  osseous abnormalities are seen.       Impression:          1. Mild right-sided hydroureteronephrosis. Etiology is suspected to be a  stone within the distal right ureter, although examination is  significantly degraded by streak artifact from the patient's bilateral  hip arthroplasties.  2. Masslike lesion identified within the patient's distal  pancreatectomy/splenectomy bed. This area measures up to 6.0 x 5.0 cm.  Prior pathology evidently did not show any evidence of pancreatic  neoplasm. However, this cannot be excluded on the basis of this  examination. Further evaluation with PET is recommended.     Radiation dose reduction techniques were utilized, including automated  exposure control and exposure modulation based on body size.     This report was finalized on 4/9/2020 4:45 AM by Dr. Maria Luisa Dennis M.D.              Assessment/Plan     Active Hospital Problems    Diagnosis POA   • **Hydronephrosis, right [N13.30] Yes   • History of resection of pancreas [Z90.410] Not Applicable   • Ureterolithiasis [N20.1] Yes   • HTN (hypertension) [I10] Yes   • Sleep apnea [G47.30] Yes     DOES NOT WEAR CPAP     • Splenic vein thrombosis [I82.890] Yes   • Diabetes mellitus type 2, noninsulin dependent  [E11.9] Yes   • Hyponatremia [E87.1] Yes     Hydronephrosis, right/history of ureterolithiasis  -Urology consultation. Await their recommendations. Prior lithotripsy and stenting with Dr. Simpson in past.  -Renal function normal. IVF going. Can have a diet if urology is okay with this.  -UA with TNTC RBC. No bacteria or urine. No fevers. Some mild leukocytosis this morning, but could be reactive. Will await urology recommendations if they feel antibiotic therapy warranted. Will check  procalcitonin.   -Strain urine to see if patient able to pass stone.    History of resection of pancreas/splenectomy/splenic vein thrombosis  -S/P pancreatectomy/splenectomy 3/15/19 with Dr. Sanders.   -Given CT imaging and patient's request, will ask Dr. Sanders to see patient.    HTN  -BP stable.  -Will resume home medications. Looks like there are some discrepancies with what he is on as he has two formulations of ARB/HCTZ listed. Will ask nursing to finalize medications and address thereafter.    DM2  -Controlled with PO medications.  -Hold oral medications for now. SSI while admitted.  -Add hemoglobin A1c.  -Monitor AC and HS.    Hyponatremia  -Mild on admission. Likely due to HCTZ use.  -Resolved.      · I discussed the patients findings and my recommendations with patient, nursing staff and Dr. Slade.    VTE Prophylaxis - SCDs.  Code Status - Full code.    Likely home later today if okay with above specialists.      VALORIE Wallace  [facility]  04/09/20  09:43

## 2020-04-09 NOTE — DISCHARGE SUMMARY
[facility]    Date of Discharge:  4/9/2020    PCP: Feli Slade MD    Discharge Diagnosis:   Active Hospital Problems    Diagnosis  POA   • **Hydronephrosis, right [N13.30]  Yes   • History of resection of pancreas [Z90.410]  Not Applicable   • Ureterolithiasis [N20.1]  Yes   • HTN (hypertension) [I10]  Yes   • Sleep apnea [G47.30]  Yes   • Splenic vein thrombosis [I82.890]  Yes   • Diabetes mellitus type 2, noninsulin dependent  [E11.9]  Yes   • Hyponatremia [E87.1]  Yes      Resolved Hospital Problems   No resolved problems to display.     Procedures Performed  none     Consults     Date and Time Order Name Status Description    4/9/2020 0949 Inpatient General Surgery Consult      4/9/2020 0558 Inpatient Urology Consult      4/9/2020 0513 [facility] (on-call MD unless specified) Details Completed         Hospital Course  Please see history and physical for details. Patient is a 74 y.o. male that initially presented to the hospital for complaints of right flank pain and hematuria. He had a UA on admission that showed TNTC RBC and a CT of his abdomen that reveal mild right hydronephrosis as well a 3 mm stone. His pain improved and Urology was consulted. They added Flomax 0.4 mg daily as well as follow up in 1 week with Dr. Simpson. They feel he should pass this on his own and can be discharge from their perspective. He did not have any bacteria or WBC in his urine and was afebrile. He did not require any antibiotic therapy as his leukocytosis was felt reactive and procalcitonin was normal.     His CT of abdomen did show a possible mass-like area on imaging. He had a prior history of splenectomy as well as partial pancreatectomy so originally Dr. Sanders was consulted. Dr. Slade spoke with Dr. Sanders via the phone and reviewed imaging. He recommended follow up in a couple weeks with him for further MRI as he felt his was just some localized fluid. This can be  further addressed outpatient.   The patient has been pain-free and hemodynamically stable. He feels ready and safe for discharge and will self-quarantine to avoid exposure given the current novel coronavirus pandemic. He denies chest pain, nausea, vomiting, diarrhea, dyspnea, cough, fever or chills. He states he feels so good that he could cut his grass. It was noted that he was taking two ARB medications at home. I will recommend discontinuation of the losartan and take the valsartan as this is what he was discharged on here in January. He can follow up with his PCP for further guidance on this.     I discussed the patient's findings and my recommendations with patient, nursing staff and Dr. Slade.    Condition on Discharge: Improved.     Temp:  [97.3 °F (36.3 °C)-97.5 °F (36.4 °C)] 97.3 °F (36.3 °C)  Heart Rate:  [72-93] 93  Resp:  [16] 16  BP: (122-171)/(55-91) 133/86  Body mass index is 32.91 kg/m².    Physical Exam   Constitutional: He is oriented to person, place, and time. He appears well-developed. No distress.   HENT:   Head: Normocephalic and atraumatic.   Nose: Nose normal.   Mouth/Throat: Oropharynx is clear and moist.   Eyes: Conjunctivae are normal. Right eye exhibits no discharge. Left eye exhibits no discharge.   Neck: Normal range of motion. Neck supple.   Cardiovascular: Normal rate, regular rhythm, normal heart sounds and intact distal pulses.   Pulmonary/Chest: Effort normal and breath sounds normal. No respiratory distress.   Abdominal: Soft. Bowel sounds are normal. He exhibits no distension. There is no tenderness.   Musculoskeletal: Normal range of motion. He exhibits no edema or tenderness.   Neurological: He is alert and oriented to person, place, and time. He exhibits normal muscle tone. Coordination normal.   Skin: Skin is warm and dry. He is not diaphoretic. No erythema.   Psychiatric: He has a normal mood and affect. His behavior is normal.   Nursing note and vitals reviewed.         Discharge Medications      New Medications      Instructions Start Date   tamsulosin 0.4 MG capsule 24 hr capsule  Commonly known as:  FLOMAX   0.4 mg, Oral, Daily         Continue These Medications      Instructions Start Date   atorvastatin 40 MG tablet  Commonly known as:  LIPITOR   40 mg, Oral, Every Evening      glimepiride 1 MG tablet  Commonly known as:  AMARYL   1 mg, Oral, 2 Times Daily      metFORMIN 1000 MG tablet  Commonly known as:  GLUCOPHAGE   1,000 mg, Oral, 2 Times Daily With Meals      metoprolol tartrate 25 MG tablet  Commonly known as:  LOPRESSOR   25 mg, Oral, 2 Times Daily      montelukast 10 MG tablet  Commonly known as:  SINGULAIR   10 mg, Oral, Nightly      MULTIVITAMIN PO   1 tablet, Oral, Daily      OMEGA 3 PO   1 tablet, Oral, Daily      omeprazole 40 MG capsule  Commonly known as:  priLOSEC   40 mg, Oral, Every Morning      valsartan-hydrochlorothiazide 160-12.5 MG per tablet  Commonly known as:  DIOVAN-HCT   1 tablet, Oral, Every Evening         Stop These Medications    losartan-hydrochlorothiazide 100-25 MG per tablet  Commonly known as:  HYZAAR             Additional Instructions for the Follow-ups that You Need to Schedule     Discharge Follow-up with PCP   As directed       Currently Documented PCP:    Feli Slade MD    PCP Phone Number:    [phone]     Follow Up Details:  follow up for further management of BP medications/DM management. repeat BMP/CBC in 1 week if able or see via telehealth         Discharge Follow-up with Specified Provider: Dr. Simpson; 1 Week   As directed      To:  Dr. Simpson    Follow Up:  1 Week    Follow Up Details:  post kidney stone follow up, hydronephrosis           Follow-up Information     Feli Slade MD .    Specialty:  Internal Medicine  Why:  follow up for further management of BP medications/DM management. repeat BMP/CBC in 1 week if able or see via telehealth  Contact information:  [address]  [facility]  [address]  [phone]                 Test Results Pending at Discharge     Dariana Patten, VALORIE  04/09/20  12:42    Discharge time spent greater than 30 minutes.

## 2020-04-09 NOTE — ED TRIAGE NOTES
Pt complains of right flank pain that started around 2300 when he urinated and noticed blood in his urine. Pt knows he has a 9mm stone in his right kidney. Pt has some nausea. He did drink drink water initially which helped but pain intensified around 0200.    [facility]

## 2020-04-09 NOTE — ED NOTES
Nursing report ED to floor  Juan J Johnson Jr.  74 y.o.  male    HPI (triage note):   Chief Complaint   Patient presents with   • Flank Pain     right side radiates to right abdominal area   • Nausea   • Blood in Urine       Admitting doctor:   Vincenzo Slade MD    Admitting diagnosis:   The primary encounter diagnosis was Hydronephrosis, right. Diagnoses of Hydroureter on right, Intractable pain, and Abnormal CT of the abdomen (masslike lesion in patients pancreatectomy/splenectomy bed) were also pertinent to this visit.    Code status:   Current Code Status     Date Active Code Status Order ID Comments User Context       4/9/2020 0558 CPR [id number]  Arlen Dumont APRN ED       Questions for Current Code Status     Question Answer Comment    Code Status CPR     Medical Interventions (Level of Support Prior to Arrest) Full           Allergies:   Robaxin [methocarbamol]    Weight:       04/09/20  0346   Weight: 95.3 kg (210 lb 1.6 oz)       Most recent vitals:   Vitals:    04/09/20 0445 04/09/20 0500 04/09/20 0530 04/09/20 0600   BP:  145/79 122/55 153/79   BP Location:       Patient Position:       Pulse:  80  88   Resp:  16  16   Temp:       TempSrc:       SpO2: 96% 98% 93% 95%   Weight:       Height:           Active LDAs/IV Access:   Lines, Drains & Airways    Active LDAs     Name:   Placement date:   Placement time:   Site:   Days:    Peripheral IV 04/09/20 0340 Right Antecubital   04/09/20    0340    Antecubital   less than 1                Labs (abnormal labs have a star):   Labs Reviewed   COMPREHENSIVE METABOLIC PANEL - Abnormal; Notable for the following components:       Result Value    Glucose 129 (*)     Sodium 134 (*)     Chloride 97 (*)     eGFR Non  Amer 60 (*)     All other components within normal limits    Narrative:     GFR Normal >60  Chronic Kidney Disease <60  Kidney Failure <15     URINALYSIS W/ MICROSCOPIC IF INDICATED (NO CULTURE) - Abnormal; Notable for the following  components:    Blood, UA Large (3+) (*)     Protein, UA Trace (*)     All other components within normal limits   CBC WITH AUTO DIFFERENTIAL - Abnormal; Notable for the following components:    Monocyte % 13.7 (*)     Immature Grans % 0.8 (*)     Lymphocytes, Absolute 3.39 (*)     Monocytes, Absolute 1.45 (*)     Immature Grans, Absolute 0.08 (*)     All other components within normal limits   URINALYSIS, MICROSCOPIC ONLY - Abnormal; Notable for the following components:    RBC, UA Too Numerous to Count (*)     All other components within normal limits   LIPASE - Normal   BASIC METABOLIC PANEL   CBC (NO DIFF)   HEMOGLOBIN A1C   POCT GLUCOSE FINGERSTICK   POCT GLUCOSE FINGERSTICK   POCT GLUCOSE FINGERSTICK   POCT GLUCOSE FINGERSTICK   CBC AND DIFFERENTIAL    Narrative:     The following orders were created for panel order CBC & Differential.  Procedure                               Abnormality         Status                     ---------                               -----------         ------                     CBC Auto Differential[[id number]]        Abnormal            Final result                 Please view results for these tests on the individual orders.       EKG:   No orders to display       Meds given in ED:   Medications   sodium chloride 0.9 % flush 10 mL (10 mL Intravenous Given 4/9/20 0538)   sodium chloride 0.9 % flush 10 mL (has no administration in time range)   sodium chloride 0.9 % flush 10 mL (has no administration in time range)   sodium chloride 0.9 % infusion (has no administration in time range)   acetaminophen (TYLENOL) tablet 650 mg (has no administration in time range)     Or   acetaminophen (TYLENOL) 160 MG/5ML solution 650 mg (has no administration in time range)     Or   acetaminophen (TYLENOL) suppository 650 mg (has no administration in time range)   bisacodyl (DULCOLAX) EC tablet 5 mg (has no administration in time range)   ondansetron (ZOFRAN) tablet 4 mg (has no administration in  time range)     Or   ondansetron (ZOFRAN) injection 4 mg (has no administration in time range)   calcium carbonate (TUMS) chewable tablet 500 mg (200 mg elemental) (has no administration in time range)   dextrose (GLUTOSE) oral gel 15 g (has no administration in time range)   dextrose (D50W) 25 g/ 50mL Intravenous Solution 25 g (has no administration in time range)   glucagon (human recombinant) (GLUCAGEN DIAGNOSTIC) injection 1 mg (has no administration in time range)   insulin lispro (humaLOG) injection 0-9 Units (has no administration in time range)   sodium chloride 0.9 % bolus 1,000 mL (0 mL Intravenous Stopped 4/9/20 0505)   HYDROmorphone (DILAUDID) injection 0.5 mg (0.5 mg Intravenous Given 4/9/20 0357)   ondansetron (ZOFRAN) injection 4 mg (4 mg Intravenous Given 4/9/20 0357)   lidocaine (XYLOCAINE) 1 % 150 mg in sodium chloride 0.9 % 250 mL IVPB (150 mg Intravenous Given 4/9/20 0433)   HYDROmorphone (DILAUDID) injection 0.5 mg (0.5 mg Intravenous Given 4/9/20 0450)   ondansetron (ZOFRAN) injection 4 mg (4 mg Intravenous Given 4/9/20 0449)   HYDROmorphone (DILAUDID) injection 0.5 mg (0.5 mg Intravenous Given 4/9/20 0537)       Imaging results:  Ct Abdomen Pelvis Without Contrast    Addendum Date: 4/9/2020    Lipid rich adenoma is seen within the right adrenal gland. No additional follow-up is necessary.  This report was finalized on 4/9/2020 5:05 AM by Dr. Maria Luisa Dennis M.D.      Result Date: 4/9/2020   1. Mild right-sided hydroureteronephrosis. Etiology is suspected to be a stone within the distal right ureter, although examination is significantly degraded by streak artifact from the patient's bilateral hip arthroplasties. 2. Masslike lesion identified within the patient's distal pancreatectomy/splenectomy bed. This area measures up to 6.0 x 5.0 cm. Prior pathology evidently did not show any evidence of pancreatic neoplasm. However, this cannot be excluded on the basis of this examination. Further  evaluation with PET is recommended.  Radiation dose reduction techniques were utilized, including automated exposure control and exposure modulation based on body size.  This report was finalized on 4/9/2020 4:45 AM by Dr. Maria Luisa Dennis M.D.        Ambulatory status:   - Pt independently ambulatory in ED with steady and even gait.    Social issues:   Social History     Socioeconomic History   • Marital status:      Spouse name: Not on file   • Number of children: Not on file   • Years of education: Not on file   • Highest education level: Not on file   Tobacco Use   • Smoking status: Never Smoker   • Smokeless tobacco: Never Used   Substance and Sexual Activity   • Alcohol use: Yes     Alcohol/week: 1.0 standard drinks     Types: 1 Cans of beer per week     Comment: 1 drink a week   • Drug use: No   • Sexual activity: Yes     Partners: Female     Birth control/protection: None          Chinyere Pardo, RN  04/09/20 0639

## 2020-04-10 NOTE — PROGRESS NOTES
Case Management Discharge Note      Final Note: Per MD notes patient DC'd home              Transportation Services  Private: Car    Final Discharge Disposition Code: 01 - home or self-care

## 2020-04-22 ENCOUNTER — OFFICE VISIT (OUTPATIENT)
Dept: SURGERY | Facility: CLINIC | Age: 75
End: 2020-04-22

## 2020-04-22 DIAGNOSIS — R93.5 ABNORMAL COMPUTED TOMOGRAPHY ANGIOGRAPHY (CTA) OF ABDOMEN AND PELVIS: ICD-10-CM

## 2020-04-22 DIAGNOSIS — N20.0 NEPHROLITHIASIS: Primary | ICD-10-CM

## 2020-04-22 PROCEDURE — 99213 OFFICE O/P EST LOW 20 MIN: CPT | Performed by: SURGERY

## 2020-04-23 NOTE — PROGRESS NOTES
CHIEF COMPLAINT:    Follow-up from partial pancreatectomy    HISTORY OF PRESENT ILLNESS:    Juan J Johnson Jr. is a 74 y.o. 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  The pathology showed chronic inflammatory changes consistent with pancreatitis.  His bowel movements are brown and formed.  There is no nausea or vomiting.  He has been imaged previously with CT scan of the abdomen and pelvis to follow postoperative inflammatory changes in the left upper quadrant.  Since his last office visit, he is admitted to the hospital with nephrolithiasis.  CT imaging showed a left upper quadrant soft tissue mass occupying the resection bed.  It is essentially unchanged in size, but has a more soft tissue appearance than previously noted.  I have attributed the changes in the left upper quadrant to hematoma/inflammation/fat necrosis following the distal pancreatectomy splenectomy.  He has been tolerating regular diet.  Stools are brown.  There is no abdominal pain.  There is no nausea or vomiting.  He has plans to follow-up with urology for his nephrolithiasis.    Past Medical History:   Diagnosis Date   • Arthritis    • Bilateral kidney stones    • Colon polyp 09/20/2017    POLYPS REMOVED DURING SCOPE   • Diabetes mellitus type 2, noninsulin dependent (CMS/HCC)    • GERD (gastroesophageal reflux disease)    • High cholesterol    • History of pancreatitis 02/2019   • Hypertension    • Pancreatic mass    • Sleep apnea     NO CPAP CURRENTLY - WORKING WITH DR BELL    • Splenic vein thrombosis     POSSIBLE? - RECENT CT SCAN RESOLVED, OFF BLOOD THINNERS        Past Surgical History:   Procedure Laterality Date   • CYSTOSCOPY W/ URETERAL STENT PLACEMENT Left 2/11/2019    Procedure: CYSTOSCOPY URETERAL CATHETER/STENT INSERTION;  Surgeon: Adonis Simpson MD;  Location: [facility];  Service: Urology   • ENDOSCOPY AND COLONOSCOPY N/A 2017    [facility]    • EXTRACORPOREAL SHOCK WAVE LITHOTRIPSY (ESWL) N/A 2014, 2/2019   • FINGER/THUMB CLOSED REDUCTION AND PERCUTANEOUS PINNING Left 1982    left thumb   • PANCREATECTOMY N/A 3/15/2019    Procedure: PANCREATIC RESECTION LAPAROSCOPIC AND SPLEENECTOMY;  Surgeon: Gm Sanders MD;  Location: [facility] MAIN OR;  Service: General   • SINUS SURGERY N/A 1970s   • TOTAL HIP ARTHROPLASTY Left 6/13/2016    Procedure: LT TOTAL HIP ARTHROPLASTY ANTERIOR;  Surgeon: Sandor Phillips MD;  Location: [facility] MAIN OR;  Service:    • TOTAL HIP ARTHROPLASTY Right 7/20/2016    Procedure: RT TOTAL HIP ARTHROPLASTY ANTERIOR WITH HANA TABLE;  Surgeon: Sandor hPillips MD;  Location: [facility] MAIN OR;  Service:          Current Outpatient Medications:   •  atorvastatin (LIPITOR) 40 MG tablet, Take 40 mg by mouth every evening., Disp: , Rfl:   •  glimepiride (AMARYL) 1 MG tablet, Take 1 mg by mouth 2 (Two) Times a Day., Disp: , Rfl:   •  metFORMIN (GLUCOPHAGE) 1000 MG tablet, Take 1,000 mg by mouth 2 (two) times a day with meals., Disp: , Rfl:   •  metoprolol tartrate (LOPRESSOR) 25 MG tablet, Take 25 mg by mouth 2 (Two) Times a Day., Disp: , Rfl:   •  montelukast (SINGULAIR) 10 MG tablet, Take 10 mg by mouth Every Night., Disp: , Rfl:   •  Multiple Vitamins-Minerals (MULTIVITAMIN PO), Take 1 tablet by mouth Daily., Disp: , Rfl:   •  Omega-3 Fatty Acids (OMEGA 3 PO), Take 1 tablet by mouth Daily., Disp: , Rfl:   •  omeprazole (PriLOSEC) 40 MG capsule, Take 40 mg by mouth every morning., Disp: , Rfl:   •  tamsulosin (FLOMAX) 0.4 MG capsule 24 hr capsule, Take 1 capsule by mouth Daily., Disp: 30 capsule, Rfl: 0  •  valsartan-hydrochlorothiazide (DIOVAN-HCT) 160-12.5 MG per tablet, Take 1 tablet by mouth every evening., Disp: , Rfl:     Allergies   Allergen Reactions   • Robaxin [Methocarbamol] Rash       Family History   Problem Relation Age of Onset   • Diabetes Maternal Grandfather    • Diabetes Mother    • Hypertension Mother     • Diabetes Sister    • Hypertension Sister    • Malig Hyperthermia Neg Hx        Social History     Socioeconomic History   • Marital status:      Spouse name: Not on file   • Number of children: Not on file   • Years of education: Not on file   • Highest education level: Not on file   Tobacco Use   • Smoking status: Never Smoker   • Smokeless tobacco: Never Used   Substance and Sexual Activity   • Alcohol use: Yes     Alcohol/week: 1.0 standard drinks     Types: 1 Cans of beer per week     Comment: 1 drink a week   • Drug use: No   • Sexual activity: Yes     Partners: Female     Birth control/protection: None       Review of Systems   All other systems reviewed and are negative.      Objective     There were no vitals taken for this visit.    Physical Exam   Constitutional: He is oriented to person, place, and time. He appears well-developed and well-nourished. No distress.   HENT:   Head: Normocephalic and atraumatic.   Eyes: Conjunctivae are normal. No scleral icterus.   Neck: No tracheal deviation present.   Pulmonary/Chest: Effort normal. No respiratory distress. He has no wheezes. He has no rales.   Abdominal: Soft. He exhibits no distension. There is no tenderness.   Musculoskeletal: He exhibits no edema or deformity.   Neurological: He is alert and oriented to person, place, and time.   Skin: Skin is warm and dry. He is not diaphoretic.   Psychiatric: He has a normal mood and affect. His behavior is normal.   Vitals reviewed.      DIAGNOSTIC DATA:    I reviewed the images and the report of his most recent CT scan of the abdomen and pelvis performed on 4/9/2020.  I compared it to CT imaging performed on 9/9/2019.  There is a well-circumscribed area of soft tissue that I have previously attributed to inflammatory changes in the left upper quadrant following his surgery.      ASSESSMENT:    Abnormal CT imaging of the left upper quadrant of the abdomen  Status post laparoscopic distal pancreatectomy and  splenectomy for a distal pancreas mass 3/15/2019- path showed chronic inflammation.  Nephrolithiasis  Most recent colonoscopy took place on 9/18/2017 at [facility].  Two small tubular adenomas were removed.      PLAN:    Next colonoscopy should take place in 2022.  I will repeat the CT scan of the abdomen and pelvis in about 5 months to confirm stability of the left upper quadrant soft tissue mass, and to track resolution of the nephrolithiasis.

## 2020-09-21 ENCOUNTER — HOSPITAL ENCOUNTER (OUTPATIENT)
Dept: CT IMAGING | Facility: HOSPITAL | Age: 75
Discharge: HOME | End: 2020-09-21
Admitting: SURGERY

## 2020-09-21 DIAGNOSIS — N20.0 NEPHROLITHIASIS: ICD-10-CM

## 2020-09-21 DIAGNOSIS — R93.5 ABNORMAL COMPUTED TOMOGRAPHY ANGIOGRAPHY (CTA) OF ABDOMEN AND PELVIS: ICD-10-CM

## 2020-09-21 LAB — CREAT BLDA-MCNC: 1 MG/DL (ref 0.6–1.3)

## 2020-09-21 PROCEDURE — 74178 CT ABD&PLV WO CNTR FLWD CNTR: CPT

## 2020-09-21 PROCEDURE — 25010000002 IOPAMIDOL 61 % SOLUTION: Performed by: SURGERY

## 2020-09-21 PROCEDURE — 82565 ASSAY OF CREATININE: CPT

## 2020-09-21 RX ADMIN — IOPAMIDOL 95 ML: 612 INJECTION, SOLUTION INTRAVENOUS at 09:53

## 2020-10-21 ENCOUNTER — OFFICE VISIT (OUTPATIENT)
Dept: SURGERY | Facility: CLINIC | Age: 75
End: 2020-10-21

## 2020-10-21 VITALS — WEIGHT: 211.6 LBS | HEIGHT: 67 IN | BODY MASS INDEX: 33.21 KG/M2

## 2020-10-21 DIAGNOSIS — R93.5 ABNORMAL ABDOMINAL CT SCAN: Primary | ICD-10-CM

## 2020-10-21 PROCEDURE — 99213 OFFICE O/P EST LOW 20 MIN: CPT | Performed by: SURGERY

## 2021-07-21 ENCOUNTER — APPOINTMENT (OUTPATIENT)
Dept: CT IMAGING | Facility: HOSPITAL | Age: 76
End: 2021-07-21

## 2021-07-21 ENCOUNTER — HOSPITAL ENCOUNTER (EMERGENCY)
Facility: HOSPITAL | Age: 76
Discharge: HOME | End: 2021-07-22
Attending: EMERGENCY MEDICINE | Admitting: EMERGENCY MEDICINE

## 2021-07-21 DIAGNOSIS — N20.1 RIGHT URETERAL STONE: Primary | ICD-10-CM

## 2021-07-21 LAB
ALBUMIN SERPL-MCNC: 4.2 G/DL (ref 3.5–5.2)
ALBUMIN/GLOB SERPL: 1.5 G/DL
ALP SERPL-CCNC: 80 U/L (ref 39–117)
ALT SERPL W P-5'-P-CCNC: 24 U/L (ref 1–41)
ANION GAP SERPL CALCULATED.3IONS-SCNC: 13.3 MMOL/L (ref 5–15)
AST SERPL-CCNC: 18 U/L (ref 1–40)
BACTERIA UR QL AUTO: ABNORMAL /HPF
BASOPHILS # BLD AUTO: 0.05 10*3/MM3 (ref 0–0.2)
BASOPHILS NFR BLD AUTO: 0.4 % (ref 0–1.5)
BILIRUB SERPL-MCNC: 0.3 MG/DL (ref 0–1.2)
BILIRUB UR QL STRIP: NEGATIVE
BUN SERPL-MCNC: 26 MG/DL (ref 8–23)
BUN/CREAT SERPL: 18.3 (ref 7–25)
CALCIUM SPEC-SCNC: 9.7 MG/DL (ref 8.6–10.5)
CHLORIDE SERPL-SCNC: 97 MMOL/L (ref 98–107)
CLARITY UR: CLEAR
CO2 SERPL-SCNC: 22.7 MMOL/L (ref 22–29)
COLOR UR: YELLOW
CREAT SERPL-MCNC: 1.42 MG/DL (ref 0.76–1.27)
D-LACTATE SERPL-SCNC: 2 MMOL/L (ref 0.5–2)
DEPRECATED RDW RBC AUTO: 48.7 FL (ref 37–54)
EOSINOPHIL # BLD AUTO: 0.13 10*3/MM3 (ref 0–0.4)
EOSINOPHIL NFR BLD AUTO: 0.9 % (ref 0.3–6.2)
ERYTHROCYTE [DISTWIDTH] IN BLOOD BY AUTOMATED COUNT: 15.2 % (ref 12.3–15.4)
GFR SERPL CREATININE-BSD FRML MDRD: 49 ML/MIN/1.73
GLOBULIN UR ELPH-MCNC: 2.8 GM/DL
GLUCOSE SERPL-MCNC: 182 MG/DL (ref 65–99)
GLUCOSE UR STRIP-MCNC: ABNORMAL MG/DL
HCT VFR BLD AUTO: 46.4 % (ref 37.5–51)
HGB BLD-MCNC: 14.9 G/DL (ref 13–17.7)
HGB UR QL STRIP.AUTO: ABNORMAL
HOLD SPECIMEN: NORMAL
HOLD SPECIMEN: NORMAL
HYALINE CASTS UR QL AUTO: ABNORMAL /LPF
IMM GRANULOCYTES # BLD AUTO: 0.05 10*3/MM3 (ref 0–0.05)
IMM GRANULOCYTES NFR BLD AUTO: 0.4 % (ref 0–0.5)
KETONES UR QL STRIP: NEGATIVE
LEUKOCYTE ESTERASE UR QL STRIP.AUTO: NEGATIVE
LIPASE SERPL-CCNC: 27 U/L (ref 13–60)
LYMPHOCYTES # BLD AUTO: 1.84 10*3/MM3 (ref 0.7–3.1)
LYMPHOCYTES NFR BLD AUTO: 12.9 % (ref 19.6–45.3)
MCH RBC QN AUTO: 28.4 PG (ref 26.6–33)
MCHC RBC AUTO-ENTMCNC: 32.1 G/DL (ref 31.5–35.7)
MCV RBC AUTO: 88.4 FL (ref 79–97)
MONOCYTES # BLD AUTO: 1.37 10*3/MM3 (ref 0.1–0.9)
MONOCYTES NFR BLD AUTO: 9.6 % (ref 5–12)
NEUTROPHILS NFR BLD AUTO: 10.81 10*3/MM3 (ref 1.7–7)
NEUTROPHILS NFR BLD AUTO: 75.8 % (ref 42.7–76)
NITRITE UR QL STRIP: NEGATIVE
NRBC BLD AUTO-RTO: 0.1 /100 WBC (ref 0–0.2)
PH UR STRIP.AUTO: <=5 [PH] (ref 5–8)
PLATELET # BLD AUTO: 439 10*3/MM3 (ref 140–450)
PMV BLD AUTO: 10.1 FL (ref 6–12)
POTASSIUM SERPL-SCNC: 4.2 MMOL/L (ref 3.5–5.2)
PROT SERPL-MCNC: 7 G/DL (ref 6–8.5)
PROT UR QL STRIP: NEGATIVE
RBC # BLD AUTO: 5.25 10*6/MM3 (ref 4.14–5.8)
RBC # UR: ABNORMAL /HPF
REF LAB TEST METHOD: ABNORMAL
SODIUM SERPL-SCNC: 133 MMOL/L (ref 136–145)
SP GR UR STRIP: 1.02 (ref 1–1.03)
SQUAMOUS #/AREA URNS HPF: ABNORMAL /HPF
UROBILINOGEN UR QL STRIP: ABNORMAL
WBC # BLD AUTO: 14.25 10*3/MM3 (ref 3.4–10.8)
WBC UR QL AUTO: ABNORMAL /HPF
WHOLE BLOOD HOLD SPECIMEN: NORMAL

## 2021-07-21 PROCEDURE — 80053 COMPREHEN METABOLIC PANEL: CPT | Performed by: EMERGENCY MEDICINE

## 2021-07-21 PROCEDURE — 96375 TX/PRO/DX INJ NEW DRUG ADDON: CPT

## 2021-07-21 PROCEDURE — 74176 CT ABD & PELVIS W/O CONTRAST: CPT

## 2021-07-21 PROCEDURE — 83605 ASSAY OF LACTIC ACID: CPT | Performed by: EMERGENCY MEDICINE

## 2021-07-21 PROCEDURE — 25010000002 ONDANSETRON PER 1 MG: Performed by: EMERGENCY MEDICINE

## 2021-07-21 PROCEDURE — 25010000002 HYDROMORPHONE 1 MG/ML SOLUTION: Performed by: EMERGENCY MEDICINE

## 2021-07-21 PROCEDURE — 96374 THER/PROPH/DIAG INJ IV PUSH: CPT

## 2021-07-21 PROCEDURE — 81001 URINALYSIS AUTO W/SCOPE: CPT | Performed by: EMERGENCY MEDICINE

## 2021-07-21 PROCEDURE — 83690 ASSAY OF LIPASE: CPT | Performed by: EMERGENCY MEDICINE

## 2021-07-21 PROCEDURE — 99283 EMERGENCY DEPT VISIT LOW MDM: CPT

## 2021-07-21 PROCEDURE — 85025 COMPLETE CBC W/AUTO DIFF WBC: CPT | Performed by: EMERGENCY MEDICINE

## 2021-07-21 PROCEDURE — 96361 HYDRATE IV INFUSION ADD-ON: CPT

## 2021-07-21 RX ORDER — ONDANSETRON 2 MG/ML
4 INJECTION INTRAMUSCULAR; INTRAVENOUS ONCE
Status: COMPLETED | OUTPATIENT
Start: 2021-07-21 | End: 2021-07-21

## 2021-07-21 RX ORDER — SODIUM CHLORIDE 0.9 % (FLUSH) 0.9 %
10 SYRINGE (ML) INJECTION AS NEEDED
Status: DISCONTINUED | OUTPATIENT
Start: 2021-07-21 | End: 2021-07-22 | Stop reason: HOSPADM

## 2021-07-21 RX ADMIN — ONDANSETRON 4 MG: 2 INJECTION INTRAMUSCULAR; INTRAVENOUS at 22:04

## 2021-07-21 RX ADMIN — SODIUM CHLORIDE, POTASSIUM CHLORIDE, SODIUM LACTATE AND CALCIUM CHLORIDE 1000 ML: 600; 310; 30; 20 INJECTION, SOLUTION INTRAVENOUS at 22:04

## 2021-07-21 RX ADMIN — HYDROMORPHONE HYDROCHLORIDE 1 MG: 1 INJECTION, SOLUTION INTRAMUSCULAR; INTRAVENOUS; SUBCUTANEOUS at 22:04

## 2021-07-22 VITALS
SYSTOLIC BLOOD PRESSURE: 149 MMHG | DIASTOLIC BLOOD PRESSURE: 85 MMHG | OXYGEN SATURATION: 93 % | RESPIRATION RATE: 20 BRPM | WEIGHT: 205 LBS | HEIGHT: 67 IN | BODY MASS INDEX: 32.18 KG/M2 | HEART RATE: 92 BPM | TEMPERATURE: 98.6 F

## 2021-07-22 NOTE — ED PROVIDER NOTES
EMERGENCY DEPARTMENT ENCOUNTER    Room Number:  12/12  Date of encounter:  7/21/2021  PCP: Feli Slade MD  Historian: Patient, wife      HPI:  Chief Complaint: Right flank pain  A complete HPI/ROS/PMH/PSH/SH/FH are unobtainable due to: None    Context: Juan J Johnson Jr. is a 75 y.o. male who presents to the ED c/o right flank pain. Onset 4:45 PM today. Symptoms are constant and severe. It is an aching character. It improved slightly with Norco. He reports having associated nausea. States this feels very similar to prior kidney stones.      PAST MEDICAL HISTORY  Active Ambulatory Problems     Diagnosis Date Noted   • Osteoarthritis of left hip 06/13/2016   • Osteoarthritis of right hip 07/20/2016   • Acute pancreatitis without infection or necrosis 01/09/2019   • KENNEY (obstructive sleep apnea) 01/09/2019   • Hypertension 01/09/2019   • Diabetes mellitus type 2, noninsulin dependent  01/09/2019   • Nephrolithiasis 01/09/2019   • Hyponatremia 01/09/2019   • Hypersomnia due to medical condition 01/26/2019   • Ureterolithiasis 02/10/2019   • Sleep apnea 02/10/2019   • XIOMY (acute kidney injury) (CMS/[facility]) 02/10/2019   • DM II (diabetes mellitus, type II), controlled (CMS/[facility]) 02/10/2019   • Pancreatic mass 02/10/2019   • HTN (hypertension) 02/10/2019   • Splenic vein thrombosis 02/10/2019   • Long term current use of anticoagulant therapy 02/10/2019   • Nausea 03/27/2019   • Osteoarthritis of right hip 08/15/2014   • History of resection of pancreas 02/04/2020   • History of colonic polyps 02/04/2020   • Hydronephrosis, right 04/09/2020     Resolved Ambulatory Problems     Diagnosis Date Noted   • No Resolved Ambulatory Problems     Past Medical History:   Diagnosis Date   • Arthritis    • Bilateral kidney stones    • Colon polyp 09/20/2017   • GERD (gastroesophageal reflux disease)    • High cholesterol    • History of pancreatitis 02/2019         PAST SURGICAL HISTORY  Past Surgical History:   Procedure  Laterality Date   • CYSTOSCOPY W/ URETERAL STENT PLACEMENT Left 2/11/2019    Procedure: CYSTOSCOPY URETERAL CATHETER/STENT INSERTION;  Surgeon: Adonis Simpson MD;  Location: [facility] MAIN OR;  Service: Urology   • ENDOSCOPY AND COLONOSCOPY N/A 2017    [facility]    • EXTRACORPOREAL SHOCK WAVE LITHOTRIPSY (ESWL) N/A 2014, 2/2019   • FINGER/THUMB CLOSED REDUCTION AND PERCUTANEOUS PINNING Left 1982    left thumb   • PANCREATECTOMY N/A 3/15/2019    Procedure: PANCREATIC RESECTION LAPAROSCOPIC AND SPLEENECTOMY;  Surgeon: Gm Sanders MD;  Location: [facility] MAIN OR;  Service: General   • SINUS SURGERY N/A 1970s   • TOTAL HIP ARTHROPLASTY Left 6/13/2016    Procedure: LT TOTAL HIP ARTHROPLASTY ANTERIOR;  Surgeon: Sandor Phillips MD;  Location: [facility] MAIN OR;  Service:    • TOTAL HIP ARTHROPLASTY Right 7/20/2016    Procedure: RT TOTAL HIP ARTHROPLASTY ANTERIOR WITH HANA TABLE;  Surgeon: Sandor Phillips MD;  Location: [facility] MAIN OR;  Service:          FAMILY HISTORY  Family History   Problem Relation Age of Onset   • Diabetes Maternal Grandfather    • Diabetes Mother    • Hypertension Mother    • Diabetes Sister    • Hypertension Sister    • Malig Hyperthermia Neg Hx          SOCIAL HISTORY  Social History     Socioeconomic History   • Marital status:      Spouse name: Not on file   • Number of children: Not on file   • Years of education: Not on file   • Highest education level: Not on file   Tobacco Use   • Smoking status: Never Smoker   • Smokeless tobacco: Never Used   Substance and Sexual Activity   • Alcohol use: Yes     Alcohol/week: 1.0 standard drinks     Types: 1 Cans of beer per week     Comment: 1 drink a week   • Drug use: No   • Sexual activity: Yes     Partners: Female     Birth control/protection: None         ALLERGIES  Robaxin [methocarbamol]        REVIEW OF SYSTEMS  Review of Systems     All systems reviewed and negative except for those discussed in HPI.        PHYSICAL EXAM    I have reviewed the triage vital signs and nursing notes.    ED Triage Vitals   Temp Heart Rate Resp BP SpO2   07/21/21 1830 07/21/21 1759 07/21/21 1759 07/21/21 1831 07/21/21 1759   98.6 °F (37 °C) 106 22 170/99 98 %      Temp src Heart Rate Source Patient Position BP Location FiO2 (%)   07/21/21 1830 -- -- -- --   Tympanic           Physical Exam  GENERAL: not distressed  HENT: nares patent  EYES: no scleral icterus  CV: regular rhythm, regular rate  RESPIRATORY: normal effort, clear to auscultation bilaterally  ABDOMEN: soft, nontender, no CVA tenderness  MUSCULOSKELETAL: no deformity  NEURO: alert, moves all extremities, follows commands  SKIN: warm, dry, no rash to the abdomen or back        LAB RESULTS  No results found for this or any previous visit (from the past 24 hour(s)).    Ordered the above labs and independently reviewed the results.        RADIOLOGY  No Radiology Exams Resulted Within Past 24 Hours    I ordered the above noted radiological studies. Reviewed by me and discussed with radiologist.  See dictation for official radiology interpretation.      PROCEDURES    Procedures      MEDICATIONS GIVEN IN ER    Medications   sodium chloride 0.9 % flush 10 mL (has no administration in time range)   HYDROmorphone (DILAUDID) injection 1 mg (has no administration in time range)   ondansetron (ZOFRAN) injection 4 mg (has no administration in time range)   lactated ringers bolus 1,000 mL (has no administration in time range)         PROGRESS, DATA ANALYSIS, CONSULTS, AND MEDICAL DECISION MAKING    All labs have been independently reviewed by me.  All radiology studies have been reviewed by me and discussed with radiologist dictating the report.   EKG's independently viewed and interpreted by me.  Discussion below represents my analysis of pertinent findings related to patient's condition, differential diagnosis, treatment plan and final disposition.    Differential diagnosis includes  but not limited to:  - hepatobiliary pathology such as cholecystitis, cholangitis, and symptomatic cholelithiasis  - Pancreatitis  - Dyspepsia  - Small bowel obstruction  - Appendicitis  - Diverticulitis  - UTI including pyelonephritis  - Ureteral stone  - Zoster  - Colitis, including infectious and ischemic      ED Course as of Jul 25 1907 Wed Jul 21, 2021   2212 RBC, UA(!): Too Numerous to Count [TD]   2212 Leukocytes, UA: Negative [TD]   2212 Nitrite, UA: Negative [TD]   2307 Creatinine(!): 1.42 [TD]      ED Course User Index  [TD] Efrain Anderson II, MD     CT scan of the abdomen pelvis interpreted by myself shows right ureteral stone.  Urine does not appear to be infected.  Pain is well controlled.    On medical chart review, patient was discharged from the hospital on 4/9/2020.  He was admitted to the hospital for 3 mm stone.      PPE: Both the patient and I wore a surgical mask throughout the entire patient encounter. I wore protective goggles.     AS OF 21:49 EDT VITALS:    BP - 170/99  HR - 106  TEMP - 98.6 °F (37 °C) (Tympanic)  O2 SATS - 98%        DIAGNOSIS  Final diagnoses:   Right ureteral stone         DISPOSITION  DISCHARGE    FOLLOW-UP  Feli Slade MD  [address] [id number] [phone]    Schedule an appointment as soon as possible for a visit   As needed    [facility]  [address] [facility] [address]  [phone]  Call in 1 day           Medication List      No changes were made to your prescriptions during this visit.                  Efrain Anderson II, MD  07/25/21 3617

## 2021-08-03 ENCOUNTER — HOSPITAL ENCOUNTER (OUTPATIENT)
Dept: GENERAL RADIOLOGY | Facility: HOSPITAL | Age: 76
Discharge: HOME | End: 2021-08-03
Admitting: UROLOGY

## 2021-08-03 DIAGNOSIS — N20.0 URIC ACID NEPHROLITHIASIS: ICD-10-CM

## 2021-08-03 PROCEDURE — 74018 RADEX ABDOMEN 1 VIEW: CPT

## 2022-05-18 ENCOUNTER — OFFICE VISIT (OUTPATIENT)
Dept: SURGERY | Facility: CLINIC | Age: 77
End: 2022-05-18

## 2022-05-18 VITALS — BODY MASS INDEX: 32.49 KG/M2 | WEIGHT: 207 LBS | HEIGHT: 67 IN

## 2022-05-18 DIAGNOSIS — Z86.010 HISTORY OF ADENOMATOUS POLYP OF COLON: Primary | ICD-10-CM

## 2022-05-18 PROCEDURE — 99214 OFFICE O/P EST MOD 30 MIN: CPT | Performed by: SURGERY

## 2022-05-18 RX ORDER — LOSARTAN POTASSIUM 100 MG/1
100 TABLET ORAL DAILY
COMMUNITY
Start: 2022-05-12

## 2022-05-18 RX ORDER — CHOLECALCIFEROL (VITAMIN D3) 25 MCG
CAPSULE ORAL
COMMUNITY
Start: 2020-10-26

## 2022-05-18 RX ORDER — ASPIRIN 81 MG/1
81 TABLET ORAL DAILY
COMMUNITY

## 2022-05-18 RX ORDER — DAPAGLIFLOZIN 10 MG/1
10 TABLET, FILM COATED ORAL DAILY
COMMUNITY
Start: 2022-04-23

## 2022-05-18 RX ORDER — HYDROCHLOROTHIAZIDE 25 MG/1
25 TABLET ORAL DAILY
COMMUNITY
Start: 2022-05-12

## 2022-05-22 NOTE — PROGRESS NOTES
ASSESSMENT/PLAN:    76-year-old gentleman who is a previous patient of Dr. Sanders.  [facility].  He is due this year.  He underwent a colonoscopy in 2017 and did have polyps removed at that time.  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    CC:     Chief Complaint   Patient presents with   • Colonoscopy   • EGD        HPI:    76-year-old gentleman with history of laparoscopic pancreatectomy and splenectomy with Dr. Sanders.  This demonstrated chronic inflammation consistent with changes of pancreatitis.  He presents today to establish care with me for ongoing surveillance colonoscopy.  He last underwent a colonoscopy in September 2017 at [facility].  He does have a history of polyps on colonoscopies.  He also has a family history of colon cancer with a paternal grandfather having had cancer.  He denies any stool changes such as melena or hematochezia.    ENDOSCOPY:   September 2017 EGD and colonoscopy with polyps removal.  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    LABS:    No results found for: BMP     SOCIAL HISTORY:   Social Determinants of Health     Tobacco Use: Low Risk    • Smoking Tobacco Use: Never Smoker   • Smokeless Tobacco Use: Never Used   Alcohol Use: Not on file   Financial Resource Strain: Not on file   Food Insecurity: Not on file   Transportation Needs: Not on file   Physical Activity: Not on file   Stress: Not on file   Social Connections: Not on file   Intimate Partner Violence: Not on file   Depression: Not on file   Housing Stability: Not on file        FAMILY HISTORY:    As stated in HPI    OTHER SURGERY:  Past Surgical History:   Procedure Laterality Date   • CYSTOSCOPY W/ URETERAL STENT PLACEMENT Left 2/11/2019    Procedure: CYSTOSCOPY URETERAL CATHETER/STENT INSERTION;   Surgeon: Adonis Simpson MD;  Location: [facility] MAIN OR;  Service: Urology   • ENDOSCOPY AND COLONOSCOPY N/A 2017    [facility]    • EXTRACORPOREAL SHOCK WAVE LITHOTRIPSY (ESWL) N/A 2014, 2/2019   • FINGER/THUMB CLOSED REDUCTION AND PERCUTANEOUS PINNING Left 1982    left thumb   • PANCREATECTOMY N/A 3/15/2019    Procedure: PANCREATIC RESECTION LAPAROSCOPIC AND SPLEENECTOMY;  Surgeon: Gm Sanders MD;  Location: [facility] MAIN OR;  Service: General   • SINUS SURGERY N/A 1970s   • TOTAL HIP ARTHROPLASTY Left 6/13/2016    Procedure: LT TOTAL HIP ARTHROPLASTY ANTERIOR;  Surgeon: Sandor Phillips MD;  Location: [facility] MAIN OR;  Service:    • TOTAL HIP ARTHROPLASTY Right 7/20/2016    Procedure: RT TOTAL HIP ARTHROPLASTY ANTERIOR WITH HANA TABLE;  Surgeon: Sandor Phillips MD;  Location: [facility] MAIN OR;  Service:         PAST MEDICAL HISTORY:    Past Medical History:   Diagnosis Date   • Arthritis    • Bilateral kidney stones    • Colon polyp 09/20/2017    POLYPS REMOVED DURING SCOPE   • Diabetes mellitus type 2, noninsulin dependent (HCC)    • GERD (gastroesophageal reflux disease)    • High cholesterol    • History of pancreatitis 02/2019   • Hypertension    • Pancreatic mass    • Sleep apnea     NO CPAP CURRENTLY - WORKING WITH DR BELL    • Splenic vein thrombosis     POSSIBLE? - RECENT CT SCAN RESOLVED, OFF BLOOD THINNERS         MEDICATIONS:   Current Outpatient Medications on File Prior to Visit   Medication Sig Dispense Refill   • aspirin 81 MG EC tablet Take 81 mg by mouth Daily.     • atorvastatin (LIPITOR) 40 MG tablet Take 40 mg by mouth every evening.     • Cholecalciferol (Vitamin D-3) 25 MCG (1000 UT) capsule      • Farxiga 10 MG tablet Take 10 mg by mouth Daily.     • glimepiride (AMARYL) 1 MG tablet Take 1 mg by mouth 2 (Two) Times a Day.     • hydroCHLOROthiazide (HYDRODIURIL) 25 MG tablet Take 25 mg by mouth Daily.     • losartan (COZAAR) 100 MG tablet Take 100 mg by mouth  Daily.     • metFORMIN (GLUCOPHAGE) 1000 MG tablet Take 1,000 mg by mouth 2 (two) times a day with meals.     • metoprolol tartrate (LOPRESSOR) 25 MG tablet Take 25 mg by mouth 2 (Two) Times a Day.     • montelukast (SINGULAIR) 10 MG tablet Take 10 mg by mouth Every Night.     • Multiple Vitamins-Minerals (MULTIVITAMIN PO) Take 1 tablet by mouth Daily.     • omeprazole (priLOSEC) 20 MG capsule Take 2 capsules by mouth Every Morning.     • valsartan-hydrochlorothiazide (DIOVAN-HCT) 160-12.5 MG per tablet Take 1 tablet by mouth every evening.       No current facility-administered medications on file prior to visit.        ALLERGIES:   Allergies   Allergen Reactions   • Robaxin [Methocarbamol] Rash        PHYSICAL EXAM:   • Constitutional: Well-developed well-nourished, no acute distress  • Neck: Supple, no palpable mass, trachea midline  • Respiratory: Symmetric Excursion, normal inspiratory effort  • Cardiovascular: Regular rate, no murmur, no carotid bruit  • Gastrointestinal: Soft, nontender, nondistended  • Psychiatric: Alert and oriented ×3, normal affect     Tobias Barrett M.D.  General and Endoscopic Surgery  [facility]    [address], [address]  [place], [place], [address]  P: [phone]  F: [phone]

## 2022-06-23 ENCOUNTER — ANESTHESIA (OUTPATIENT)
Dept: GASTROENTEROLOGY | Facility: HOSPITAL | Age: 77
End: 2022-06-23

## 2022-06-23 ENCOUNTER — HOSPITAL ENCOUNTER (OUTPATIENT)
Facility: HOSPITAL | Age: 77
Setting detail: HOSPITAL OUTPATIENT SURGERY
Discharge: HOME | End: 2022-06-23
Attending: SURGERY | Admitting: SURGERY

## 2022-06-23 ENCOUNTER — ANESTHESIA EVENT (OUTPATIENT)
Dept: GASTROENTEROLOGY | Facility: HOSPITAL | Age: 77
End: 2022-06-23

## 2022-06-23 VITALS
RESPIRATION RATE: 16 BRPM | DIASTOLIC BLOOD PRESSURE: 85 MMHG | SYSTOLIC BLOOD PRESSURE: 139 MMHG | HEIGHT: 67 IN | WEIGHT: 200 LBS | BODY MASS INDEX: 31.39 KG/M2 | HEART RATE: 81 BPM | OXYGEN SATURATION: 98 %

## 2022-06-23 DIAGNOSIS — Z86.010 HISTORY OF ADENOMATOUS POLYP OF COLON: ICD-10-CM

## 2022-06-23 LAB — GLUCOSE BLDC GLUCOMTR-MCNC: 159 MG/DL (ref 70–130)

## 2022-06-23 PROCEDURE — 82962 GLUCOSE BLOOD TEST: CPT

## 2022-06-23 PROCEDURE — 88305 TISSUE EXAM BY PATHOLOGIST: CPT | Performed by: SURGERY

## 2022-06-23 PROCEDURE — 45385 COLONOSCOPY W/LESION REMOVAL: CPT | Performed by: SURGERY

## 2022-06-23 PROCEDURE — 25010000002 PROPOFOL 10 MG/ML EMULSION: Performed by: ANESTHESIOLOGY

## 2022-06-23 RX ORDER — LIDOCAINE HYDROCHLORIDE 20 MG/ML
INJECTION, SOLUTION INFILTRATION; PERINEURAL AS NEEDED
Status: DISCONTINUED | OUTPATIENT
Start: 2022-06-23 | End: 2022-06-23 | Stop reason: SURG

## 2022-06-23 RX ORDER — SODIUM CHLORIDE, SODIUM LACTATE, POTASSIUM CHLORIDE, CALCIUM CHLORIDE 600; 310; 30; 20 MG/100ML; MG/100ML; MG/100ML; MG/100ML
1000 INJECTION, SOLUTION INTRAVENOUS CONTINUOUS
Status: DISCONTINUED | OUTPATIENT
Start: 2022-06-23 | End: 2022-06-23 | Stop reason: HOSPADM

## 2022-06-23 RX ORDER — LIDOCAINE HYDROCHLORIDE 10 MG/ML
0.5 INJECTION, SOLUTION INFILTRATION; PERINEURAL ONCE AS NEEDED
Status: DISCONTINUED | OUTPATIENT
Start: 2022-06-23 | End: 2022-06-23 | Stop reason: HOSPADM

## 2022-06-23 RX ORDER — SODIUM CHLORIDE 0.9 % (FLUSH) 0.9 %
10 SYRINGE (ML) INJECTION AS NEEDED
Status: DISCONTINUED | OUTPATIENT
Start: 2022-06-23 | End: 2022-06-23 | Stop reason: HOSPADM

## 2022-06-23 RX ORDER — PROPOFOL 10 MG/ML
VIAL (ML) INTRAVENOUS CONTINUOUS PRN
Status: DISCONTINUED | OUTPATIENT
Start: 2022-06-23 | End: 2022-06-23 | Stop reason: SURG

## 2022-06-23 RX ADMIN — PROPOFOL 100 MCG/KG/MIN: 10 INJECTION, EMULSION INTRAVENOUS at 15:50

## 2022-06-23 RX ADMIN — SODIUM CHLORIDE, POTASSIUM CHLORIDE, SODIUM LACTATE AND CALCIUM CHLORIDE 1000 ML: 600; 310; 30; 20 INJECTION, SOLUTION INTRAVENOUS at 13:59

## 2022-06-23 RX ADMIN — LIDOCAINE HYDROCHLORIDE 60 MG: 20 INJECTION, SOLUTION INFILTRATION; PERINEURAL at 15:48

## 2022-06-23 RX ADMIN — PROPOFOL 60 MG: 10 INJECTION, EMULSION INTRAVENOUS at 15:48

## 2022-06-23 NOTE — ANESTHESIA POSTPROCEDURE EVALUATION
Patient: Juan J Johnson Jr.    Procedure Summary     Date: 06/23/22 Room / Location: [facility] ENDOSCOPY 6 /  [facility]    Anesthesia Start: 1543 Anesthesia Stop: 1622    Procedure: COLONOSCOPY to cecum with cold polypectomy and hot snare polypectomies (N/A ) Diagnosis:       History of adenomatous polyp of colon      (History of adenomatous polyp of colon [Z86.010])    Surgeons: Tobias Barrett MD Provider: Kala Mcconnell MD    Anesthesia Type: MAC ASA Status: 3          Anesthesia Type: MAC    Vitals  No vitals data found for the desired time range.          Post Anesthesia Care and Evaluation    Patient location during evaluation: bedside  Patient participation: complete - patient participated  Level of consciousness: awake  Pain management: adequate    Airway patency: patent  Anesthetic complications: No anesthetic complications    Cardiovascular status: acceptable  Respiratory status: acceptable  Hydration status: acceptable

## 2022-06-23 NOTE — H&P
.  ASSESSMENT/PLAN:    76-year-old gentleman who is a previous patient of Dr. Sanders.  He presents to reestablish care with regards to surveillance colonoscopy.  He is due this year.  He underwent a colonoscopy in 2017 and did have polyps removed at that time.  Plan for repeat colonoscopy.  I discussed with him repeating EGD.  He had suggestion of Miner's esophagus on 3 EGDs ago but since then has had 2 that have not shown Miner's esophagus.  He has no symptoms of reflux or dysphagia.  Will defer EGD at this time.    CC:     No chief complaint on file.       HPI:    76-year-old gentleman with history of laparoscopic pancreatectomy and splenectomy with Dr. Sanders.  This demonstrated chronic inflammation consistent with changes of pancreatitis.  He presents today to establish care with me for ongoing surveillance colonoscopy.  He last underwent a colonoscopy in September 2017 at [facility].  He does have a history of polyps on colonoscopies.  He also has a family history of colon cancer with a paternal grandfather having had cancer.  He denies any stool changes such as melena or hematochezia.    ENDOSCOPY:   September 2017 EGD and colonoscopy with polyps removal.  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    LABS:    No results found for: BMP     SOCIAL HISTORY:   Social Determinants of Health     Tobacco Use: Low Risk    • Smoking Tobacco Use: Never Smoker   • Smokeless Tobacco Use: Never Used   Alcohol Use: Not on file   Financial Resource Strain: Not on file   Food Insecurity: Not on file   Transportation Needs: Not on file   Physical Activity: Not on file   Stress: Not on file   Social Connections: Not on file   Intimate Partner Violence: Not on file   Depression: Not on file   Housing Stability: Not on file        FAMILY HISTORY:    As stated in HPI    OTHER SURGERY:  Past Surgical History:   Procedure Laterality Date   •  CYSTOSCOPY W/ URETERAL STENT PLACEMENT Left 2/11/2019    Procedure: CYSTOSCOPY URETERAL CATHETER/STENT INSERTION;  Surgeon: Adonis Simpson MD;  Location: [facility] MAIN OR;  Service: Urology   • ENDOSCOPY AND COLONOSCOPY N/A 2017    [facility]    • EXTRACORPOREAL SHOCK WAVE LITHOTRIPSY (ESWL) N/A 2014, 2/2019   • FINGER/THUMB CLOSED REDUCTION AND PERCUTANEOUS PINNING Left 1982    left thumb   • PANCREATECTOMY N/A 3/15/2019    Procedure: PANCREATIC RESECTION LAPAROSCOPIC AND SPLEENECTOMY;  Surgeon: Gm Sanders MD;  Location: [facility] MAIN OR;  Service: General   • SINUS SURGERY N/A 1970s   • TOTAL HIP ARTHROPLASTY Left 6/13/2016    Procedure: LT TOTAL HIP ARTHROPLASTY ANTERIOR;  Surgeon: Sandor Phillips MD;  Location: [facility] MAIN OR;  Service:    • TOTAL HIP ARTHROPLASTY Right 7/20/2016    Procedure: RT TOTAL HIP ARTHROPLASTY ANTERIOR WITH HANA TABLE;  Surgeon: Sandor Phillips MD;  Location: [facility] MAIN OR;  Service:         PAST MEDICAL HISTORY:    Past Medical History:   Diagnosis Date   • Arthritis    • Bilateral kidney stones    • Colon polyp 09/20/2017    POLYPS REMOVED DURING SCOPE   • Diabetes mellitus type 2, noninsulin dependent (HCC)    • GERD (gastroesophageal reflux disease)    • High cholesterol    • History of pancreatitis 02/2019   • Hypertension    • Pancreatic mass    • Sleep apnea     NO CPAP CURRENTLY - WORKING WITH DR BELL    • Splenic vein thrombosis     POSSIBLE? - RECENT CT SCAN RESOLVED, OFF BLOOD THINNERS         MEDICATIONS:   No current facility-administered medications on file prior to encounter.     Current Outpatient Medications on File Prior to Encounter   Medication Sig Dispense Refill   • aspirin 81 MG EC tablet Take 81 mg by mouth Daily.     • atorvastatin (LIPITOR) 40 MG tablet Take 40 mg by mouth every evening.     • Cholecalciferol (Vitamin D-3) 25 MCG (1000 UT) capsule      • Farxiga 10 MG tablet Take 10 mg by mouth Daily.     • glimepiride  (AMARYL) 1 MG tablet Take 1 mg by mouth 2 (Two) Times a Day.     • hydroCHLOROthiazide (HYDRODIURIL) 25 MG tablet Take 25 mg by mouth Daily.     • losartan (COZAAR) 100 MG tablet Take 100 mg by mouth Daily.     • metFORMIN (GLUCOPHAGE) 1000 MG tablet Take 1,000 mg by mouth 2 (two) times a day with meals.     • metoprolol tartrate (LOPRESSOR) 25 MG tablet Take 25 mg by mouth 2 (Two) Times a Day.     • montelukast (SINGULAIR) 10 MG tablet Take 10 mg by mouth Every Night.     • Multiple Vitamins-Minerals (MULTIVITAMIN PO) Take 1 tablet by mouth Daily.     • omeprazole (priLOSEC) 20 MG capsule Take 2 capsules by mouth Every Morning.     • valsartan-hydrochlorothiazide (DIOVAN-HCT) 160-12.5 MG per tablet Take 1 tablet by mouth every evening.          ALLERGIES:   Allergies   Allergen Reactions   • Robaxin [Methocarbamol] Rash        PHYSICAL EXAM:   • Constitutional: Well-developed well-nourished, no acute distress  • Neck: Supple, no palpable mass, trachea midline  • Respiratory: Symmetric Excursion, normal inspiratory effort  • Cardiovascular: Regular rate, no murmur, no carotid bruit  • Gastrointestinal: Soft, nontender, nondistended  • Psychiatric: Alert and oriented ×3, normal affect     Tobias Barrett M.D.  General and Endoscopic Surgery  [facility]    [address], [address]  [place], [place], [address]  P: [phone]  F: [phone]

## 2022-06-23 NOTE — OP NOTE
Colonoscopy Procedure Note  Juan J Johnson JrAlbertina  1945  Date of Procedure: 06/23/22    Pre-operative Diagnosis:    History of colon polyps    Post-operative Diagnosis:  Transverse, descending, sigmoid colon polyp    Procedure: Colonoscopy to cecum with biopsy of polyp x3    Findings/Treatments:   Transverse, descending, sigmoid colon polyps       Recommendations:   Colonoscopy in 5 years pending final pathology.  The office will call within the next  3-10 days with a final recommendation.  Keep a copy of the photographs of the procedure given to you today for possible need for reference in the future.      Surgeon: Tobias Barrett MD    Anesthetic: MAC per Kala Mcconnell MD    Scope Withdrawal Time: 21 minutes 55 seconds    Procedure Details:    MAC anesthesia was induced.  A digital rectal exam was performed along with visual inspection of the anus.  The colonoscope was inserted to the cecum with relative ease.  Cecum was identified by the appendiceal orifice and the ileocecal valve and photographed for documentation.       A careful inspection was made as the scope was withdrawn, 3 polyps were noted in the transverse, descending, and sigmoid colon.  These were removed in their entirety with the hot snare. Prep quality was good.      Tobias Barrett M.D.  General and Endoscopic Surgery  [facility]    [address], [address]  [place], [place], [address]  P: [phone]  F: [phone]

## 2022-06-23 NOTE — ANESTHESIA PREPROCEDURE EVALUATION
Anesthesia Evaluation     Patient summary reviewed and Nursing notes reviewed   no history of anesthetic complications:               Airway   Mallampati: II  TM distance: >3 FB  Neck ROM: full  no difficulty expected  Dental - normal exam     Pulmonary     breath sounds clear to auscultation  (+) sleep apnea (cant use CPAP due to claustrophobia),   (-) shortness of breath, decreased breath sounds, wheezes  Cardiovascular - normal exam  Exercise tolerance: good (4-7 METS)    Rhythm: regular  Rate: normal    (+) hypertension well controlled 2 medications or greater, hyperlipidemia,   (-) past MI, angina, CHF, orthopnea, PND, NOONAN, PVD      Neuro/Psych- negative ROS  (-) seizures, neuromuscular disease, TIA, CVA, dizziness/light headedness, weakness, numbness  GI/Hepatic/Renal/Endo    (+) morbid obesity, GERD,  diabetes mellitus type 2,   (-) liver disease    ROS Comment: Hx pacreatitis      Musculoskeletal     (+) arthralgias,   Abdominal  - normal exam   Substance History - negative use  (-) alcohol use, drug use     OB/GYN negative ob/gyn ROS         Other   arthritis,                        Anesthesia Plan    ASA 3     MAC     (A-line)  intravenous induction     Anesthetic plan, risks, benefits, and alternatives have been provided, discussed and informed consent has been obtained with: patient.    Plan discussed with CRNA.

## 2022-06-27 LAB
LAB AP CASE REPORT: NORMAL
PATH REPORT.FINAL DX SPEC: NORMAL
PATH REPORT.GROSS SPEC: NORMAL

## (undated) DEVICE — SUT SILK 3/0 TIES 18IN A184H

## (undated) DEVICE — ADAPT CLN BIOGUARD AIR/H2O DISP

## (undated) DEVICE — LN SMPL CO2 SHTRM SD STREAM W/M LUER

## (undated) DEVICE — TUBING, SUCTION, 1/4" X 20', STRAIGHT: Brand: MEDLINE INDUSTRIES, INC.

## (undated) DEVICE — SUT VIC 0 TN 27IN DYED JTN0G

## (undated) DEVICE — LOU CYSTO: Brand: MEDLINE INDUSTRIES, INC.

## (undated) DEVICE — NITINOL WIRE WITH HYDROPHILIC TIP: Brand: SENSOR

## (undated) DEVICE — CANN O2 ETCO2 FITS ALL CONN CO2 SMPL A/ 7IN DISP LF

## (undated) DEVICE — TIDISHIELD UROLOGY DRAIN BAGS FROSTY VINYL STERILE FITS SIEMENS UROSKOP ACCESS 20 PER CASE: Brand: TIDISHIELD

## (undated) DEVICE — ENDOPATH XCEL BLADELESS TROCARS WITH STABILITY SLEEVES: Brand: ENDOPATH XCEL

## (undated) DEVICE — INTENDED FOR TISSUE SEPARATION, AND OTHER PROCEDURES THAT REQUIRE A SHARP SURGICAL BLADE TO PUNCTURE OR CUT.: Brand: BARD-PARKER ® CARBON RIB-BACK BLADES

## (undated) DEVICE — SUT MNCRYL 4/0 PS2 18 IN

## (undated) DEVICE — APL DUPLOSPRAYER MIS W/SNP LK 30CM

## (undated) DEVICE — SPK PIN NSR FLUID NONVNT 2WY MINI LL LF

## (undated) DEVICE — SPECIMEN RETRIEVAL POUCH: Brand: ENDO CATCH II

## (undated) DEVICE — KT ORCA ORCAPOD DISP STRL

## (undated) DEVICE — PENCL E/S HNDSWCH ROCKR CB

## (undated) DEVICE — GLV SURG BIOGEL LTX PF 8

## (undated) DEVICE — APPL HEMO SURG ARISTA/AH/FLEXITIP XL 38CM

## (undated) DEVICE — BNDG ADHS CURAD FLX/FABRC 2X4IN STRL LF

## (undated) DEVICE — ENDOPATH XCEL UNIVERSAL TROCAR STABLILITY SLEEVES: Brand: ENDOPATH XCEL

## (undated) DEVICE — TUBING, SUCTION, 1/4" X 10', STRAIGHT: Brand: MEDLINE

## (undated) DEVICE — TOTAL TRAY, 16FR 10ML SIL FOLEY, URN: Brand: MEDLINE

## (undated) DEVICE — 3M™ STERI-STRIP™ REINFORCED ADHESIVE SKIN CLOSURES, R1547, 1/2 IN X 4 IN (12 MM X 100 MM), 6 STRIPS/ENVELOPE: Brand: 3M™ STERI-STRIP™

## (undated) DEVICE — SINGLE-USE BIOPSY FORCEPS: Brand: RADIAL JAW 4

## (undated) DEVICE — SYR LUERLOK 20CC

## (undated) DEVICE — SUT SILK 2/0 TIES 18IN A185H

## (undated) DEVICE — COVER,MAYO STAND,STERILE: Brand: MEDLINE

## (undated) DEVICE — THE SINGLE USE ETRAP – POLYP TRAP IS USED FOR SUCTION RETRIEVAL OF ENDOSCOPICALLY REMOVED POLYPS.: Brand: ETRAP

## (undated) DEVICE — APPL CHLORAPREP W/TINT 26ML ORNG

## (undated) DEVICE — DISPOSABLE GRASPER CARTRIDGE: Brand: DIRECT DRIVE REPOSABLE GRASPERS

## (undated) DEVICE — SOL NACL 0.9PCT 1000ML

## (undated) DEVICE — GOWN,NON-REINFORCED,SIRUS,SET IN SLV,XL: Brand: MEDLINE

## (undated) DEVICE — GOWN,SIRUS,NON REINFRCD,LARGE,SET IN SL: Brand: MEDLINE

## (undated) DEVICE — MEDI-VAC YANKAUER SUCTION HANDLE W/BULBOUS TIP: Brand: CARDINAL HEALTH

## (undated) DEVICE — SENSR O2 OXIMAX FNGR A/ 18IN NONSTR

## (undated) DEVICE — SNAR POLYP SENSATION STDOVL 27 240 BX40

## (undated) DEVICE — ST TISSOMAT SPRY

## (undated) DEVICE — LAPAROSCOPIC GAS CONDITIONING DEVICE.: Brand: INSUFLOW

## (undated) DEVICE — STPLR SKIN VISISTAT WD 35CT

## (undated) DEVICE — VISUALIZATION SYSTEM: Brand: CLEARIFY

## (undated) DEVICE — ENDOCUT SCISSOR TIP, DISPOSABLE: Brand: RENEW

## (undated) DEVICE — LOU LAP CHOLE: Brand: MEDLINE INDUSTRIES, INC.

## (undated) DEVICE — JACKSON-PRATT 100CC BULB RESERVOIR: Brand: CARDINAL HEALTH

## (undated) DEVICE — SUT MNCRYL PLS ANTIB UD 4/0 PS2 18IN

## (undated) DEVICE — SUT VIC 5/0 PS2 18IN J495H

## (undated) DEVICE — ECHELON FLEX 60 ARTICULATING ENDOSCOPIC LINEAR CUTTER (NO CARTRIDGE): Brand: ECHELON FLEX ENDOPATH

## (undated) DEVICE — LAPAROSCOPIC SMOKE ELIMINATION DEVICE: Brand: PNEUVIEW XE

## (undated) DEVICE — HARMONIC ACE +7 LAPAROSCOPIC SHEARS ADVANCED HEMOSTASIS 5MM DIAMETER 36CM SHAFT LENGTH  FOR USE WITH GRAY HAND PIECE ONLY: Brand: HARMONIC ACE

## (undated) DEVICE — Device

## (undated) DEVICE — ELECTRD BLD EDGE/INSUL1P 2.4X5.1MM STRL

## (undated) DEVICE — SPNG LAP 18X18IN LF STRL PK/5

## (undated) DEVICE — ENDOPATH PNEUMONEEDLE INSUFFLATION NEEDLES WITH LUER LOCK CONNECTORS 120MM: Brand: ENDOPATH